# Patient Record
Sex: FEMALE | Race: BLACK OR AFRICAN AMERICAN | NOT HISPANIC OR LATINO | Employment: OTHER | URBAN - METROPOLITAN AREA
[De-identification: names, ages, dates, MRNs, and addresses within clinical notes are randomized per-mention and may not be internally consistent; named-entity substitution may affect disease eponyms.]

---

## 2017-04-27 ENCOUNTER — CONVERSION ENCOUNTER (OUTPATIENT)
Dept: GENERAL RADIOLOGY | Facility: HOSPITAL | Age: 57
End: 2017-04-27

## 2018-02-02 ENCOUNTER — CONVERSION ENCOUNTER (OUTPATIENT)
Dept: FAMILY MEDICINE CLINIC | Facility: CLINIC | Age: 58
End: 2018-02-02

## 2018-02-02 ENCOUNTER — OFFICE VISIT CONVERTED (OUTPATIENT)
Dept: FAMILY MEDICINE CLINIC | Facility: CLINIC | Age: 58
End: 2018-02-02
Attending: FAMILY MEDICINE

## 2018-02-27 ENCOUNTER — OFFICE VISIT CONVERTED (OUTPATIENT)
Dept: CARDIOLOGY | Facility: CLINIC | Age: 58
End: 2018-02-27
Attending: SPECIALIST

## 2018-03-06 ENCOUNTER — CONVERSION ENCOUNTER (OUTPATIENT)
Dept: FAMILY MEDICINE CLINIC | Facility: CLINIC | Age: 58
End: 2018-03-06

## 2018-03-06 ENCOUNTER — OFFICE VISIT CONVERTED (OUTPATIENT)
Dept: FAMILY MEDICINE CLINIC | Facility: CLINIC | Age: 58
End: 2018-03-06
Attending: FAMILY MEDICINE

## 2018-04-24 ENCOUNTER — CONVERSION ENCOUNTER (OUTPATIENT)
Dept: FAMILY MEDICINE CLINIC | Facility: CLINIC | Age: 58
End: 2018-04-24

## 2018-04-24 ENCOUNTER — OFFICE VISIT CONVERTED (OUTPATIENT)
Dept: FAMILY MEDICINE CLINIC | Facility: CLINIC | Age: 58
End: 2018-04-24
Attending: FAMILY MEDICINE

## 2018-05-01 ENCOUNTER — OFFICE VISIT CONVERTED (OUTPATIENT)
Dept: ORTHOPEDIC SURGERY | Facility: CLINIC | Age: 58
End: 2018-05-01
Attending: PHYSICIAN ASSISTANT

## 2018-05-01 ENCOUNTER — PROCEDURE VISIT CONVERTED (OUTPATIENT)
Dept: PODIATRY | Facility: CLINIC | Age: 58
End: 2018-05-01
Attending: PODIATRIST

## 2018-05-15 ENCOUNTER — CONVERSION ENCOUNTER (OUTPATIENT)
Dept: CARDIOLOGY | Facility: CLINIC | Age: 58
End: 2018-05-15
Attending: SPECIALIST

## 2018-07-26 ENCOUNTER — OFFICE VISIT CONVERTED (OUTPATIENT)
Dept: FAMILY MEDICINE CLINIC | Facility: CLINIC | Age: 58
End: 2018-07-26
Attending: FAMILY MEDICINE

## 2018-08-08 ENCOUNTER — PROCEDURE VISIT CONVERTED (OUTPATIENT)
Dept: PODIATRY | Facility: CLINIC | Age: 58
End: 2018-08-08
Attending: PODIATRIST

## 2018-08-09 ENCOUNTER — OFFICE VISIT CONVERTED (OUTPATIENT)
Dept: ORTHOPEDIC SURGERY | Facility: CLINIC | Age: 58
End: 2018-08-09
Attending: PHYSICIAN ASSISTANT

## 2018-10-01 ENCOUNTER — OFFICE VISIT CONVERTED (OUTPATIENT)
Dept: FAMILY MEDICINE CLINIC | Facility: CLINIC | Age: 58
End: 2018-10-01
Attending: NURSE PRACTITIONER

## 2018-10-02 ENCOUNTER — CONVERSION ENCOUNTER (OUTPATIENT)
Dept: GENERAL RADIOLOGY | Facility: HOSPITAL | Age: 58
End: 2018-10-02

## 2018-10-11 ENCOUNTER — OFFICE VISIT CONVERTED (OUTPATIENT)
Dept: PODIATRY | Facility: CLINIC | Age: 58
End: 2018-10-11
Attending: PODIATRIST

## 2018-10-11 ENCOUNTER — CONVERSION ENCOUNTER (OUTPATIENT)
Dept: PODIATRY | Facility: CLINIC | Age: 58
End: 2018-10-11

## 2018-10-18 ENCOUNTER — CONVERSION ENCOUNTER (OUTPATIENT)
Dept: MAMMOGRAPHY | Facility: HOSPITAL | Age: 58
End: 2018-10-18

## 2018-10-23 ENCOUNTER — CONVERSION ENCOUNTER (OUTPATIENT)
Dept: MAMMOGRAPHY | Facility: HOSPITAL | Age: 58
End: 2018-10-23

## 2018-10-31 ENCOUNTER — PROCEDURE VISIT CONVERTED (OUTPATIENT)
Dept: PODIATRY | Facility: CLINIC | Age: 58
End: 2018-10-31
Attending: PODIATRIST

## 2018-12-13 ENCOUNTER — OFFICE VISIT CONVERTED (OUTPATIENT)
Dept: ORTHOPEDIC SURGERY | Facility: CLINIC | Age: 58
End: 2018-12-13
Attending: PHYSICIAN ASSISTANT

## 2018-12-21 ENCOUNTER — OFFICE VISIT CONVERTED (OUTPATIENT)
Dept: ORTHOPEDIC SURGERY | Facility: CLINIC | Age: 58
End: 2018-12-21
Attending: PHYSICIAN ASSISTANT

## 2018-12-21 ENCOUNTER — CONVERSION ENCOUNTER (OUTPATIENT)
Dept: ORTHOPEDIC SURGERY | Facility: CLINIC | Age: 58
End: 2018-12-21

## 2019-01-08 ENCOUNTER — OFFICE VISIT CONVERTED (OUTPATIENT)
Dept: ORTHOPEDIC SURGERY | Facility: CLINIC | Age: 59
End: 2019-01-08
Attending: PHYSICIAN ASSISTANT

## 2019-02-20 ENCOUNTER — PROCEDURE VISIT CONVERTED (OUTPATIENT)
Dept: PODIATRY | Facility: CLINIC | Age: 59
End: 2019-02-20
Attending: PODIATRIST

## 2019-05-08 ENCOUNTER — HOSPITAL ENCOUNTER (OUTPATIENT)
Dept: SLEEP MEDICINE | Facility: HOSPITAL | Age: 59
Discharge: HOME | End: 2019-05-08
Attending: PSYCHIATRY & NEUROLOGY

## 2019-08-14 ENCOUNTER — HOSPITAL ENCOUNTER (OUTPATIENT)
Dept: SLEEP MEDICINE | Facility: HOSPITAL | Age: 59
Discharge: HOME | End: 2019-08-14
Attending: PSYCHIATRY & NEUROLOGY

## 2019-11-01 ENCOUNTER — CONVERSION ENCOUNTER (OUTPATIENT)
Dept: FAMILY MEDICINE CLINIC | Facility: CLINIC | Age: 59
End: 2019-11-01

## 2019-11-01 ENCOUNTER — HOSPITAL ENCOUNTER (OUTPATIENT)
Dept: FAMILY MEDICINE CLINIC | Facility: CLINIC | Age: 59
Discharge: HOME | End: 2019-11-01
Attending: FAMILY MEDICINE

## 2019-11-01 ENCOUNTER — OFFICE VISIT CONVERTED (OUTPATIENT)
Dept: FAMILY MEDICINE CLINIC | Facility: CLINIC | Age: 59
End: 2019-11-01
Attending: FAMILY MEDICINE

## 2019-11-01 LAB
AMPHETAMINES UR QL SCN: NEGATIVE
BARBITURATES UR QL SCN: NEGATIVE
BENZODIAZ UR QL SCN: NEGATIVE
CONV COCAINE, UR: NEGATIVE
METHADONE UR QL SCN: NEGATIVE
OPIATES TESTED UR SCN: NEGATIVE
OXYCODONE UR QL SCN: NEGATIVE
PCP UR QL: NEGATIVE
THC SERPLBLD CFM-MCNC: NEGATIVE NG/ML

## 2019-11-07 ENCOUNTER — HOSPITAL ENCOUNTER (OUTPATIENT)
Dept: LAB | Facility: HOSPITAL | Age: 59
Discharge: HOME | End: 2019-11-07
Attending: FAMILY MEDICINE

## 2019-11-07 LAB
25(OH)D3 SERPL-MCNC: 20.2 NG/ML (ref 30–100)
ALBUMIN SERPL-MCNC: 3.8 G/DL (ref 3.5–5)
ALBUMIN/GLOB SERPL: 0.9 {RATIO} (ref 1.4–2.6)
ALP SERPL-CCNC: 76 U/L (ref 53–141)
ALT SERPL-CCNC: 15 U/L (ref 10–40)
ANION GAP SERPL CALC-SCNC: 16 MMOL/L (ref 8–19)
APPEARANCE UR: CLEAR
AST SERPL-CCNC: 12 U/L (ref 15–50)
BASOPHILS # BLD AUTO: 0.05 10*3/UL (ref 0–0.2)
BASOPHILS NFR BLD AUTO: 0.7 % (ref 0–3)
BILIRUB SERPL-MCNC: 0.28 MG/DL (ref 0.2–1.3)
BILIRUB UR QL: NEGATIVE
BUN SERPL-MCNC: 16 MG/DL (ref 5–25)
BUN/CREAT SERPL: 18 {RATIO} (ref 6–20)
CALCIUM SERPL-MCNC: 9.5 MG/DL (ref 8.7–10.4)
CHLORIDE SERPL-SCNC: 104 MMOL/L (ref 99–111)
CHOLEST SERPL-MCNC: 170 MG/DL (ref 107–200)
CHOLEST/HDLC SERPL: 2.4 {RATIO} (ref 3–6)
COLOR UR: YELLOW
CONV ABS IMM GRAN: 0.01 10*3/UL (ref 0–0.2)
CONV CO2: 26 MMOL/L (ref 22–32)
CONV COLLECTION SOURCE (UA): NORMAL
CONV CREATININE URINE, RANDOM: 165 MG/DL (ref 10–300)
CONV IMMATURE GRAN: 0.1 % (ref 0–1.8)
CONV MICROALBUM.,U,RANDOM: <12 MG/L (ref 0–20)
CONV TOTAL PROTEIN: 7.9 G/DL (ref 6.3–8.2)
CONV UROBILINOGEN IN URINE BY AUTOMATED TEST STRIP: 0.2 {EHRLICHU}/DL (ref 0.1–1)
CREAT UR-MCNC: 0.89 MG/DL (ref 0.5–0.9)
DEPRECATED RDW RBC AUTO: 44.2 FL (ref 36.4–46.3)
EOSINOPHIL # BLD AUTO: 0.13 10*3/UL (ref 0–0.7)
EOSINOPHIL # BLD AUTO: 1.8 % (ref 0–7)
ERYTHROCYTE [DISTWIDTH] IN BLOOD BY AUTOMATED COUNT: 13.2 % (ref 11.7–14.4)
EST. AVERAGE GLUCOSE BLD GHB EST-MCNC: 128 MG/DL
FOLATE SERPL-MCNC: 6.7 NG/ML (ref 4.8–20)
GFR SERPLBLD BASED ON 1.73 SQ M-ARVRAT: >60 ML/MIN/{1.73_M2}
GLOBULIN UR ELPH-MCNC: 4.1 G/DL (ref 2–3.5)
GLUCOSE SERPL-MCNC: 91 MG/DL (ref 65–99)
GLUCOSE UR QL: NEGATIVE MG/DL
HBA1C MFR BLD: 6.1 % (ref 3.5–5.7)
HCT VFR BLD AUTO: 38.9 % (ref 37–47)
HDLC SERPL-MCNC: 71 MG/DL (ref 40–60)
HGB BLD-MCNC: 12.5 G/DL (ref 12–16)
HGB UR QL STRIP: NEGATIVE
KETONES UR QL STRIP: NEGATIVE MG/DL
LDLC SERPL CALC-MCNC: 76 MG/DL (ref 70–100)
LEUKOCYTE ESTERASE UR QL STRIP: NEGATIVE
LYMPHOCYTES # BLD AUTO: 2.51 10*3/UL (ref 1–5)
LYMPHOCYTES NFR BLD AUTO: 35.7 % (ref 20–45)
MCH RBC QN AUTO: 29.3 PG (ref 27–31)
MCHC RBC AUTO-ENTMCNC: 32.1 G/DL (ref 33–37)
MCV RBC AUTO: 91.1 FL (ref 81–99)
MICROALBUMIN/CREAT UR: 7.3 MG/G{CRE} (ref 0–35)
MONOCYTES # BLD AUTO: 0.58 10*3/UL (ref 0.2–1.2)
MONOCYTES NFR BLD AUTO: 8.3 % (ref 3–10)
NEUTROPHILS # BLD AUTO: 3.75 10*3/UL (ref 2–8)
NEUTROPHILS NFR BLD AUTO: 53.4 % (ref 30–85)
NITRITE UR QL STRIP: NEGATIVE
NRBC CBCN: 0 % (ref 0–0.7)
OSMOLALITY SERPL CALC.SUM OF ELEC: 295 MOSM/KG (ref 273–304)
PH UR STRIP.AUTO: 5 [PH] (ref 5–8)
PLATELET # BLD AUTO: 248 10*3/UL (ref 130–400)
PMV BLD AUTO: 11.1 FL (ref 9.4–12.3)
POTASSIUM SERPL-SCNC: 4.1 MMOL/L (ref 3.5–5.3)
PROT UR QL: NEGATIVE MG/DL
RBC # BLD AUTO: 4.27 10*6/UL (ref 4.2–5.4)
SODIUM SERPL-SCNC: 142 MMOL/L (ref 135–147)
SP GR UR: 1.02 (ref 1–1.03)
TRIGL SERPL-MCNC: 113 MG/DL (ref 40–150)
TSH SERPL-ACNC: 1.87 M[IU]/L (ref 0.27–4.2)
VIT B12 SERPL-MCNC: 423 PG/ML (ref 211–911)
VLDLC SERPL-MCNC: 23 MG/DL (ref 5–37)
WBC # BLD AUTO: 7.03 10*3/UL (ref 4.8–10.8)

## 2019-11-11 ENCOUNTER — HOSPITAL ENCOUNTER (OUTPATIENT)
Dept: MAMMOGRAPHY | Facility: HOSPITAL | Age: 59
Discharge: HOME | End: 2019-11-11
Attending: FAMILY MEDICINE

## 2019-11-20 ENCOUNTER — HOSPITAL ENCOUNTER (OUTPATIENT)
Dept: MRI IMAGING | Facility: HOSPITAL | Age: 59
Discharge: HOME | End: 2019-11-20
Attending: FAMILY MEDICINE

## 2019-12-04 ENCOUNTER — OFFICE VISIT CONVERTED (OUTPATIENT)
Dept: SURGERY | Facility: CLINIC | Age: 59
End: 2019-12-04
Attending: SURGERY

## 2019-12-13 ENCOUNTER — OFFICE VISIT CONVERTED (OUTPATIENT)
Dept: FAMILY MEDICINE CLINIC | Facility: CLINIC | Age: 59
End: 2019-12-13
Attending: FAMILY MEDICINE

## 2019-12-13 ENCOUNTER — CONVERSION ENCOUNTER (OUTPATIENT)
Dept: FAMILY MEDICINE CLINIC | Facility: CLINIC | Age: 59
End: 2019-12-13

## 2019-12-17 ENCOUNTER — CONVERSION ENCOUNTER (OUTPATIENT)
Dept: ORTHOPEDIC SURGERY | Facility: CLINIC | Age: 59
End: 2019-12-17

## 2019-12-17 ENCOUNTER — OFFICE VISIT CONVERTED (OUTPATIENT)
Dept: ORTHOPEDIC SURGERY | Facility: CLINIC | Age: 59
End: 2019-12-17
Attending: PHYSICIAN ASSISTANT

## 2020-01-10 ENCOUNTER — HOSPITAL ENCOUNTER (OUTPATIENT)
Dept: GASTROENTEROLOGY | Facility: HOSPITAL | Age: 60
Setting detail: HOSPITAL OUTPATIENT SURGERY
Discharge: HOME | End: 2020-01-10
Attending: SURGERY

## 2020-01-16 ENCOUNTER — OFFICE VISIT CONVERTED (OUTPATIENT)
Dept: ORTHOPEDIC SURGERY | Facility: CLINIC | Age: 60
End: 2020-01-16
Attending: ORTHOPAEDIC SURGERY

## 2020-01-22 ENCOUNTER — HOSPITAL ENCOUNTER (OUTPATIENT)
Dept: PERIOP | Facility: HOSPITAL | Age: 60
Setting detail: HOSPITAL OUTPATIENT SURGERY
Discharge: HOME | End: 2020-01-22
Attending: ORTHOPAEDIC SURGERY

## 2020-01-27 ENCOUNTER — HOSPITAL ENCOUNTER (OUTPATIENT)
Dept: PHYSICAL THERAPY | Facility: CLINIC | Age: 60
Setting detail: RECURRING SERIES
Discharge: HOME | End: 2020-04-30
Attending: ORTHOPAEDIC SURGERY

## 2020-02-05 ENCOUNTER — CONVERSION ENCOUNTER (OUTPATIENT)
Dept: SURGERY | Facility: CLINIC | Age: 60
End: 2020-02-05

## 2020-02-05 ENCOUNTER — OFFICE VISIT CONVERTED (OUTPATIENT)
Dept: SURGERY | Facility: CLINIC | Age: 60
End: 2020-02-05
Attending: NURSE PRACTITIONER

## 2020-02-06 ENCOUNTER — OFFICE VISIT CONVERTED (OUTPATIENT)
Dept: ORTHOPEDIC SURGERY | Facility: CLINIC | Age: 60
End: 2020-02-06
Attending: PHYSICIAN ASSISTANT

## 2020-02-17 ENCOUNTER — OFFICE VISIT CONVERTED (OUTPATIENT)
Dept: PODIATRY | Facility: CLINIC | Age: 60
End: 2020-02-17
Attending: PODIATRIST

## 2020-02-17 ENCOUNTER — CONVERSION ENCOUNTER (OUTPATIENT)
Dept: PODIATRY | Facility: CLINIC | Age: 60
End: 2020-02-17

## 2020-03-05 ENCOUNTER — CONVERSION ENCOUNTER (OUTPATIENT)
Dept: ORTHOPEDIC SURGERY | Facility: CLINIC | Age: 60
End: 2020-03-05

## 2020-03-05 ENCOUNTER — OFFICE VISIT CONVERTED (OUTPATIENT)
Dept: ORTHOPEDIC SURGERY | Facility: CLINIC | Age: 60
End: 2020-03-05
Attending: PHYSICIAN ASSISTANT

## 2020-03-11 ENCOUNTER — HOSPITAL ENCOUNTER (OUTPATIENT)
Dept: LAB | Facility: HOSPITAL | Age: 60
Discharge: HOME | End: 2020-03-11
Attending: FAMILY MEDICINE

## 2020-03-11 LAB
25(OH)D3 SERPL-MCNC: 18.5 NG/ML (ref 30–100)
ALBUMIN SERPL-MCNC: 3.9 G/DL (ref 3.5–5)
ALBUMIN/GLOB SERPL: 1 {RATIO} (ref 1.4–2.6)
ALP SERPL-CCNC: 57 U/L (ref 43–160)
ALT SERPL-CCNC: 12 U/L (ref 10–40)
ANION GAP SERPL CALC-SCNC: 19 MMOL/L (ref 8–19)
APPEARANCE UR: ABNORMAL
AST SERPL-CCNC: 12 U/L (ref 15–50)
BASOPHILS # BLD AUTO: 0.04 10*3/UL (ref 0–0.2)
BASOPHILS NFR BLD AUTO: 0.6 % (ref 0–3)
BILIRUB SERPL-MCNC: 0.19 MG/DL (ref 0.2–1.3)
BILIRUB UR QL: NEGATIVE
BUN SERPL-MCNC: 11 MG/DL (ref 5–25)
BUN/CREAT SERPL: 12 {RATIO} (ref 6–20)
CALCIUM SERPL-MCNC: 9.4 MG/DL (ref 8.7–10.4)
CHLORIDE SERPL-SCNC: 102 MMOL/L (ref 99–111)
CHOLEST SERPL-MCNC: 162 MG/DL (ref 107–200)
CHOLEST/HDLC SERPL: 2.1 {RATIO} (ref 3–6)
COLOR UR: YELLOW
CONV ABS IMM GRAN: 0.02 10*3/UL (ref 0–0.2)
CONV BACTERIA: ABNORMAL
CONV CO2: 25 MMOL/L (ref 22–32)
CONV COLLECTION SOURCE (UA): ABNORMAL
CONV CREATININE URINE, RANDOM: 306.4 MG/DL (ref 10–300)
CONV IMMATURE GRAN: 0.3 % (ref 0–1.8)
CONV MICROALBUM.,U,RANDOM: 26.3 MG/L (ref 0–20)
CONV TOTAL PROTEIN: 7.7 G/DL (ref 6.3–8.2)
CONV UROBILINOGEN IN URINE BY AUTOMATED TEST STRIP: 1 {EHRLICHU}/DL (ref 0.1–1)
CREAT UR-MCNC: 0.89 MG/DL (ref 0.5–0.9)
DEPRECATED RDW RBC AUTO: 45.9 FL (ref 36.4–46.3)
EOSINOPHIL # BLD AUTO: 0.11 10*3/UL (ref 0–0.7)
EOSINOPHIL # BLD AUTO: 1.6 % (ref 0–7)
ERYTHROCYTE [DISTWIDTH] IN BLOOD BY AUTOMATED COUNT: 13.4 % (ref 11.7–14.4)
EST. AVERAGE GLUCOSE BLD GHB EST-MCNC: 114 MG/DL
FOLATE SERPL-MCNC: 9.2 NG/ML (ref 4.8–20)
GFR SERPLBLD BASED ON 1.73 SQ M-ARVRAT: >60 ML/MIN/{1.73_M2}
GLOBULIN UR ELPH-MCNC: 3.8 G/DL (ref 2–3.5)
GLUCOSE SERPL-MCNC: 83 MG/DL (ref 65–99)
GLUCOSE UR QL: NEGATIVE MG/DL
HBA1C MFR BLD: 5.6 % (ref 3.5–5.7)
HCT VFR BLD AUTO: 40.2 % (ref 37–47)
HDLC SERPL-MCNC: 79 MG/DL (ref 40–60)
HGB BLD-MCNC: 12.4 G/DL (ref 12–16)
HGB UR QL STRIP: ABNORMAL
KETONES UR QL STRIP: NEGATIVE MG/DL
LDLC SERPL CALC-MCNC: 61 MG/DL (ref 70–100)
LEUKOCYTE ESTERASE UR QL STRIP: ABNORMAL
LYMPHOCYTES # BLD AUTO: 3.07 10*3/UL (ref 1–5)
LYMPHOCYTES NFR BLD AUTO: 44.9 % (ref 20–45)
MCH RBC QN AUTO: 28.6 PG (ref 27–31)
MCHC RBC AUTO-ENTMCNC: 30.8 G/DL (ref 33–37)
MCV RBC AUTO: 92.8 FL (ref 81–99)
MICROALBUMIN/CREAT UR: 8.6 MG/G{CRE} (ref 0–35)
MONOCYTES # BLD AUTO: 0.6 10*3/UL (ref 0.2–1.2)
MONOCYTES NFR BLD AUTO: 8.8 % (ref 3–10)
NEUTROPHILS # BLD AUTO: 2.99 10*3/UL (ref 2–8)
NEUTROPHILS NFR BLD AUTO: 43.8 % (ref 30–85)
NITRITE UR QL STRIP: NEGATIVE
NRBC CBCN: 0 % (ref 0–0.7)
OSMOLALITY SERPL CALC.SUM OF ELEC: 293 MOSM/KG (ref 273–304)
PH UR STRIP.AUTO: 5 [PH] (ref 5–8)
PLATELET # BLD AUTO: 278 10*3/UL (ref 130–400)
PMV BLD AUTO: 11 FL (ref 9.4–12.3)
POTASSIUM SERPL-SCNC: 4.1 MMOL/L (ref 3.5–5.3)
PROT UR QL: ABNORMAL MG/DL
RBC # BLD AUTO: 4.33 10*6/UL (ref 4.2–5.4)
RBC #/AREA URNS HPF: ABNORMAL /[HPF]
SODIUM SERPL-SCNC: 142 MMOL/L (ref 135–147)
SP GR UR: 1.03 (ref 1–1.03)
SQUAMOUS SPT QL MICRO: ABNORMAL /[HPF]
TRIGL SERPL-MCNC: 110 MG/DL (ref 40–150)
TSH SERPL-ACNC: 2.07 M[IU]/L (ref 0.27–4.2)
VIT B12 SERPL-MCNC: 405 PG/ML (ref 211–911)
VLDLC SERPL-MCNC: 22 MG/DL (ref 5–37)
WBC # BLD AUTO: 6.83 10*3/UL (ref 4.8–10.8)
WBC #/AREA URNS HPF: ABNORMAL /[HPF]

## 2020-03-13 ENCOUNTER — CONVERSION ENCOUNTER (OUTPATIENT)
Dept: FAMILY MEDICINE CLINIC | Facility: CLINIC | Age: 60
End: 2020-03-13

## 2020-03-13 ENCOUNTER — HOSPITAL ENCOUNTER (OUTPATIENT)
Dept: FAMILY MEDICINE CLINIC | Facility: CLINIC | Age: 60
Discharge: HOME | End: 2020-03-13
Attending: FAMILY MEDICINE

## 2020-03-13 ENCOUNTER — OFFICE VISIT CONVERTED (OUTPATIENT)
Dept: FAMILY MEDICINE CLINIC | Facility: CLINIC | Age: 60
End: 2020-03-13
Attending: FAMILY MEDICINE

## 2020-03-15 LAB — BACTERIA UR CULT: NORMAL

## 2020-04-16 ENCOUNTER — CONVERSION ENCOUNTER (OUTPATIENT)
Dept: ORTHOPEDIC SURGERY | Facility: CLINIC | Age: 60
End: 2020-04-16

## 2020-04-16 ENCOUNTER — TELEMEDICINE CONVERTED (OUTPATIENT)
Dept: ORTHOPEDIC SURGERY | Facility: CLINIC | Age: 60
End: 2020-04-16
Attending: PHYSICIAN ASSISTANT

## 2020-05-28 ENCOUNTER — OFFICE VISIT CONVERTED (OUTPATIENT)
Dept: ORTHOPEDIC SURGERY | Facility: CLINIC | Age: 60
End: 2020-05-28
Attending: PHYSICIAN ASSISTANT

## 2020-06-25 ENCOUNTER — CONVERSION ENCOUNTER (OUTPATIENT)
Dept: PODIATRY | Facility: CLINIC | Age: 60
End: 2020-06-25

## 2020-06-25 ENCOUNTER — PROCEDURE VISIT CONVERTED (OUTPATIENT)
Dept: PODIATRY | Facility: CLINIC | Age: 60
End: 2020-06-25
Attending: PODIATRIST

## 2020-07-20 ENCOUNTER — OFFICE VISIT CONVERTED (OUTPATIENT)
Dept: FAMILY MEDICINE CLINIC | Facility: CLINIC | Age: 60
End: 2020-07-20
Attending: FAMILY MEDICINE

## 2020-07-29 ENCOUNTER — HOSPITAL ENCOUNTER (OUTPATIENT)
Dept: SLEEP MEDICINE | Facility: HOSPITAL | Age: 60
Discharge: HOME | End: 2020-07-29
Attending: PSYCHIATRY & NEUROLOGY

## 2020-07-30 ENCOUNTER — OFFICE VISIT CONVERTED (OUTPATIENT)
Dept: ORTHOPEDIC SURGERY | Facility: CLINIC | Age: 60
End: 2020-07-30
Attending: PHYSICIAN ASSISTANT

## 2020-08-12 ENCOUNTER — HOSPITAL ENCOUNTER (OUTPATIENT)
Dept: MAMMOGRAPHY | Facility: HOSPITAL | Age: 60
Discharge: HOME | End: 2020-08-12
Attending: FAMILY MEDICINE

## 2020-09-01 ENCOUNTER — OFFICE VISIT CONVERTED (OUTPATIENT)
Dept: ORTHOPEDIC SURGERY | Facility: CLINIC | Age: 60
End: 2020-09-01
Attending: PHYSICIAN ASSISTANT

## 2020-09-28 ENCOUNTER — PROCEDURE VISIT CONVERTED (OUTPATIENT)
Dept: PODIATRY | Facility: CLINIC | Age: 60
End: 2020-09-28
Attending: PODIATRIST

## 2020-11-13 ENCOUNTER — HOSPITAL ENCOUNTER (OUTPATIENT)
Dept: URGENT CARE | Facility: CLINIC | Age: 60
Discharge: HOME | End: 2020-11-13
Attending: NURSE PRACTITIONER

## 2020-12-14 ENCOUNTER — PROCEDURE VISIT CONVERTED (OUTPATIENT)
Dept: PODIATRY | Facility: CLINIC | Age: 60
End: 2020-12-14
Attending: PODIATRIST

## 2021-02-22 ENCOUNTER — PROCEDURE VISIT CONVERTED (OUTPATIENT)
Dept: PODIATRY | Facility: CLINIC | Age: 61
End: 2021-02-22
Attending: PODIATRIST

## 2021-02-25 ENCOUNTER — OFFICE VISIT CONVERTED (OUTPATIENT)
Dept: FAMILY MEDICINE CLINIC | Facility: CLINIC | Age: 61
End: 2021-02-25
Attending: FAMILY MEDICINE

## 2021-03-02 ENCOUNTER — CONVERSION ENCOUNTER (OUTPATIENT)
Dept: ORTHOPEDIC SURGERY | Facility: CLINIC | Age: 61
End: 2021-03-02

## 2021-03-02 ENCOUNTER — OFFICE VISIT CONVERTED (OUTPATIENT)
Dept: ORTHOPEDIC SURGERY | Facility: CLINIC | Age: 61
End: 2021-03-02
Attending: ORTHOPAEDIC SURGERY

## 2021-03-10 ENCOUNTER — HOSPITAL ENCOUNTER (OUTPATIENT)
Dept: LAB | Facility: HOSPITAL | Age: 61
Discharge: HOME | End: 2021-03-10
Attending: FAMILY MEDICINE

## 2021-03-10 LAB
25(OH)D3 SERPL-MCNC: 24.1 NG/ML (ref 30–100)
ALBUMIN SERPL-MCNC: 3.7 G/DL (ref 3.5–5)
ALBUMIN/GLOB SERPL: 0.9 {RATIO} (ref 1.4–2.6)
ALP SERPL-CCNC: 61 U/L (ref 43–160)
ALT SERPL-CCNC: 12 U/L (ref 10–40)
ANION GAP SERPL CALC-SCNC: 17 MMOL/L (ref 8–19)
APPEARANCE UR: CLEAR
AST SERPL-CCNC: 14 U/L (ref 15–50)
BASOPHILS # BLD AUTO: 0.04 10*3/UL (ref 0–0.2)
BASOPHILS NFR BLD AUTO: 0.7 % (ref 0–3)
BILIRUB SERPL-MCNC: 0.17 MG/DL (ref 0.2–1.3)
BILIRUB UR QL: NEGATIVE
BUN SERPL-MCNC: 19 MG/DL (ref 5–25)
BUN/CREAT SERPL: 15 {RATIO} (ref 6–20)
CALCIUM SERPL-MCNC: 9.5 MG/DL (ref 8.7–10.4)
CHLORIDE SERPL-SCNC: 106 MMOL/L (ref 99–111)
CHOLEST SERPL-MCNC: 178 MG/DL (ref 107–200)
CHOLEST/HDLC SERPL: 2 {RATIO} (ref 3–6)
COLOR UR: YELLOW
CONV ABS IMM GRAN: 0.01 10*3/UL (ref 0–0.2)
CONV CO2: 25 MMOL/L (ref 22–32)
CONV COLLECTION SOURCE (UA): NORMAL
CONV CREATININE URINE, RANDOM: 159.7 MG/DL (ref 10–300)
CONV IMMATURE GRAN: 0.2 % (ref 0–1.8)
CONV MICROALBUM.,U,RANDOM: <12 MG/L (ref 0–20)
CONV TOTAL PROTEIN: 7.6 G/DL (ref 6.3–8.2)
CONV UROBILINOGEN IN URINE BY AUTOMATED TEST STRIP: 0.2 {EHRLICHU}/DL (ref 0.1–1)
CREAT UR-MCNC: 1.24 MG/DL (ref 0.5–0.9)
DEPRECATED RDW RBC AUTO: 44.3 FL (ref 36.4–46.3)
EOSINOPHIL # BLD AUTO: 0.12 10*3/UL (ref 0–0.7)
EOSINOPHIL # BLD AUTO: 2 % (ref 0–7)
ERYTHROCYTE [DISTWIDTH] IN BLOOD BY AUTOMATED COUNT: 13 % (ref 11.7–14.4)
EST. AVERAGE GLUCOSE BLD GHB EST-MCNC: 123 MG/DL
FOLATE SERPL-MCNC: 5.6 NG/ML (ref 4.8–20)
GFR SERPLBLD BASED ON 1.73 SQ M-ARVRAT: 54 ML/MIN/{1.73_M2}
GLOBULIN UR ELPH-MCNC: 3.9 G/DL (ref 2–3.5)
GLUCOSE SERPL-MCNC: 80 MG/DL (ref 65–99)
GLUCOSE UR QL: NEGATIVE MG/DL
HBA1C MFR BLD: 5.9 % (ref 3.5–5.7)
HCT VFR BLD AUTO: 41.1 % (ref 37–47)
HDLC SERPL-MCNC: 87 MG/DL (ref 40–60)
HGB BLD-MCNC: 12.7 G/DL (ref 12–16)
HGB UR QL STRIP: NEGATIVE
KETONES UR QL STRIP: NEGATIVE MG/DL
LDLC SERPL CALC-MCNC: 74 MG/DL (ref 70–100)
LEUKOCYTE ESTERASE UR QL STRIP: NEGATIVE
LYMPHOCYTES # BLD AUTO: 2.67 10*3/UL (ref 1–5)
LYMPHOCYTES NFR BLD AUTO: 45.6 % (ref 20–45)
MCH RBC QN AUTO: 28.4 PG (ref 27–31)
MCHC RBC AUTO-ENTMCNC: 30.9 G/DL (ref 33–37)
MCV RBC AUTO: 91.9 FL (ref 81–99)
MICROALBUMIN/CREAT UR: 7.5 MG/G{CRE} (ref 0–35)
MONOCYTES # BLD AUTO: 0.6 10*3/UL (ref 0.2–1.2)
MONOCYTES NFR BLD AUTO: 10.2 % (ref 3–10)
NEUTROPHILS # BLD AUTO: 2.42 10*3/UL (ref 2–8)
NEUTROPHILS NFR BLD AUTO: 41.3 % (ref 30–85)
NITRITE UR QL STRIP: NEGATIVE
NRBC CBCN: 0 % (ref 0–0.7)
OSMOLALITY SERPL CALC.SUM OF ELEC: 297 MOSM/KG (ref 273–304)
PH UR STRIP.AUTO: 5.5 [PH] (ref 5–8)
PLATELET # BLD AUTO: 245 10*3/UL (ref 130–400)
PMV BLD AUTO: 11.6 FL (ref 9.4–12.3)
POTASSIUM SERPL-SCNC: 4.6 MMOL/L (ref 3.5–5.3)
PROT UR QL: NEGATIVE MG/DL
RBC # BLD AUTO: 4.47 10*6/UL (ref 4.2–5.4)
SODIUM SERPL-SCNC: 143 MMOL/L (ref 135–147)
SP GR UR: 1.03 (ref 1–1.03)
TRIGL SERPL-MCNC: 86 MG/DL (ref 40–150)
TSH SERPL-ACNC: 2.38 M[IU]/L (ref 0.27–4.2)
VIT B12 SERPL-MCNC: 386 PG/ML (ref 211–911)
VLDLC SERPL-MCNC: 17 MG/DL (ref 5–37)
WBC # BLD AUTO: 5.86 10*3/UL (ref 4.8–10.8)

## 2021-03-19 ENCOUNTER — HOSPITAL ENCOUNTER (OUTPATIENT)
Dept: URGENT CARE | Facility: CLINIC | Age: 61
Discharge: HOME | End: 2021-03-19
Attending: EMERGENCY MEDICINE

## 2021-04-03 ENCOUNTER — HOSPITAL ENCOUNTER (OUTPATIENT)
Dept: MAMMOGRAPHY | Facility: HOSPITAL | Age: 61
Discharge: HOME | End: 2021-04-03
Attending: FAMILY MEDICINE

## 2021-05-12 NOTE — PROGRESS NOTES
Progress Note      Patient Name: Anita Estrada   Patient ID: [id number]   Sex: Female   YOB: 1960    Primary Care Provider: Valentino Rizo MD   Referring Provider: Valentino Rizo MD    Visit Date: April 16, 2020    Provider: Mary Cast PA-C   Location: [facility]   Location Address: [address] [place] [address]  [id number]   Location Phone: [phone]          Chief Complaint  · Follow up left shoulder arthroscopy      History Of Present Illness  Video Conferencing Visit  Anita Estrada is a 59 year old /Black female who is presenting for evaluation via video conferencing. Verbal consent obtained before beginning visit.   The following staff were present during this visit: Mary Cast PA-C      She is s/p left arthroscopic SAD/DC and mini open RCR 1/22/20 by Dr. Todd. She is doing well. She is making progress in PT. She did miss 2 weeks due to COVID. She states pain at night and shakiness.             Past Medical History  Arthritis; Asthma; Carpal tunnel syndrome; Eczema; Essential hypertension; Foot pain, left; Foot pain, right; Hypertension; Ingrowing nail; Limb Swelling; Lumbar back pain; Migraine Headaches; Obesity; Plantar fascial fibromatosis of right foot; Pressure ulcer, stage 1; Reflux; Sleep apnea with use of continuous positive airway pressure (CPAP); Tinea unguium         Past Surgical History  carpal tunnel; Dilation and curretage; Eye Implant; Hysterectomy; Lumpectomy, right breast         Medication List  Asmanex Twisthaler 220 mcg/ actuation (120) inhalation aerosol powdr breath activated; gabapentin 800 mg oral tablet; hydrochlorothiazide 25 mg oral tablet; ibuprofen 800 mg oral tablet; losartan 50 mg oral tablet; Norco 7.5-325 mg oral tablet; Ozempic 0.25 mg or 0.5 mg(2 mg/1.5 mL) subcutaneous pen injector; pantoprazole 40 mg oral tablet,delayed release (DR/EC); tylenol oral; Ventolin HFA 90 mcg/actuation inhalation HFA aerosol inhaler; Vitamin D2  "1,250 mcg (50,000 unit) oral capsule; Voltaren 1 % topical gel         Allergy List  NO KNOWN DRUG ALLERGIES       Allergies Reconciled  Family Medical History  Stroke; Cancer, Unspecified; Diabetes, unspecified type; Family history of certain chronic disabling diseases; arthritis; Osteoporosis; Family history of Arthritis         Social History  Alcohol (Current some day); Alcohol Use (Current some day); Claustophobic (Unknown); lives with children; lives with parents; .; Recreational Drug Use (Never); Retired.; Tobacco (Former); Unemployed.         Review of Systems  · Constitutional  o Denies  o : fever, chills, weight loss  · Cardiovascular  o Denies  o : chest pain, shortness of breath  · Gastrointestinal  o Denies  o : liver disease, heartburn, nausea, blood in stools  · Genitourinary  o Denies  o : painful urination, blood in urine  · Integument  o Denies  o : rash, itching  · Neurologic  o Denies  o : headache, weakness, loss of consciousness  · Musculoskeletal  o Admits  o : painful, swollen joints  · Psychiatric  o Denies  o : drug/alcohol addiction, anxiety, depression      Vitals  Date Time BP Position Site L\R Cuff Size HR RR TEMP (F) WT  HT  BMI kg/m2 BSA m2 O2 Sat        04/16/2020 01:32 PM         330lbs 16oz 5'  4\" 56.82 2.6           Physical Examination  · Constitutional  o Appearance  o : well developed, well-nourished, no obvious deformities present  · Head and Face  o Head  o :   § Inspection  § : normocephalic  o Face  o :   § Inspection  § : no facial lesions  · Eyes  o Conjunctivae  o : conjunctivae normal  o Sclerae  o : sclerae white  · Ears, Nose, Mouth and Throat  o Ears  o :   § External Ears  § : appearance within normal limits  § Hearing  § : intact  o Nose  o :   § External Nose  § : appearance normal  · Neck  o Inspection/Palpation  o : normal appearance  o Range of Motion  o : full range of motion  · Respiratory  o Respiratory Effort  o : breathing unlabored  o Inspection " of Chest  o : normal appearance  o Auscultation of Lungs  o : no audible wheezing or rales  · Skin and Subcutaneous Tissue  o General Inspection  o : intact, no rashes  · Psychiatric  o General  o : Alert and oriented x3  o Judgement and Insight  o : judgment and insight intact  o Mood and Affect  o : mood normal, affect appropriate  · Left Shoulder  o Inspection  o : Normal appearing. Forward flexion 150 degrees. Abduction 150 degrees. ER 45 degrees. IR to SI joint.           Assessment  · Left Aftercare following surgery of the muskuloskeletal system     V54.81      Plan  · Medications  o Medications have been Reconciled  o Transition of Care or Provider Policy  · Instructions  o Reviewed the patient's Past Medical, Social, and Family history as well as the ROS at today's visit, no changes.  o Call or return if worsening symptoms.  o Wean off hydrocodone -> OTC meds. Continue PT. Follow up 6 weeks.   o Electronically Identified Patient Education Materials Provided Electronically            Electronically Signed by: Mary Cast PA-C -Author on April 16, 2020 01:41:20 PM

## 2021-05-13 NOTE — PROGRESS NOTES
Progress Note      Patient Name: Anita Estrada   Patient ID: [id number]   Sex: Female   YOB: 1960    Primary Care Provider: Valentino Rizo MD   Referring Provider: Valentino Rizo MD    Visit Date: December 14, 2020    Provider: Ranulfo Huizar DPM   Location: [facility]   Location Address: [address] [place] [address]  [id number]   Location Phone: [phone]          Chief Complaint  · Routine Foot Care Visit      History Of Present Illness  Anita Estrada complains of painful, elongated toenails which are thickened, yellowed, chalky, and cause pain with shoe gear and ambulation.      New, Established, New Problem:  Established   Location:  Toenails  Duration:   Greater than five years  Onset:  Gradual  Nature:  Sore with palpation.  Stable, worsening, improving:   stable  Aggravating factors:  Pain with shoe gear and ambulation.  Previous Treatment:  Debridement    Patient denies any fevers, chills, nausea, vomiting, nor any other constitutional signs nor symptoms.    Patient relates no medical changes since their last visit.       Past Medical History  Arthritis; Asthma; Carpal tunnel syndrome; Eczema; Essential hypertension; Foot pain, left; Foot pain, right; Hypertension; Ingrowing nail; Limb Swelling; Lumbar back pain; Migraine Headaches; Obesity; Plantar fascial fibromatosis of right foot; Pressure ulcer, stage 1; Reflux; Sleep apnea with use of continuous positive airway pressure (CPAP); Tinea unguium         Past Surgical History  carpal tunnel; Dilation and curretage; Eye Implant; Hysterectomy; Lumpectomy, right breast         Medication List  Asmanex Twisthaler 220 mcg/ actuation (120) inhalation aerosol powdr breath activated; gabapentin 800 mg oral tablet; hydrochlorothiazide 25 mg oral tablet; ibuprofen 800 mg oral tablet; losartan 50 mg oral tablet; Ozempic 0.25 mg or 0.5 mg(2 mg/1.5 mL) subcutaneous pen injector; OZEMPIC 0.25 OR 0.5 MG/DOSE 2 Solution  "Pen-injector; tylenol oral; Ventolin HFA 90 mcg/actuation inhalation HFA aerosol inhaler; VIT D2 1.25 MG (50,000 UNIT 1.25 MG Capsule; Voltaren 1 % topical gel         Allergy List  NO KNOWN DRUG ALLERGIES       Allergies Reconciled  Family Medical History  Stroke; Cancer, Unspecified; Diabetes, unspecified type; Family history of certain chronic disabling diseases; arthritis; Osteoporosis; Family history of Arthritis         Social History  Alcohol (Current some day); Alcohol Use (Current some day); Claustophobic (Unknown); lives with children; lives with parents; .; Recreational Drug Use (Never); Retired.; Tobacco (Former); Unemployed.         Immunizations  Name Date Admin   Influenza 11/01/2019   Influenza 11/13/2018   Influenza 09/29/2017         Review of Systems  · Constitutional  o Denies  o : fever, chills  · Eyes  o Denies  o : double vision  · HENT  o Denies  o : vertigo, recent head injury, hearing loss or ringing  · Cardiovascular  o Denies  o : chest pain, irregular heart beats  · Respiratory  o Denies  o : shortness of breath, productive cough  · Gastrointestinal  o Denies  o : nausea, vomiting  · Integument  o Denies  o : hair growth change, new skin lesions  · Neurologic  o Denies  o : altered mental status, tingling or numbness, seizures  · Musculoskeletal  o Denies  o : joint pain, joint swelling, limitation of motion      Vitals  Date Time BP Position Site L\R Cuff Size HR RR TEMP (F) WT  HT  BMI kg/m2 BSA m2 O2 Sat FR L/min FiO2        12/14/2020 03:35 /60 Sitting    71 - R  97.5 323lbs 0oz 5'  4\" 55.44 2.57 98 %            Physical Examination  · Constitutional  o Appearance  o : obese, well developed, no obvious deformities present  · Eyes  o Vision  o : Patient wearing glasses.   · Respiratory  o Respiratory Effort  o : No labored breathing. Good respiratory effort.   · Cardiovascular  o Peripheral Vascular System  o :   § Pedal Pulses  § : Pedal Pulses are 2+ and " symmetrical  § Extremities  § : There is no edema of the lower extremities  · Musculoskeletal  o Extremeties/Joint  o : Lower extremity muscle strength and range of motion is equal and symmetrical bilaterally. The knees are noted to be in normal alignment. Ankle alignment and range of motion is normal and foot structure is normal. No signs of edema, erythema, lymphangitis, nor signs of infection.   · Skin and Subcutaneous Tissue  o General Inspection  o : no lesions present, no areas of discoloration, skin turgor normal, texture normal  · Neurologic  o Sensation  o : Sharp/dull sensation is within normal limits. [place] 5.07 monofilament intact to all assessed areas.   · Toes  o Toes: Right Foot  o :   § Toenails  § : Toenails are hypertrophic, mycotic, dystrophic, brittle toenail(s) at nail 1, 2, 3, 4 with onycholysis of the right foot. The 1st, 2nd, 3rd, 4th toenail(s) on the right have 2 mm in thickness with subungual detritus. There is an incurvated toenail at the distal border of the 1st, 2nd, 3rd, 4th, 5th toe  o Toes: Left Foot  o :   § Toenails  § : There are hypertrophic, mycotic, dystrophic, brittle toenail(s) at the 1, 2, 3, 4, 5 with onycholysis of the left foot. The 1st, 2nd, 3rd, 4th, 5th toenail(s) on the left have 2 mm in thickness with subungual detritus. There is an incurvated toenail at the distal border of the 1st, 2nd, 3rd, 4th, 5th toe  · Procedures  o Nail Debridement  o : Nail debridement is indicated for the following toenails:, left hallux, left 2nd toe, left 3rd toe, left 4th toe, left 5th toe, right hallux, right 2nd toe, right 3rd toe, right 4th toe. The nail was debrided of excessive thickness to appropriate levels of comfort and contour using, nail nippers. The procedure was without complications          Assessment  · Foot pain, left     729.5/M79.672  · Foot pain, right     729.5/M79.671  · Ingrowing nail     703.0/L60.0  · Tinea  unguium     110.1/B35.1  · Obesity     278.00/E66.9      Plan  · Orders  o Debridement of six or more nails (42220, 62774, 74272, 70948, 43023) - 729.5/M79.672, 729.5/M79.671, 703.0/L60.0, 110.1/B35.1 - 12/14/2020  o ACO-16: BMI above normal range and follow-up plan is documented (, , , , ) - - 12/14/2020  o ACO-17: Screened for tobacco use AND received tobacco cessation intervention (4004F, 4004F, 4004F, 4004F, 4004F) - - 12/14/2020  · Medications  o Medications have been Reconciled  o Transition of Care or Provider Policy  · Instructions  o I have discussed the findings of this evaluation with the patient. The discussion included a complete verbal explanation of any changes in the examination results, diagnosis, and the current treatment plan. A schedule for future care needs was explained. If any questions should arise after returning home, I have encouraged the patient to feel free to contact Dr. Huizar. The patient states understanding and agreement with this plan.   o Patient is to monitor for problems and to contact Dr. Huizar for follow-up should such signs occur. Patient states understanding and agreement with this plan.   o Encouraged to follow-up with Primary Care Provider for preventative care.   o BMI Counseling: Discussed weight loss and the need for exercise.   o Tobacco Cessation Counseling: Encouraged to stop smoking.   o RTC 9 weeks for nail care.  o Patient to monitor for recurrence of symptoms and to contact Dr. Rivas office for a follow-up appointment.  o Electronically Identified Patient Education Materials Provided Electronically  · Disposition  o Call or Return if symptoms worsen or persist.            Electronically Signed by: Ranulfo Huizar DPM -Author on December 14, 2020 03:42:34 PM

## 2021-05-13 NOTE — PROGRESS NOTES
Progress Note      Patient Name: Anita Estrada   Patient ID: [id number]   Sex: Female   YOB: 1960    Primary Care Provider: Valentino Rizo MD   Referring Provider: Valentino Rizo MD    Visit Date: June 25, 2020    Provider: Ranulfo Huizar DPM   Location: [facility]   Location Address: [address] [place] [address]  [id number]   Location Phone: [phone]          Chief Complaint  · Routine Foot Care Visit      History Of Present Illness  Anita Estrada complains of painful, elongated toenails which are thickened, yellowed, chalky, and cause pain with shoe gear and ambulation.      New, Established, New Problem:  Established   Location:  Toenails  Duration:   Greater than five years  Onset:  Gradual  Nature:  Sore with palpation.  Stable, worsening, improving:   Worsening  Aggravating factors:  Pain with shoe gear and ambulation.  Previous Treatment:  Debridement    Patient denies any fevers, chills, nausea, vomiting, nor any other constitutional signs nor symptoms.    Patient relates no medical changes since their last visit.    Patient relates no medical changes since their last visit.           Past Medical History  Arthritis; Asthma; Carpal tunnel syndrome; Eczema; Essential hypertension; Foot pain, left; Foot pain, right; Hypertension; Ingrowing nail; Limb Swelling; Lumbar back pain; Migraine Headaches; Obesity; Plantar fascial fibromatosis of right foot; Pressure ulcer, stage 1; Reflux; Sleep apnea with use of continuous positive airway pressure (CPAP); Tinea unguium         Past Surgical History  carpal tunnel; Dilation and curretage; Eye Implant; Hysterectomy; Lumpectomy, right breast         Medication List  Asmanex Twisthaler 220 mcg/ actuation (120) inhalation aerosol powdr breath activated; gabapentin 800 mg oral tablet; hydrochlorothiazide 25 mg oral tablet; ibuprofen 800 mg oral tablet; losartan 50 mg oral tablet; Norco 7.5-325 mg oral  "tablet; Ozempic 0.25 mg or 0.5 mg(2 mg/1.5 mL) subcutaneous pen injector; pantoprazole 40 mg oral tablet,delayed release (DR/EC); tylenol oral; Ventolin HFA 90 mcg/actuation inhalation HFA aerosol inhaler; Vitamin D2 1,250 mcg (50,000 unit) oral capsule; Voltaren 1 % topical gel         Allergy List  NO KNOWN DRUG ALLERGIES       Allergies Reconciled  Family Medical History  Stroke; Cancer, Unspecified; Diabetes, unspecified type; Family history of certain chronic disabling diseases; arthritis; Osteoporosis; Family history of Arthritis         Social History  Alcohol (Current some day); Alcohol Use (Current some day); Claustophobic (Unknown); lives with children; lives with parents; .; Recreational Drug Use (Never); Retired.; Tobacco (Former); Unemployed.         Immunizations  Name Date Admin   Influenza    Influenza    Influenza          Review of Systems  · Constitutional  o Denies  o : fever, chills  · Eyes  o Denies  o : double vision  · HENT  o Denies  o : vertigo, recent head injury, hearing loss or ringing  · Cardiovascular  o Denies  o : chest pain, irregular heart beats  · Respiratory  o Denies  o : shortness of breath, productive cough  · Gastrointestinal  o Denies  o : nausea, vomiting  · Integument  o Denies  o : hair growth change, new skin lesions  · Neurologic  o Denies  o : altered mental status, tingling or numbness, seizures  · Musculoskeletal  o Denies  o : joint pain, joint swelling, limitation of motion      Vitals  Date Time BP Position Site L\R Cuff Size HR RR TEMP (F) WT  HT  BMI kg/m2 BSA m2 O2 Sat        06/25/2020 03:41 /71 Sitting    70 - R  96.9 329lbs 0oz 5'  4\" 56.47 2.6 96 %          Physical Examination  · Constitutional  o Appearance  o : obese, well developed, no obvious deformities present  · Eyes  o Vision  o : Patient wearing glasses.   · Respiratory  o Respiratory Effort  o : No labored breathing. Good respiratory effort.   · Cardiovascular  o Peripheral " Vascular System  o :   § Pedal Pulses  § : Pedal Pulses are 2+ and symmetrical  § Extremities  § : There is no edema of the lower extremities  · Musculoskeletal  o Extremeties/Joint  o : Lower extremity muscle strength and range of motion is equal and symmetrical bilaterally. The knees are noted to be in normal alignment. Ankle alignment and range of motion is normal and foot structure is normal. No signs of edema, erythema, lymphangitis, nor signs of infection.   · Skin and Subcutaneous Tissue  o General Inspection  o : no lesions present, no areas of discoloration, skin turgor normal, texture normal  · Neurologic  o Sensation  o : Sharp/dull sensation is within normal limits. [place] 5.07 monofilament intact to all assessed areas.   · Toes  o Toes: Right Foot  o :   § Toenails  § : Toenails are hypertrophic, mycotic, dystrophic, brittle toenail(s) at nail 1, 2, 3, 4 with onycholysis of the right foot. The 1st, 2nd, 3rd, 4th toenail(s) on the right have 2 mm in thickness with subungual detritus. There is an incurvated toenail at the distal border of the 1st, 2nd, 3rd, 4th, 5th toe  o Toes: Left Foot  o :   § Toenails  § : There are hypertrophic, mycotic, dystrophic, brittle toenail(s) at the 1, 2, 3, 4, 5 with onycholysis of the left foot. The 1st, 2nd, 3rd, 4th, 5th toenail(s) on the left have 2 mm in thickness with subungual detritus. There is an incurvated toenail at the distal border of the 1st, 2nd, 3rd, 4th, 5th toe  · Procedures  o Nail Debridement  o : Nail debridement is indicated for the following toenails:, left hallux, left 2nd toe, left 3rd toe, left 4th toe, left 5th toe, right hallux, right 2nd toe, right 3rd toe, right 4th toe. The nail was debrided of excessive thickness to appropriate levels of comfort and contour using, nail nippers. The procedure was without complications          Assessment  · Foot pain, left     729.5/M79.672  · Foot pain, right     729.5/M79.671  · Ingrowing  nail     703.0/L60.0  · Tinea unguium     110.1/B35.1  · Obesity     278.00/E66.9      Plan  · Orders  o Debridement of six or more nails (77163, 68117, 04400) - 729.5/M79.672, 729.5/M79.671, 703.0/L60.0, 110.1/B35.1 - 06/25/2020  o ACO-16: BMI above normal range and follow-up plan is documented (, , ) - - 06/25/2020  o ACO-17: Screened for tobacco use AND received tobacco cessation intervention (4004F, 4004F, 4004F) - - 06/25/2020  · Medications  o Medications have been Reconciled  o Transition of Care or Provider Policy  · Instructions  o I have discussed the findings of this evaluation with the patient. The discussion included a complete verbal explanation of any changes in the examination results, diagnosis, and the current treatment plan. A schedule for future care needs was explained. If any questions should arise after returning home, I have encouraged the patient to feel free to contact Dr. Huizar. The patient states understanding and agreement with this plan.   o Patient is to monitor for problems and to contact Dr. Huizar for follow-up should such signs occur. Patient states understanding and agreement with this plan.   o Encouraged to follow-up with Primary Care Provider for preventative care.   o BMI Counseling: Discussed weight loss and the need for exercise.   o Tobacco Cessation Counseling: Encouraged to stop smoking.   o RTC 9 weeks for nail care.  o Patient to monitor for recurrence of symptoms and to contact Dr. Rivas office for a follow-up appointment.  o Electronically Identified Patient Education Materials Provided Electronically  · Disposition  o Call or Return if symptoms worsen or persist.            Electronically Signed by: Ranulfo Huizar DPM -Author on June 25, 2020 04:07:20 PM

## 2021-05-13 NOTE — PROGRESS NOTES
Progress Note      Patient Name: Anita Estrada   Patient ID: [id number]   Sex: Female   YOB: 1960    Primary Care Provider: Valentino Rizo MD   Referring Provider: Valentino Rizo MD    Visit Date: May 28, 2020    Provider: Mary Cast PA-C   Location: [facility]   Location Address: [address] [place] [address]  [id number]   Location Phone: [phone]          Chief Complaint  · Follow up left shoulder arthroscopy      History Of Present Illness  Anita Estrada is a 59 year old /Black female who presents today to [facility].      She is s/p left arthroscopic SAD/DC and mini open RCR 1/22/20 by Dr. Todd. She is doing well. She was released from PT. She states little pain, just some weakness.       Past Medical History  Arthritis; Asthma; Carpal tunnel syndrome; Eczema; Essential hypertension; Foot pain, left; Foot pain, right; Hypertension; Ingrowing nail; Limb Swelling; Lumbar back pain; Migraine Headaches; Obesity; Plantar fascial fibromatosis of right foot; Pressure ulcer, stage 1; Reflux; Sleep apnea with use of continuous positive airway pressure (CPAP); Tinea unguium         Past Surgical History  carpal tunnel; Dilation and curretage; Eye Implant; Hysterectomy; Lumpectomy, right breast         Medication List  Asmanex Twisthaler 220 mcg/ actuation (120) inhalation aerosol powdr breath activated; gabapentin 800 mg oral tablet; hydrochlorothiazide 25 mg oral tablet; ibuprofen 800 mg oral tablet; losartan 50 mg oral tablet; Norco 7.5-325 mg oral tablet; Ozempic 0.25 mg or 0.5 mg(2 mg/1.5 mL) subcutaneous pen injector; pantoprazole 40 mg oral tablet,delayed release (DR/EC); tylenol oral; Ventolin HFA 90 mcg/actuation inhalation HFA aerosol inhaler; Vitamin D2 1,250 mcg (50,000 unit) oral capsule; Voltaren 1 % topical gel         Allergy List  NO KNOWN DRUG ALLERGIES       Allergies Reconciled  Family Medical History  Stroke; Cancer, Unspecified; Diabetes,  "unspecified type; Family history of certain chronic disabling diseases; arthritis; Osteoporosis; Family history of Arthritis         Social History  Alcohol (Current some day); Alcohol Use (Current some day); Claustophobic (Unknown); lives with children; lives with parents; .; Recreational Drug Use (Never); Retired.; Tobacco (Former); Unemployed.         Review of Systems  · Constitutional  o Denies  o : fever, chills, weight loss  · Cardiovascular  o Denies  o : chest pain, shortness of breath  · Gastrointestinal  o Denies  o : liver disease, heartburn, nausea, blood in stools  · Genitourinary  o Denies  o : painful urination, blood in urine  · Integument  o Denies  o : rash, itching  · Neurologic  o Denies  o : headache, weakness, loss of consciousness  · Musculoskeletal  o Admits  o : painful, swollen joints  · Psychiatric  o Denies  o : drug/alcohol addiction, anxiety, depression      Vitals  Date Time BP Position Site L\R Cuff Size HR RR TEMP (F) WT  HT  BMI kg/m2 BSA m2 O2 Sat        05/28/2020 10:50 AM      70 - R   330lbs 0oz 5'  4\" 56.64 2.6 98 %          Physical Examination  · Constitutional  o Appearance  o : well developed, well-nourished, no obvious deformities present  · Head and Face  o Head  o :   § Inspection  § : normocephalic  o Face  o :   § Inspection  § : no facial lesions  · Eyes  o Conjunctivae  o : conjunctivae normal  o Sclerae  o : sclerae white  · Ears, Nose, Mouth and Throat  o Ears  o :   § External Ears  § : appearance within normal limits  § Hearing  § : intact  o Nose  o :   § External Nose  § : appearance normal  · Neck  o Inspection/Palpation  o : normal appearance  o Range of Motion  o : full range of motion  · Respiratory  o Respiratory Effort  o : breathing unlabored  o Inspection of Chest  o : normal appearance  o Auscultation of Lungs  o : no audible wheezing or rales  · Cardiovascular  o Heart  o : regular rate  · Gastrointestinal  o Abdominal Examination  o : soft " and non-tender  · Skin and Subcutaneous Tissue  o General Inspection  o : intact, no rashes  · Psychiatric  o General  o : Alert and oriented x3  o Judgement and Insight  o : judgment and insight intact  o Mood and Affect  o : mood normal, affect appropriate  · Left Shoulder  o Inspection  o : Well healed incisions. Full ROM. Strength 4+/5 with MMT. Neurovasculalry intact.           Assessment  · Aftercare following surgery of the muskuloskeletal system     V54.81  · Pain: Shoulder     719.41/M25.519      Plan  · Medications  o Medications have been Reconciled  o Transition of Care or Provider Policy  · Instructions  o Reviewed the patient's Past Medical, Social, and Family history as well as the ROS at today's visit, no changes.  o Call or return if worsening symptoms.  o Advised too much strain on RCR and precautions. Follow up PRN>   o Electronically Identified Patient Education Materials Provided Electronically            Electronically Signed by: PAULA Nichols-ROSETTA -Author on May 28, 2020 12:06:20 PM  Electronically Co-signed by: Chad Todd MD -Reviewer on May 29, 2020 01:58:43 PM

## 2021-05-13 NOTE — PROGRESS NOTES
Progress Note      Patient Name: Anita Estrada   Patient ID: [id number]   Sex: Female   YOB: 1960    Primary Care Provider: Valentino Rizo MD   Referring Provider: Valentino Rizo MD    Visit Date: July 30, 2020    Provider: Nadia Husain PA-C   Location: [facility]   Location Address: [address] [place] [address]  [id number]   Location Phone: [phone]          Chief Complaint  · Left Knee Osteoarthritis      History Of Present Illness  Anita Estrada is a 59 year old /Black female who presents today to [facility].      Patient is following up for left knee pain, left knee osteoarthritis. Patient states moderate amount of pain on the lateral side of the knee. Patient states pain radiates to left hip. Patient states going to [facility] ED due to increase of pain. Patient states taking Tramadol 50mg for the pain.       Past Medical History  Arthritis; Asthma; Carpal tunnel syndrome; Eczema; Essential hypertension; Foot pain, left; Foot pain, right; Hypertension; Ingrowing nail; Limb Swelling; Lumbar back pain; Migraine Headaches; Obesity; Plantar fascial fibromatosis of right foot; Pressure ulcer, stage 1; Reflux; Sleep apnea with use of continuous positive airway pressure (CPAP); Tinea unguium         Past Surgical History  carpal tunnel; Dilation and curretage; Eye Implant; Hysterectomy; Lumpectomy, right breast         Medication List  Asmanex Twisthaler 220 mcg/ actuation (120) inhalation aerosol powdr breath activated; gabapentin 800 mg oral tablet; hydrochlorothiazide 25 mg oral tablet; ibuprofen 800 mg oral tablet; losartan 50 mg oral tablet; Ozempic 0.25 mg or 0.5 mg(2 mg/1.5 mL) subcutaneous pen injector; pantoprazole 40 mg oral tablet,delayed release (DR/EC); tylenol oral; Ventolin HFA 90 mcg/actuation inhalation HFA aerosol inhaler; Vitamin D2 1,250 mcg (50,000 unit) oral capsule; Voltaren 1 % topical gel         Allergy List  NO KNOWN DRUG ALLERGIES  "        Family Medical History  Stroke; Cancer, Unspecified; Diabetes, unspecified type; Family history of certain chronic disabling diseases; arthritis; Osteoporosis; Family history of Arthritis         Social History  Alcohol (Current some day); Alcohol Use (Current some day); Claustophobic (Unknown); lives with children; lives with parents; .; Recreational Drug Use (Never); Retired.; Tobacco (Former); Unemployed.         Review of Systems  · Constitutional  o Denies  o : fever, chills, weight loss  · Cardiovascular  o Denies  o : chest pain, shortness of breath  · Gastrointestinal  o Denies  o : liver disease, heartburn, nausea, blood in stools  · Genitourinary  o Denies  o : painful urination, blood in urine  · Integument  o Denies  o : rash, itching  · Neurologic  o Denies  o : headache, weakness, loss of consciousness  · Musculoskeletal  o Denies  o : painful, swollen joints  · Psychiatric  o Denies  o : drug/alcohol addiction, anxiety, depression      Vitals  Date Time BP Position Site L\R Cuff Size HR RR TEMP (F) WT  HT  BMI kg/m2 BSA m2 O2 Sat        07/30/2020 08:04 AM         332lbs 16oz 5'  4\" 57.16 2.61           Physical Examination  · Constitutional  o Appearance  o : well developed, well-nourished, no obvious deformities present  · Head and Face  o Head  o :   § Inspection  § : normocephalic  o Face  o :   § Inspection  § : no facial lesions  · Eyes  o Conjunctivae  o : conjunctivae normal  o Sclerae  o : sclerae white  · Ears, Nose, Mouth and Throat  o Ears  o :   § External Ears  § : appearance within normal limits  § Hearing  § : intact  o Nose  o :   § External Nose  § : appearance normal  · Neck  o Inspection/Palpation  o : normal appearance  o Range of Motion  o : full range of motion  · Respiratory  o Respiratory Effort  o : breathing unlabored  o Inspection of Chest  o : normal appearance  o Auscultation of Lungs  o : no audible wheezing or rales  · Cardiovascular  o Heart  o : " regular rate  · Gastrointestinal  o Abdominal Examination  o : soft and non-tender  · Skin and Subcutaneous Tissue  o General Inspection  o : intact, no rashes  · Psychiatric  o General  o : Alert and oriented x3  o Judgement and Insight  o : judgment and insight intact  o Mood and Affect  o : mood normal, affect appropriate  · Injection Note/Aspiration Note  o Site  o : left knee   o Procedure  o : Procedure: After educating the patient, patient gave consent for procedure. After using Chloraprep, the joint space was injected. The patient tolerated the procedure well.   o Medication  o : 80 mg of DepoMedrol with 9cc of 1% Lidocaine  · Left Knee-Street  o Inspection  o : limping gait, weight bearing, no swelling, no ecchymosis, no atrophy, valgus alignment    o Palpation  o : tenderness at medial joint line, tenderness at lateral joint line, no patellar tendon tenderness, no pain of MCL, no pain at LCL  o ROM  o : full extension, full flexion positive crepitus  o Strength  o : full extension, full flexion  o Special Tests  o : negative ballotable effusion , negtive fluid wave, negative patellar compression, negative patellar apprehenison, negative Sindy's test, negative Apley's test, negative anterior drawer, negative posterior drawer, negative lachman's drawer , negative varus stress, positive valgus stress   o Neurovascular  o : Full sensation, Dorsal Pedal Pulse 2+, posteriror tibialis pulse 2+          Assessment  · Primary osteoarthritis of left knee     715.16/M17.12      Plan  · Orders  o Depo-Medrol injection 80mg () - 715.16/M17.12 - 07/30/2020   Lot [id number] Exp 07 2021 [employer] [address] PA C  o Knee Intra-articular Injection without US Guidance [facility] ([id number]) - 715.16/M17.12 - 07/30/2020   Lot [id number] Exp 02 2022 [employer] [address] [facility] C  · Medications  o Medications have been Reconciled  o Transition of Care or Provider  Policy  · Instructions  o Reviewed the patient's Past Medical, Social, and Family history as well as the ROS at today's visit, no changes.  o Call or return if worsening symptoms.  o Electronically Identified Patient Education Materials Provided Electronically     approval for viscosupplementation  Prescribed  brace due to disproportionate thigh to calf muscle ratio             Electronically Signed by: Nadia Husain PA-C -Author on August 11, 2020 12:30:51 PM

## 2021-05-13 NOTE — PROGRESS NOTES
Progress Note      Patient Name: Anita Estrada   Patient ID: [id number]   Sex: Female   YOB: 1960    Primary Care Provider: Valentino Rizo MD   Referring Provider: Valetnino Rizo MD    Visit Date: September 28, 2020    Provider: Ranulfo Huizar DPM   Location: [facility]   Location Address: [address] [place] [address]  [id number]   Location Phone: [phone]          Chief Complaint  · Routine Foot Care Visit      History Of Present Illness  Anita Estrada complains of painful, elongated toenails which are thickened, yellowed, chalky, and cause pain with shoe gear and ambulation.      New, Established, New Problem:  Established   Location:  Toenails  Duration:   Greater than five years  Onset:  Gradual  Nature:  Sore with palpation.  Stable, worsening, improving:   Worsening  Aggravating factors:  Pain with shoe gear and ambulation.  Previous Treatment:  Debridement    Patient denies any fevers, chills, nausea, vomiting, nor any other constitutional signs nor symptoms.    Patient reports the following medical changes since their last visit:    - knee gel injection           Past Medical History  Arthritis; Asthma; Carpal tunnel syndrome; Eczema; Essential hypertension; Foot pain, left; Foot pain, right; Hypertension; Ingrowing nail; Limb Swelling; Lumbar back pain; Migraine Headaches; Obesity; Plantar fascial fibromatosis of right foot; Pressure ulcer, stage 1; Reflux; Sleep apnea with use of continuous positive airway pressure (CPAP); Tinea unguium         Past Surgical History  carpal tunnel; Dilation and curretage; Eye Implant; Hysterectomy; Lumpectomy, right breast         Medication List  Asmanex Twisthaler 220 mcg/ actuation (120) inhalation aerosol powdr breath activated; gabapentin 800 mg oral tablet; hydrochlorothiazide 25 mg oral tablet; ibuprofen 800 mg oral tablet; losartan 50 mg oral tablet; Ozempic 0.25 mg or 0.5 mg(2 mg/1.5 mL) subcutaneous pen injector;  "pantoprazole 40 mg oral tablet,delayed release (DR/EC); tylenol oral; Ventolin HFA 90 mcg/actuation inhalation HFA aerosol inhaler; Vitamin D2 1,250 mcg (50,000 unit) oral capsule; Voltaren 1 % topical gel         Allergy List  NO KNOWN DRUG ALLERGIES       Allergies Reconciled  Family Medical History  Stroke; Cancer, Unspecified; Diabetes, unspecified type; Family history of certain chronic disabling diseases; arthritis; Osteoporosis; Family history of Arthritis         Social History  Alcohol (Current some day); Alcohol Use (Current some day); Claustophobic (Unknown); lives with children; lives with parents; .; Recreational Drug Use (Never); Retired.; Tobacco (Former); Unemployed.         Immunizations  Name Date Admin   Influenza    Influenza    Influenza          Review of Systems  · Constitutional  o Denies  o : fever, chills  · Eyes  o Denies  o : double vision  · HENT  o Denies  o : vertigo, recent head injury, hearing loss or ringing  · Cardiovascular  o Denies  o : chest pain, irregular heart beats  · Respiratory  o Denies  o : shortness of breath, productive cough  · Gastrointestinal  o Denies  o : nausea, vomiting  · Integument  o Denies  o : hair growth change, new skin lesions  · Neurologic  o Denies  o : altered mental status, tingling or numbness, seizures  · Musculoskeletal  o Denies  o : joint pain, joint swelling, limitation of motion      Vitals  Date Time BP Position Site L\R Cuff Size HR RR TEMP (F) WT  HT  BMI kg/m2 BSA m2 O2 Sat        09/28/2020 03:43 /76 Sitting      96.7 332lbs 16oz 5'  4\" 57.16 2.61 100 %          Physical Examination  · Constitutional  o Appearance  o : obese, well developed, no obvious deformities present  · Eyes  o Vision  o : Patient wearing glasses.   · Respiratory  o Respiratory Effort  o : No labored breathing. Good respiratory effort.   · Cardiovascular  o Peripheral Vascular System  o :   § Pedal Pulses  § : Pedal Pulses are 2+ and " symmetrical  § Extremities  § : There is no edema of the lower extremities  · Musculoskeletal  o Extremeties/Joint  o : Lower extremity muscle strength and range of motion is equal and symmetrical bilaterally. The knees are noted to be in normal alignment. Ankle alignment and range of motion is normal and foot structure is normal. No signs of edema, erythema, lymphangitis, nor signs of infection.   · Skin and Subcutaneous Tissue  o General Inspection  o : no lesions present, no areas of discoloration, skin turgor normal, texture normal  · Neurologic  o Sensation  o : Sharp/dull sensation is within normal limits. [place] 5.07 monofilament intact to all assessed areas.   · Toes  o Toes: Right Foot  o :   § Toenails  § : Toenails are hypertrophic, mycotic, dystrophic, brittle toenail(s) at nail 1, 2, 3, 4 with onycholysis of the right foot. The 1st, 2nd, 3rd, 4th toenail(s) on the right have 2 mm in thickness with subungual detritus. There is an incurvated toenail at the distal border of the 1st, 2nd, 3rd, 4th, 5th toe  o Toes: Left Foot  o :   § Toenails  § : There are hypertrophic, mycotic, dystrophic, brittle toenail(s) at the 1, 2, 3, 4, 5 with onycholysis of the left foot. The 1st, 2nd, 3rd, 4th, 5th toenail(s) on the left have 2 mm in thickness with subungual detritus. There is an incurvated toenail at the distal border of the 1st, 2nd, 3rd, 4th, 5th toe  · Procedures  o Nail Debridement  o : Nail debridement is indicated for the following toenails:, left hallux, left 2nd toe, left 3rd toe, left 4th toe, left 5th toe, right hallux, right 2nd toe, right 3rd toe, right 4th toe. The nail was debrided of excessive thickness to appropriate levels of comfort and contour using, nail nippers. The procedure was without complications          Assessment  · Foot pain, left     729.5/M79.672  · Foot pain, right     729.5/M79.671  · Ingrowing nail     703.0/L60.0  · Tinea  unguium     110.1/B35.1  · Obesity     278.00/E66.9      Plan  · Orders  o Debridement of six or more nails (42313, 00578, 84414, 52257) - 729.5/M79.672, 729.5/M79.671, 703.0/L60.0, 110.1/B35.1 - 09/28/2020  o ACO-16: BMI above normal range and follow-up plan is documented (, , , ) - - 09/28/2020  o ACO-17: Screened for tobacco use AND received tobacco cessation intervention (4004F, 4004F, 4004F, 4004F) - - 09/28/2020  · Medications  o Medications have been Reconciled  o Transition of Care or Provider Policy  · Instructions  o I have discussed the findings of this evaluation with the patient. The discussion included a complete verbal explanation of any changes in the examination results, diagnosis, and the current treatment plan. A schedule for future care needs was explained. If any questions should arise after returning home, I have encouraged the patient to feel free to contact Dr. Huizar. The patient states understanding and agreement with this plan.   o Patient is to monitor for problems and to contact Dr. Huizar for follow-up should such signs occur. Patient states understanding and agreement with this plan.   o Encouraged to follow-up with Primary Care Provider for preventative care.   o BMI Counseling: Discussed weight loss and the need for exercise.   o Tobacco Cessation Counseling: Encouraged to stop smoking.   o RTC 9 weeks for nail care.  o Patient to monitor for recurrence of symptoms and to contact Dr. Rivas office for a follow-up appointment.  o Electronically Identified Patient Education Materials Provided Electronically  · Disposition  o Call or Return if symptoms worsen or persist.            Electronically Signed by: Ranulfo Huizar DPM -Author on September 28, 2020 04:03:32 PM

## 2021-05-13 NOTE — PROGRESS NOTES
Progress Note      Patient Name: Anita Estrada   Patient ID: [id number]   Sex: Female   YOB: 1960    Primary Care Provider: Valentino Rizo MD   Referring Provider: Valentino Rizo MD    Visit Date: July 20, 2020    Provider: Valentino Rizo MD   Location: [facility]   Location Address: [address] [place] [address], [address] [place] [address]  [id number]   Location Phone: [phone]          Chief Complaint     Lump in right breast       History Of Present Illness  Anita Estrada is a 59 year old /Black female who presents for evaluation and treatment of:      Pt presents for eval.   She states she has found a new breast lump in her right breast and wants it evaluated. She found the lump this past Friday. Last mammogram was in Nov 2019...and was negative.       Past Medical History  Disease Name Date Onset Notes   Arthritis --  --    Asthma --  --    Carpal tunnel syndrome --  --    Eczema --  --    Essential hypertension 03/02/2015 --    Foot pain, left --  --    Foot pain, right --  --    Hypertension --  --    Ingrowing nail 08/08/2018 --    Limb Swelling --  --    Lumbar back pain --  --    Migraine Headaches --  --    Obesity --  --    Plantar fascial fibromatosis of right foot 10/11/2018 --    Pressure ulcer, stage 1 --  --    Reflux --  --    Sleep apnea with use of continuous positive airway pressure (CPAP) --  --    Tinea unguium --  --          Past Surgical History  Procedure Name Date Notes   carpal tunnel 3/6/2015, 09/15/2017 Right LT CTR    Dilation and curretage 1976 --    Eye Implant --  yes   Hysterectomy 1983??? --    Lumpectomy, right breast 2011 --          Medication List  Name Date Started Instructions   Asmanex Twisthaler 220 mcg/ actuation (120) inhalation aerosol powdr breath activated 10/03/2019 inhale 1 puff (220 mcg) by inhalation route 2 times per day   gabapentin 800 mg oral tablet 07/02/2020 take 1 tablet by oral route 4 times a day as needed for 30 days    hydrochlorothiazide 25 mg oral tablet 05/26/2020 take 0.5 tablet by oral route daily   ibuprofen 800 mg oral tablet 06/08/2020 take 1 tablet (800 mg) by oral route 3 times per day with food prn   losartan 50 mg oral tablet 04/23/2020 take 1 tablet (50 mg) by oral route once daily for 30 days   Ozempic 0.25 mg or 0.5 mg(2 mg/1.5 mL) subcutaneous pen injector 03/30/2020 inject 0.5 mg by subcutaneous route once weekly on the same day of each week, in the abdomen, thighs, or upper arm rotating injection sites   pantoprazole 40 mg oral tablet,delayed release (DR/EC) 06/18/2020 take 1 tablet (40 mg) by oral route once daily for 30 days   tylenol oral  PRN   Ventolin HFA 90 mcg/actuation inhalation HFA aerosol inhaler 03/16/2020 inhale 1 puff (90 mcg) by inhalation route every 6 hours as needed   Vitamin D2 1,250 mcg (50,000 unit) oral capsule 06/01/2020 take 1 capsule by oral route 2 times a week   Voltaren 1 % topical gel 06/29/2020 apply 4 grams to the affected area(s) by topical route 4 times per day         Allergy List  Allergen Name Date Reaction Notes   NO KNOWN DRUG ALLERGIES --  --  --        Allergies Reconciled  Family Medical History  Disease Name Relative/Age Notes   Stroke  grandparent/not specified   Cancer, Unspecified Father/   Father   Diabetes, unspecified type  --    Family history of certain chronic disabling diseases; arthritis Mother/   Mother   Osteoporosis Mother/   Mother   Family history of Arthritis Mother/   Mother         Social History  Finding Status Start/Stop Quantity Notes   Alcohol Current some day --/-- --  --    Alcohol Use Current some day --/-- --  occasionally drinks, 3 drinks per day, has been drinking for 31 or more years   Claustophobic Unknown --/-- --  yes   lives with children --  --/-- --  --    lives with parents --  --/-- --  --    . --  --/-- --  --    Recreational Drug Use Never --/-- --  no   Retired. --  --/-- --  --    Tobacco Former --/-- 0.5 PPD current  "some day smoker, 0.5 pack per day, smoked 21-30 years  some day smoker, 0.5 pack per day, smoked 31 or more years  current some day smoker, 0.5 pack per day, smoked 6-10 years   Unemployed. --  --/-- --  --          Immunizations  NameDate Admin Mfg Trade Name Lot Number Route Inj VIS Given VIS Publication   Wkbwzbhvu30/01/2019 [facility] Fluzone Quadrivalent [id number] IM RD 11/01/2019    Comments: pt tolerated inj well   Ubmftvmxw62/13/2018 [facility] Fluzone Quadrivalent [id number] IM RD 11/13/2018 08/07/2015   Comments: pt left office in stable condition   Mhduxmcku93/29/2017 [facility] Fluarix, quadrivalent, preservative free [id number] IM RD 09/29/2017 07/02/2012   Comments:          Review of Systems  · Constitutional  o Denies  o : fever, weight loss, weight gain  · Cardiovascular  o Denies  o : pedal edema, claudication, chest pressure, palpitations  · Respiratory  o Denies  o : shortness of breath, wheezing, cough, hemoptysis, dyspnea on exertion  · Gastrointestinal  o Denies  o : nausea, vomiting, diarrhea, constipation, abdominal pain      Vitals  Date Time BP Position Site L\R Cuff Size HR RR TEMP (F) WT  HT  BMI kg/m2 BSA m2 O2 Sat        07/20/2020 10:40 /74 Sitting    78 - R  97.6 333lbs 8oz 5'  4\" 57.24 2.61 97 %          Physical Examination  · Constitutional  o Appearance  o : alert, in no acute distress, well developed, well-nourished  · Head and Face  o Head  o : normocephalic, atraumatic, non tender, no palpable masses or nodules.  o Face  o : no facial lesions  · Eyes  o Vision  o : Acuity: grossly normal at distance, Conjuntivae: Normal, Sclerae white  · Respiratory  o Auscultation of Lungs  o : normal breath sounds throughout  · Cardiovascular  o Heart  o : Regular rate and rhythm, Normal S1,S2   · Breasts  o Inspection of Breasts  o : developmental state normal for age  o Palpation of Breasts, Axillae  o : mild TTP mid breast.  · Skin and Subcutaneous Tissue  o General Inspection  o : no rashes , or lesions " present, normal skin color, warm and dry  o Digits and Nails  o : no clubbing, cyanosis, deformities or edema present, normal appearing nails  · Psychiatric  o Mood and Affect  o : normal mood and affect          Assessment  · Right Breast lump     611.72/N63.0  · Right Breast pain     611.71/N64.4       f/u as scheduled  jamal order mammogram.       Problems Reconciled  Plan  · Orders  o ACO-39: Current medications updated and reviewed () - - 07/20/2020  o ACO-14: Influenza immunization administered or previously received () - - 07/20/2020  o Diagnostic Mammogram 3D breast unilateral RIGHT (w/US if needed) [facility] (, -[facility]) - 611.72/N63.0, 611.71/N64.4 - 07/20/2020  · Medications  o Medications have been Reconciled  o Transition of Care or Provider Policy  · Instructions  o Patient was educated/instructed on their diagnosis, treatment and medications prior to discharge from the clinic today.  o Electronically Identified Patient Education Materials Provided Electronically  · Disposition  o Call or Return if symptoms worsen or persist.  o Care Transition            Electronically Signed by: Valentino Rizo MD -Author on July 20, 2020 12:18:16 PM

## 2021-05-13 NOTE — PROGRESS NOTES
Progress Note      Patient Name: Anita Estrada   Patient ID: [id number]   Sex: Female   YOB: 1960    Primary Care Provider: Valentino Rizo MD   Referring Provider: Valentino Rizo MD    Visit Date: September 1, 2020    Provider: Nadia Husain PA-C   Location: [facility]   Location Address: [address] [place] [address]  [id number]   Location Phone: [phone]          Chief Complaint  · Follow up left knee pain      History Of Present Illness  Anita Estrada is a 59 year old /Black female who presents today to [facility].      Patient is following up for left knee pain, left knee osteoarthritis. Patient is here to receive Synvisc injection.       Past Medical History  Arthritis; Asthma; Carpal tunnel syndrome; Eczema; Essential hypertension; Foot pain, left; Foot pain, right; Hypertension; Ingrowing nail; Limb Swelling; Lumbar back pain; Migraine Headaches; Obesity; Plantar fascial fibromatosis of right foot; Pressure ulcer, stage 1; Reflux; Sleep apnea with use of continuous positive airway pressure (CPAP); Tinea unguium         Past Surgical History  carpal tunnel; Dilation and curretage; Eye Implant; Hysterectomy; Lumpectomy, right breast         Medication List  Asmanex Twisthaler 220 mcg/ actuation (120) inhalation aerosol powdr breath activated; gabapentin 800 mg oral tablet; hydrochlorothiazide 25 mg oral tablet; ibuprofen 800 mg oral tablet; losartan 50 mg oral tablet; Ozempic 0.25 mg or 0.5 mg(2 mg/1.5 mL) subcutaneous pen injector; pantoprazole 40 mg oral tablet,delayed release (DR/EC); tylenol oral; Ventolin HFA 90 mcg/actuation inhalation HFA aerosol inhaler; Vitamin D2 1,250 mcg (50,000 unit) oral capsule; Voltaren 1 % topical gel         Allergy List  NO KNOWN DRUG ALLERGIES         Family Medical History  Stroke; Cancer, Unspecified; Diabetes, unspecified type; Family history of certain chronic disabling diseases; arthritis; Osteoporosis;  "Family history of Arthritis         Social History  Alcohol (Current some day); Alcohol Use (Current some day); Claustophobic (Unknown); lives with children; lives with parents; .; Recreational Drug Use (Never); Retired.; Tobacco (Former); Unemployed.         Review of Systems  · Constitutional  o Denies  o : fever, chills, weight loss  · Cardiovascular  o Denies  o : chest pain, shortness of breath  · Gastrointestinal  o Denies  o : liver disease, heartburn, nausea, blood in stools  · Genitourinary  o Denies  o : painful urination, blood in urine  · Integument  o Denies  o : rash, itching  · Neurologic  o Denies  o : headache, weakness, loss of consciousness  · Musculoskeletal  o Denies  o : painful, swollen joints  · Psychiatric  o Denies  o : drug/alcohol addiction, anxiety, depression      Vitals  Date Time BP Position Site L\R Cuff Size HR RR TEMP (F) WT  HT  BMI kg/m2 BSA m2 O2 Sat        09/01/2020 09:39 AM      88 - R   332lbs 16oz 5'  4\" 57.16 2.61 98 %          Physical Examination  · Constitutional  o Appearance  o : well developed, well-nourished, no obvious deformities present  · Head and Face  o Head  o :   § Inspection  § : normocephalic  o Face  o :   § Inspection  § : no facial lesions  · Eyes  o Conjunctivae  o : conjunctivae normal  o Sclerae  o : sclerae white  · Ears, Nose, Mouth and Throat  o Ears  o :   § External Ears  § : appearance within normal limits  § Hearing  § : intact  o Nose  o :   § External Nose  § : appearance normal  · Neck  o Inspection/Palpation  o : normal appearance  o Range of Motion  o : full range of motion  · Respiratory  o Respiratory Effort  o : breathing unlabored  o Inspection of Chest  o : normal appearance  o Auscultation of Lungs  o : no audible wheezing or rales  · Cardiovascular  o Heart  o : regular rate  · Gastrointestinal  o Abdominal Examination  o : soft and non-tender  · Skin and Subcutaneous Tissue  o General Inspection  o : intact, no " rashes  · Psychiatric  o General  o : Alert and oriented x3  o Judgement and Insight  o : judgment and insight intact  o Mood and Affect  o : mood normal, affect appropriate  · Injection Note/Aspiration Note  o Site  o : left knee   o Procedure  o : Procedure: After educating the patient, patient gave consent for procedure. After using Chloraprep, the joint space was injected. The patient tolerated the procedure well.   o Medication  o : Synvisc One 48 mg   · Left Knee-Street  o Inspection  o : no limping gait, weight bearing, no swelling, no ecchymosis, no atrophy, neutral alignment  o Palpation  o : tenderness at medial joint line, tenderness at lateral joint line, no patellar tendon tenderness, no pain of MCL, no pain at LCL  o ROM  o : full extension, full flexion  o Strength  o : full extension, full flexion  o Special Tests  o : negative varus stress, negaitve valgus stress  o Neurovascular  o : Full sensation, Dorsal Pedal Pulse 2+, posteriror tibialis pulse 2+          Assessment  · Primary osteoarthritis of left knee     715.16/M17.12  · Pain: Knee     719.46/M25.569      Plan  · Orders  o Hyaluronan or derivative, Synvisc or Synvisc-One, for intra-ollie () - 715.16/M17.12 - 09/01/2020   Lot [id number] manufactured by [employer] 12 2022  o Knee Intra-articular Injection without US Guidance [facility] ([id number]) - 715.16/M17.12 - 09/01/2020   Administered by Nadia MITCHELL  · Medications  o Medications have been Reconciled  o Transition of Care or Provider Policy  · Instructions  o Reviewed the patient's Past Medical, Social, and Family history as well as the ROS at today's visit, no changes.  o Call or return if worsening symptoms.  o Follow Up PRN.  o Electronically Identified Patient Education Materials Provided Electronically            Electronically Signed by: PAULA Nance-C -Author on September 1, 2020 10:19:28 AM  Electronically Co-signed by: Chad Todd MD -Reviewer on September 1, 2020 07:03:35  PM

## 2021-05-14 VITALS — HEIGHT: 64 IN | OXYGEN SATURATION: 98 % | BODY MASS INDEX: 50.02 KG/M2 | WEIGHT: 293 LBS | HEART RATE: 88 BPM

## 2021-05-14 VITALS
HEART RATE: 71 BPM | HEIGHT: 64 IN | SYSTOLIC BLOOD PRESSURE: 131 MMHG | DIASTOLIC BLOOD PRESSURE: 60 MMHG | WEIGHT: 293 LBS | BODY MASS INDEX: 50.02 KG/M2 | OXYGEN SATURATION: 98 % | TEMPERATURE: 97.5 F

## 2021-05-14 VITALS
HEIGHT: 64 IN | WEIGHT: 293 LBS | DIASTOLIC BLOOD PRESSURE: 76 MMHG | SYSTOLIC BLOOD PRESSURE: 138 MMHG | OXYGEN SATURATION: 100 % | BODY MASS INDEX: 50.02 KG/M2 | TEMPERATURE: 96.7 F

## 2021-05-14 VITALS
WEIGHT: 293 LBS | SYSTOLIC BLOOD PRESSURE: 128 MMHG | BODY MASS INDEX: 50.02 KG/M2 | DIASTOLIC BLOOD PRESSURE: 84 MMHG | TEMPERATURE: 96.9 F | HEIGHT: 64 IN | OXYGEN SATURATION: 95 % | RESPIRATION RATE: 18 BRPM | HEART RATE: 74 BPM

## 2021-05-14 VITALS
DIASTOLIC BLOOD PRESSURE: 79 MMHG | HEIGHT: 64 IN | SYSTOLIC BLOOD PRESSURE: 134 MMHG | OXYGEN SATURATION: 97 % | TEMPERATURE: 97.3 F | HEART RATE: 92 BPM | WEIGHT: 293 LBS | BODY MASS INDEX: 50.02 KG/M2

## 2021-05-14 VITALS — BODY MASS INDEX: 55.32 KG/M2 | WEIGHT: 293 LBS | HEIGHT: 61 IN

## 2021-05-14 NOTE — PROGRESS NOTES
Progress Note      Patient Name: Anita Estrada   Patient ID: [id number]   Sex: Female   YOB: 1960    Primary Care Provider: Valentino Rizo MD   Referring Provider: Valentino Rizo MD    Visit Date: March 2, 2021    Provider: Chad Todd MD   Location: [facility]   Location Address: [address] [place] [address]  [id number]   Location Phone: [phone]          Chief Complaint  · Left Knee Osteoarthritis      History Of Present Illness  Anita Estrada is a 60 year old /Black female who presents today to [facility].      Patient presents today with a follow-up of left knee osteoarthritis that she has been treating conservatively. Patient received a left knee Synvisc One injection on 9/1/20 that has given her relief of pain. She states pain on medial and lateral joint lines. She states pain with prolonged standing and walking. Patient presents today to receive another left knee injection.       Past Medical History  Arthritis; Asthma; Carpal tunnel syndrome; Eczema; Essential hypertension; Foot pain, left; Foot pain, right; Hypertension; Ingrowing nail; Limb Swelling; Lumbar back pain; Migraine Headaches; Obesity; Plantar fascial fibromatosis of right foot; Pressure ulcer, stage 1; Reflux; Sleep apnea with use of continuous positive airway pressure (CPAP); Tinea unguium         Past Surgical History  carpal tunnel; Dilation and curretage; Eye Implant; Hysterectomy; Lumpectomy, right breast         Medication List  ammonium lactate 12 % topical lotion; gabapentin 800 mg oral tablet; hydrochlorothiazide 25 mg oral tablet; IBUPROFEN 800 MG TABS 800 Tablet; losartan 50 mg oral tablet; OZEMPIC 0.25 OR 0.5 MG/DOSE 2 Solution Pen-injector; tylenol oral; VIT D2 1.25 MG (50,000 UNIT 1.25 MG Capsule; Voltaren 1 % topical gel         Allergy List  NO KNOWN DRUG ALLERGIES       Allergies Reconciled  Family Medical History  Stroke; Cancer, Unspecified; Diabetes,  "unspecified type; Family history of certain chronic disabling diseases; arthritis; Osteoporosis; Family history of Arthritis         Social History  Alcohol (Current some day); Alcohol Use (Current some day); Claustophobic (Unknown); lives with children; lives with parents; .; Recreational Drug Use (Never); Retired.; Tobacco (Former); Unemployed.         Immunizations  Name Date Admin   Influenza Refused   Influenza 11/01/2019   Influenza 11/13/2018   Influenza 09/29/2017         Review of Systems  · Constitutional  o Denies  o : fever, chills, weight loss  · Cardiovascular  o Denies  o : chest pain, shortness of breath  · Gastrointestinal  o Denies  o : liver disease, heartburn, nausea, blood in stools  · Genitourinary  o Denies  o : painful urination, blood in urine  · Integument  o Denies  o : rash, itching  · Neurologic  o Denies  o : headache, weakness, loss of consciousness  · Musculoskeletal  o Denies  o : painful, swollen joints  · Psychiatric  o Denies  o : drug/alcohol addiction, anxiety, depression      Vitals  Date Time BP Position Site L\R Cuff Size HR RR TEMP (F) WT  HT  BMI kg/m2 BSA m2 O2 Sat FR L/min FiO2 HC       03/02/2021 02:01 PM         349lbs 0oz 5'  1\" 65.94 2.61             Physical Examination  · Constitutional  o Appearance  o : well developed, well-nourished, no obvious deformities present  · Head and Face  o Head  o :   § Inspection  § : normocephalic  o Face  o :   § Inspection  § : no facial lesions  · Eyes  o Conjunctivae  o : conjunctivae normal  o Sclerae  o : sclerae white  · Ears, Nose, Mouth and Throat  o Ears  o :   § External Ears  § : appearance within normal limits  § Hearing  § : intact  o Nose  o :   § External Nose  § : appearance normal  · Neck  o Inspection/Palpation  o : normal appearance  o Range of Motion  o : full range of motion  · Respiratory  o Respiratory Effort  o : breathing unlabored  o Inspection of Chest  o : normal appearance  o Auscultation of " Lungs  o : no audible wheezing or rales  · Cardiovascular  o Heart  o : regular rate  · Gastrointestinal  o Abdominal Examination  o : soft and non-tender  · Skin and Subcutaneous Tissue  o General Inspection  o : intact, no rashes  · Psychiatric  o General  o : Alert and oriented x3  o Judgement and Insight  o : judgment and insight intact  o Mood and Affect  o : mood normal, affect appropriate  · Left Knee  o Inspection  o : Sensation grossly intact. Neurovascular intact. Skin intact. Tender medial joint line. Tender lateral joint line. No swelling, skin discoloration or atrophy. Calf supple, non-tender. Stable to valgus/varus stress. Good strength in quadriceps, hamstrings, dorsiflexors, and plantar flexors. Full weight bearing. Mildly antalgic gait. -5 degrees of extension. Flexion to 110.   · Injection Note/Aspiration Note  o Site  o : left knee   o Procedure  o : Procedure: After educating the patient, patient gave consent for procedure. After using Chloraprep, the joint space was injected. The patient tolerated the procedure well.   o Medication  o : Synvisc One 48 mg           Assessment  · Primary osteoarthritis of left knee     715.16/M17.12      Plan  · Orders  o Hyaluronan or derivative, Synvisc or Synvisc-One, for intra-ollie () - 715.16/M17.12 - 03/02/2021   Lot [id number] 09 2023 [employer] Administered by SYDNEE Todd MD  o Knee Intra-articular Injection without US Guidance [facility] ([id number]) - 715.16/M17.12 - 03/02/2021  · Medications  o Medications have been Reconciled  o Transition of Care or Provider Policy  · Instructions  o Dr. Todd saw and examined the patient and agrees with plan.   o Reviewed the patient's Past Medical, Social, and Family history as well as the ROS at today's visit, no changes.  o Call or return if worsening symptoms.  o Follow Up PRN.  o The above service was scribed by Marily Ricci on my behalf and I attest to the accuracy of the note. mc  o Discussed diagnosis and treatment  options with the patient. Patient opted for a left knee injection and tolerated it well.             Electronically Signed by: Marily Ricci-, Other -Author on March 4, 2021 12:58:05 PM  Electronically Co-signed by: Chad Todd MD -Reviewer on March 4, 2021 09:01:29 PM

## 2021-05-14 NOTE — PROGRESS NOTES
Progress Note      Patient Name: Anita Estrada   Patient ID: [id number]   Sex: Female   YOB: 1960    Primary Care Provider: Valentino Rizo MD   Referring Provider: Valentino Rizo MD    Visit Date: February 25, 2021    Provider: Valentino Rizo MD   Location: [facility]   Location Address: [address] [place] [address], [address] [place] [address]  [id number]   Location Phone: [phone]          Chief Complaint  · Annual Exam  · (Health Maintainence Information Reviewed Under Results)      History Of Present Illness  Anita Estrada is a 60 year old /Black female who presents for evaluation and treatment of:   Last PAP Smear: hysterectomy.   Date of Last Mammogram: 8/2020.   Date of Last Colonoscopy: 2020       Past Medical History  Disease Name Date Onset Notes   Arthritis --  --    Asthma --  --    Carpal tunnel syndrome --  --    Eczema --  --    Essential hypertension 03/02/2015 --    Foot pain, left --  --    Foot pain, right --  --    Hypertension --  --    Ingrowing nail 08/08/2018 --    Limb Swelling --  --    Lumbar back pain --  --    Migraine Headaches --  --    Obesity --  --    Plantar fascial fibromatosis of right foot 10/11/2018 --    Pressure ulcer, stage 1 --  --    Reflux --  --    Sleep apnea with use of continuous positive airway pressure (CPAP) --  --    Tinea unguium --  --          Past Surgical History  Procedure Name Date Notes   carpal tunnel 3/6/2015, 09/15/2017 Right LT CTR    Dilation and curretage 1976 --    Eye Implant --  yes   Hysterectomy 1983??? --    Lumpectomy, right breast 2011 --          Medication List  Name Date Started Instructions   ammonium lactate 12 % topical lotion 02/25/2021 apply to affected area by topical route BID prn   gabapentin 800 mg oral tablet 10/30/2020 take 1 tablet by oral route 4 times a day as needed for 30 days   hydrochlorothiazide 25 mg oral tablet 11/06/2020 take 0.5 tablet by oral route daily    IBUPROFEN 800 MG TABS 800 Tablet 01/25/2021 TAKE 1 TABLET BY MOUTH THREE TIMES A DAY WITH FOOD AS NEEDED   losartan 50 mg oral tablet 12/03/2020 take 1 tablet (50 mg) by oral route once daily for 30 days   OZEMPIC 0.25 OR 0.5 MG/DOSE 2 Solution Pen-injector 11/02/2020 INJECT 0.5 MG SUBCUTANEOUSLY WEEKLY (ON THE SAME DAY OF EACH WEEK) IN THE ABDOMEN, THIGHS, OR UPPER ARM ROTATING SITES   tylenol oral  PRN   VIT D2 1.25 MG (50,000 UNIT 1.25 MG Capsule 11/30/2020 TAKE 1 CAPSULE BY MOUTH WEEKLY   Voltaren 1 % topical gel 02/25/2021 apply 4 grams to the affected area(s) by topical route 4 times per day         Allergy List  Allergen Name Date Reaction Notes   NO KNOWN DRUG ALLERGIES --  --  --        Allergies Reconciled  Family Medical History  Disease Name Relative/Age Notes   Stroke  grandparent/not specified   Cancer, Unspecified Father/   Father   Diabetes, unspecified type  --    Family history of certain chronic disabling diseases; arthritis Mother/   Mother   Osteoporosis Mother/   Mother   Family history of Arthritis Mother/   Mother         Social History  Finding Status Start/Stop Quantity Notes   Alcohol Current some day --/-- --  --    Alcohol Use Current some day --/-- --  occasionally drinks, 3 drinks per day, has been drinking for 31 or more years   Claustophobic Unknown --/-- --  yes   lives with children --  --/-- --  --    lives with parents --  --/-- --  --    . --  --/-- --  --    Recreational Drug Use Never --/-- --  no   Retired. --  --/-- --  --    Tobacco Former --/-- 0.5 PPD current some day smoker, 0.5 pack per day, smoked 21-30 years  some day smoker, 0.5 pack per day, smoked 31 or more years  current some day smoker, 0.5 pack per day, smoked 6-10 years   Unemployed. --  --/-- --  --          Immunizations  NameDate Admin Mfg Trade Name Lot Number Route Inj VIS Given VIS Publication   InfluenzaRefused 02/25/2021 NE Not Entered  NE NE     Comments:          Review of  "Systems  · Constitutional  o Denies  o : night sweats  · Eyes  o Denies  o : double vision, blurred vision  · HENT  o Denies  o : vertigo, recent head injury  · Breasts  o Denies  o : abnormal changes in breast size, additional breast symptoms except as noted in the HPI  · Cardiovascular  o Denies  o : chest pain, irregular heart beats  · Respiratory  o Denies  o : shortness of breath, productive cough  · Gastrointestinal  o Denies  o : nausea, vomiting  · Genitourinary  o Denies  o : dysuria, urinary retention  · Integument  o Denies  o : hair growth change, new skin lesions  · Neurologic  o Denies  o : altered mental status, seizures  · Musculoskeletal  o Denies  o : joint swelling, limitation of motion  · Endocrine  o Denies  o : cold intolerance, heat intolerance  · Heme-Lymph  o Denies  o : petechiae, lymph node enlargement or tenderness  · Allergic-Immunologic  o Denies  o : frequent illnesses      Vitals  Date Time BP Position Site L\R Cuff Size HR RR TEMP (F) WT  HT  BMI kg/m2 BSA m2 O2 Sat FR L/min FiO2        02/25/2021 03:59 /84 Sitting    74 - R 18 96.9 349lbs 0oz 5'  4\" 59.91 2.67 95 %  21%          Physical Examination  · Constitutional  o Appearance  o : well-nourished, in no acute distress  · Neck  o Inspection/Palpation  o : normal appearance, no masses or tenderness, trachea midline  o Thyroid  o : gland size normal, nontender, no nodules or masses present on palpation  · Respiratory  o Respiratory Effort  o : breathing unlabored  o Inspection of Chest  o : normal appearance  o Auscultation of Lungs  o : normal breath sounds throughout  · Cardiovascular  o Heart  o :   § Auscultation of Heart  § : regular rate and rhythm  · Psychiatric  o Judgement and Insight  o : judgment and insight intact  o Mood and Affect  o : mood normal, affect appropriate          Assessment  · Annual physical exam     V70.0/Z00.00  · Fatigue     780.79/R53.83  · Vitamin D deficiency     268.9/E55.9  · Routine lab " draw     V72.60/Z01.89  · HTN (hypertension)     401.9/I10  · Screening for cardiovascular condition     V81.2/Z13.6  · Elevated glucose     790.29/R73.09       f/u as directed  labs.  mammogram.    f/u in 3months  she has gained 20 lbs in 3 months..she is back on diet and ozempic.         Plan  · Orders  o ACO-14: Influenza immunization administered or previously received [facility] () - - 02/25/2021  o ACO-39: Current medications updated and reviewed (, 1159F) - - 02/25/2021  o Physical, Primary Care Panel (CBC, CMP, Lipid, TSH) [facility] (24095, 51345, 36841, 98705) - - 02/25/2021  o B12 Folate levels (B12FO) - - 02/25/2021  o Hgb A1c [facility] (93671) - 790.29/R73.09 - 02/25/2021  o Urinalysis with Reflex Microscopy (30982) - 401.9/I10, V81.2/Z13.6, 790.29/R73.09, 780.79/R53.83 - 02/25/2021  o Microalbumin urine (99577) - 401.9/I10, V81.2/Z13.6, 790.29/R73.09, 780.79/R53.83 - 02/25/2021  o Vitamin D (25-Hydroxy) Level (24621) - 268.9/E55.9 - 02/25/2021  o Physical, Primary Care Panel (CBC, CMP, Lipid, TSH) [facility] (26992, 79663, 44083, 39944) - 401.9/I10, V81.2/Z13.6, 790.29/R73.09, 780.79/R53.83 - 05/25/2021  o B12 Folate levels (B12FO) - 780.79/R53.83 - 05/25/2021  o Urinalysis with Reflex Microscopy (52687) - 401.9/I10, 790.29/R73.09, 780.79/R53.83, 268.9/E55.9 - 05/25/2021  o Microalbumin urine (29736) - V81.2/Z13.6, 790.29/R73.09, 268.9/E55.9 - 05/25/2021  o Vitamin D (25-Hydroxy) Level (46923) - 268.9/E55.9 - 05/25/2021  · Medications  o Medications have been Reconciled  o Transition of Care or Provider Policy  · Instructions  o Reviewed health maintenance flowsheet and updated information. Orders were placed and/or patient's response was documented.  o Patient was educated/instructed on their diagnosis, treatment and medications prior to discharge from the clinic today.  o Counseled on monthly breast self exams.   o Counseled on STD prevention.  o Counseled on diet and exercise.   o Counseled on weight-bearing  exercise.  o Recommended Calcium with Vitamin D twice daily.  o Electronically Identified Patient Education Materials Provided Electronically  · Disposition  o Call or Return if symptoms worsen or persist.  o Care Transition            Electronically Signed by: Valentino Rizo MD -Author on February 25, 2021 05:48:24 PM

## 2021-05-14 NOTE — PROGRESS NOTES
Progress Note      Patient Name: Anita Estrada   Patient ID: [id number]   Sex: Female   YOB: 1960    Primary Care Provider: Valentino Rizo MD   Referring Provider: Valentino Rizo MD    Visit Date: February 22, 2021    Provider: Ranulfo Huizar DPM   Location: [facility]   Location Address: [address] [place] [address]  [id number]   Location Phone: [phone]          Chief Complaint  · Routine Foot Care Visit      History Of Present Illness  Anita Estrada complains of painful, elongated toenails which are thickened, yellowed, chalky, and cause pain with shoe gear and ambulation.      New, Established, New Problem:  Established   Location:  Toenails  Duration:   Greater than five years  Onset:  Gradual  Nature:  Sore with palpation.  Stable, worsening, improving:   stable  Aggravating factors:  Pain with shoe gear and ambulation.  Previous Treatment:  Debridement    Patient denies any fevers, chills, nausea, vomiting, nor any other constitutional signs nor symptoms.    Patient reports that no changes in their medications with their recent appointment with their primary care provider.       Past Medical History  Arthritis; Asthma; Carpal tunnel syndrome; Eczema; Essential hypertension; Foot pain, left; Foot pain, right; Hypertension; Ingrowing nail; Limb Swelling; Lumbar back pain; Migraine Headaches; Obesity; Plantar fascial fibromatosis of right foot; Pressure ulcer, stage 1; Reflux; Sleep apnea with use of continuous positive airway pressure (CPAP); Tinea unguium         Past Surgical History  carpal tunnel; Dilation and curretage; Eye Implant; Hysterectomy; Lumpectomy, right breast         Medication List  gabapentin 800 mg oral tablet; hydrochlorothiazide 25 mg oral tablet; IBUPROFEN 800 MG TABS 800 Tablet; losartan 50 mg oral tablet; OZEMPIC 0.25 OR 0.5 MG/DOSE 2 Solution Pen-injector; tylenol oral; VIT D2 1.25 MG (50,000 UNIT 1.25 MG Capsule; Voltaren 1 % topical  "gel         Allergy List  NO KNOWN DRUG ALLERGIES       Allergies Reconciled  Family Medical History  Stroke; Cancer, Unspecified; Diabetes, unspecified type; Family history of certain chronic disabling diseases; arthritis; Osteoporosis; Family history of Arthritis         Social History  Alcohol (Current some day); Alcohol Use (Current some day); Claustophobic (Unknown); lives with children; lives with parents; .; Recreational Drug Use (Never); Retired.; Tobacco (Former); Unemployed.         Immunizations  Name Date Admin   Influenza 11/01/2019   Influenza 11/13/2018   Influenza 09/29/2017         Review of Systems  · Constitutional  o Denies  o : fever, chills  · Eyes  o Denies  o : double vision  · HENT  o Denies  o : vertigo, recent head injury, hearing loss or ringing  · Cardiovascular  o Denies  o : chest pain, irregular heart beats  · Respiratory  o Denies  o : shortness of breath, productive cough  · Gastrointestinal  o Denies  o : nausea, vomiting  · Integument  o Denies  o : hair growth change, new skin lesions  · Neurologic  o Denies  o : altered mental status, tingling or numbness, seizures  · Musculoskeletal  o Denies  o : joint pain, joint swelling, limitation of motion      Vitals  Date Time BP Position Site L\R Cuff Size HR RR TEMP (F) WT  HT  BMI kg/m2 BSA m2 O2 Sat FR L/min FiO2 HC       02/22/2021 03:42 /79 Sitting    92 - R  97.3 349lbs 0oz 5'  4\" 59.91 2.67 97 %            Physical Examination  · Constitutional  o Appearance  o : obese, well developed, no obvious deformities present  · Eyes  o Vision  o : Patient wearing glasses.   · Respiratory  o Respiratory Effort  o : No labored breathing. Good respiratory effort.   · Cardiovascular  o Peripheral Vascular System  o :   § Pedal Pulses  § : Pedal Pulses are 2+ and symmetrical  § Extremities  § : There is no edema of the lower extremities  · Musculoskeletal  o Extremeties/Joint  o : Lower extremity muscle strength and range of " motion is equal and symmetrical bilaterally. The knees are noted to be in normal alignment. Ankle alignment and range of motion is normal and foot structure is normal. No signs of edema, erythema, lymphangitis, nor signs of infection.   · Skin and Subcutaneous Tissue  o General Inspection  o : no lesions present, no areas of discoloration, skin turgor normal, texture normal  · Neurologic  o Sensation  o : Sharp/dull sensation is within normal limits. [place] 5.07 monofilament intact to all assessed areas.   · Toes  o Toes: Right Foot  o :   § Toenails  § : Toenails are hypertrophic, mycotic, dystrophic, brittle toenail(s) at nail 1, 2, 3, 4 with onycholysis of the right foot. The 1st, 2nd, 3rd, 4th toenail(s) on the right have 2 mm in thickness with subungual detritus. There is an incurvated toenail at the distal border of the 1st, 2nd, 3rd, 4th, 5th toe  o Toes: Left Foot  o :   § Toenails  § : There are hypertrophic, mycotic, dystrophic, brittle toenail(s) at the 1, 2, 3, 4, 5 with onycholysis of the left foot. The 1st, 2nd, 3rd, 4th, 5th toenail(s) on the left have 2 mm in thickness with subungual detritus. There is an incurvated toenail at the distal border of the 1st, 2nd, 3rd, 4th, 5th toe  · Procedures  o Nail Debridement  o : Nail debridement is indicated for the following toenails:, left hallux, left 2nd toe, left 3rd toe, left 4th toe, left 5th toe, right hallux, right 2nd toe, right 3rd toe, right 4th toe. The nail was debrided of excessive thickness to appropriate levels of comfort and contour using, nail nippers. The procedure was without complications          Assessment  · Foot pain, left     729.5/M79.672  · Foot pain, right     729.5/M79.671  · Ingrowing nail     703.0/L60.0  · Tinea unguium     110.1/B35.1  · Obesity     278.00/E66.9      Plan  · Orders  o Debridement of six or more nails (47828, 97428, 99901, 64921, 14379, 83333) - 729.5/M79.672, 729.5/M79.671, 703.0/L60.0, 110.1/B35.1 -  02/22/2021  o ACO-16: BMI above normal range and follow-up plan is documented (, , , , , ) - - 02/22/2021  o ACO-17: Screened for tobacco use AND received tobacco cessation intervention (4004F, 4004F, 4004F, 4004F, 4004F, 4004F) - - 02/22/2021  · Medications  o Medications have been Reconciled  o Transition of Care or Provider Policy  · Instructions  o I have discussed the findings of this evaluation with the patient. The discussion included a complete verbal explanation of any changes in the examination results, diagnosis, and the current treatment plan. A schedule for future care needs was explained. If any questions should arise after returning home, I have encouraged the patient to feel free to contact Dr. Huizar. The patient states understanding and agreement with this plan.   o Patient is to monitor for problems and to contact Dr. Huizar for follow-up should such signs occur. Patient states understanding and agreement with this plan.   o Encouraged to follow-up with Primary Care Provider for preventative care.   o BMI Counseling: Discussed weight loss and the need for exercise.   o Tobacco Cessation Counseling: Encouraged to stop smoking.   o RTC 9 weeks for nail care.  o Patient to monitor for recurrence of symptoms and to contact Dr. Rivas office for a follow-up appointment.  o Electronically Identified Patient Education Materials Provided Electronically  · Disposition  o Call or Return if symptoms worsen or persist.            Electronically Signed by: Ranulfo Huizar DPM -Author on February 22, 2021 04:01:32 PM

## 2021-05-15 VITALS
DIASTOLIC BLOOD PRESSURE: 69 MMHG | TEMPERATURE: 97.5 F | HEIGHT: 64 IN | WEIGHT: 293 LBS | BODY MASS INDEX: 50.02 KG/M2 | HEART RATE: 72 BPM | SYSTOLIC BLOOD PRESSURE: 127 MMHG | OXYGEN SATURATION: 97 %

## 2021-05-15 VITALS — WEIGHT: 293 LBS | HEIGHT: 64 IN | BODY MASS INDEX: 50.02 KG/M2

## 2021-05-15 VITALS
OXYGEN SATURATION: 96 % | BODY MASS INDEX: 50.02 KG/M2 | WEIGHT: 293 LBS | TEMPERATURE: 96.9 F | DIASTOLIC BLOOD PRESSURE: 71 MMHG | SYSTOLIC BLOOD PRESSURE: 136 MMHG | HEART RATE: 70 BPM | HEIGHT: 64 IN

## 2021-05-15 VITALS
BODY MASS INDEX: 50.02 KG/M2 | HEIGHT: 64 IN | HEART RATE: 78 BPM | DIASTOLIC BLOOD PRESSURE: 74 MMHG | TEMPERATURE: 97.6 F | WEIGHT: 293 LBS | SYSTOLIC BLOOD PRESSURE: 141 MMHG | OXYGEN SATURATION: 97 %

## 2021-05-15 VITALS — OXYGEN SATURATION: 96 % | BODY MASS INDEX: 50.02 KG/M2 | HEART RATE: 78 BPM | HEIGHT: 64 IN | WEIGHT: 293 LBS

## 2021-05-15 VITALS
HEIGHT: 64 IN | WEIGHT: 293 LBS | BODY MASS INDEX: 50.02 KG/M2 | HEART RATE: 96 BPM | TEMPERATURE: 97.6 F | SYSTOLIC BLOOD PRESSURE: 140 MMHG | OXYGEN SATURATION: 96 % | DIASTOLIC BLOOD PRESSURE: 74 MMHG

## 2021-05-15 VITALS — BODY MASS INDEX: 50.02 KG/M2 | HEART RATE: 71 BPM | WEIGHT: 293 LBS | HEIGHT: 64 IN | OXYGEN SATURATION: 97 %

## 2021-05-15 VITALS
OXYGEN SATURATION: 98 % | SYSTOLIC BLOOD PRESSURE: 124 MMHG | DIASTOLIC BLOOD PRESSURE: 61 MMHG | BODY MASS INDEX: 50.02 KG/M2 | HEART RATE: 73 BPM | HEIGHT: 64 IN | WEIGHT: 293 LBS

## 2021-05-15 VITALS — HEIGHT: 64 IN | WEIGHT: 293 LBS | OXYGEN SATURATION: 99 % | HEART RATE: 70 BPM | BODY MASS INDEX: 50.02 KG/M2

## 2021-05-15 VITALS — HEIGHT: 64 IN | WEIGHT: 293 LBS | BODY MASS INDEX: 50.02 KG/M2 | OXYGEN SATURATION: 97 % | HEART RATE: 85 BPM

## 2021-05-15 VITALS — BODY MASS INDEX: 50.02 KG/M2 | WEIGHT: 293 LBS | HEIGHT: 64 IN

## 2021-05-15 VITALS — RESPIRATION RATE: 12 BRPM | HEIGHT: 64 IN | WEIGHT: 293 LBS | BODY MASS INDEX: 50.02 KG/M2

## 2021-05-15 VITALS
HEART RATE: 73 BPM | DIASTOLIC BLOOD PRESSURE: 72 MMHG | HEIGHT: 64 IN | SYSTOLIC BLOOD PRESSURE: 144 MMHG | TEMPERATURE: 96.7 F | WEIGHT: 293 LBS | OXYGEN SATURATION: 95 % | BODY MASS INDEX: 50.02 KG/M2

## 2021-05-15 VITALS — HEIGHT: 64 IN | BODY MASS INDEX: 50.02 KG/M2 | RESPIRATION RATE: 14 BRPM | WEIGHT: 293 LBS

## 2021-05-15 VITALS — WEIGHT: 293 LBS | HEIGHT: 64 IN | HEART RATE: 70 BPM | BODY MASS INDEX: 50.02 KG/M2 | OXYGEN SATURATION: 98 %

## 2021-05-16 VITALS
BODY MASS INDEX: 50.02 KG/M2 | HEIGHT: 64 IN | HEART RATE: 74 BPM | SYSTOLIC BLOOD PRESSURE: 158 MMHG | WEIGHT: 293 LBS | DIASTOLIC BLOOD PRESSURE: 98 MMHG

## 2021-05-16 VITALS
SYSTOLIC BLOOD PRESSURE: 145 MMHG | TEMPERATURE: 97.3 F | OXYGEN SATURATION: 96 % | HEIGHT: 64 IN | BODY MASS INDEX: 50.02 KG/M2 | HEART RATE: 75 BPM | DIASTOLIC BLOOD PRESSURE: 82 MMHG | WEIGHT: 293 LBS | DIASTOLIC BLOOD PRESSURE: 77 MMHG | SYSTOLIC BLOOD PRESSURE: 138 MMHG

## 2021-05-16 VITALS
OXYGEN SATURATION: 96 % | BODY MASS INDEX: 50.02 KG/M2 | WEIGHT: 293 LBS | HEART RATE: 73 BPM | HEIGHT: 64 IN | DIASTOLIC BLOOD PRESSURE: 79 MMHG | TEMPERATURE: 96.8 F | SYSTOLIC BLOOD PRESSURE: 149 MMHG

## 2021-05-16 VITALS — OXYGEN SATURATION: 98 % | WEIGHT: 293 LBS | BODY MASS INDEX: 50.02 KG/M2 | HEART RATE: 65 BPM | HEIGHT: 64 IN

## 2021-05-16 VITALS
HEART RATE: 98 BPM | WEIGHT: 293 LBS | SYSTOLIC BLOOD PRESSURE: 144 MMHG | HEIGHT: 64 IN | DIASTOLIC BLOOD PRESSURE: 77 MMHG | BODY MASS INDEX: 50.02 KG/M2 | OXYGEN SATURATION: 99 %

## 2021-05-16 VITALS — HEART RATE: 79 BPM | WEIGHT: 293 LBS | OXYGEN SATURATION: 99 % | BODY MASS INDEX: 50.02 KG/M2 | HEIGHT: 64 IN

## 2021-05-16 VITALS — HEART RATE: 73 BPM | HEIGHT: 64 IN | WEIGHT: 293 LBS | OXYGEN SATURATION: 99 % | BODY MASS INDEX: 50.02 KG/M2

## 2021-05-16 VITALS
TEMPERATURE: 97.7 F | HEART RATE: 80 BPM | BODY MASS INDEX: 50.02 KG/M2 | WEIGHT: 293 LBS | OXYGEN SATURATION: 96 % | SYSTOLIC BLOOD PRESSURE: 151 MMHG | DIASTOLIC BLOOD PRESSURE: 88 MMHG | HEIGHT: 64 IN

## 2021-05-16 VITALS — WEIGHT: 293 LBS | HEART RATE: 94 BPM | HEIGHT: 64 IN | OXYGEN SATURATION: 92 % | BODY MASS INDEX: 50.02 KG/M2

## 2021-05-16 VITALS
WEIGHT: 293 LBS | HEIGHT: 64 IN | BODY MASS INDEX: 50.02 KG/M2 | OXYGEN SATURATION: 97 % | HEART RATE: 70 BPM | SYSTOLIC BLOOD PRESSURE: 142 MMHG | DIASTOLIC BLOOD PRESSURE: 68 MMHG

## 2021-05-16 VITALS
DIASTOLIC BLOOD PRESSURE: 60 MMHG | SYSTOLIC BLOOD PRESSURE: 132 MMHG | HEART RATE: 85 BPM | WEIGHT: 293 LBS | BODY MASS INDEX: 50.02 KG/M2 | HEIGHT: 64 IN | OXYGEN SATURATION: 95 % | TEMPERATURE: 96.3 F

## 2021-05-16 VITALS
HEIGHT: 64 IN | BODY MASS INDEX: 50.02 KG/M2 | OXYGEN SATURATION: 95 % | WEIGHT: 293 LBS | DIASTOLIC BLOOD PRESSURE: 82 MMHG | SYSTOLIC BLOOD PRESSURE: 133 MMHG | HEART RATE: 68 BPM

## 2021-05-16 VITALS — HEART RATE: 70 BPM | OXYGEN SATURATION: 96 % | BODY MASS INDEX: 50.02 KG/M2 | HEIGHT: 64 IN | WEIGHT: 293 LBS

## 2021-05-16 VITALS — OXYGEN SATURATION: 97 % | WEIGHT: 293 LBS | HEIGHT: 64 IN | BODY MASS INDEX: 50.02 KG/M2 | HEART RATE: 69 BPM

## 2021-05-16 VITALS
RESPIRATION RATE: 20 BRPM | TEMPERATURE: 97 F | BODY MASS INDEX: 50.02 KG/M2 | DIASTOLIC BLOOD PRESSURE: 78 MMHG | HEART RATE: 96 BPM | OXYGEN SATURATION: 95 % | WEIGHT: 293 LBS | SYSTOLIC BLOOD PRESSURE: 151 MMHG | HEIGHT: 64 IN

## 2021-05-16 VITALS — BODY MASS INDEX: 50.02 KG/M2 | OXYGEN SATURATION: 98 % | HEART RATE: 65 BPM | HEIGHT: 64 IN | WEIGHT: 293 LBS

## 2021-06-14 RX ORDER — IBUPROFEN 800 MG/1
TABLET ORAL
Qty: 90 TABLET | Refills: 1 | Status: SHIPPED | OUTPATIENT
Start: 2021-06-14 | End: 2021-09-20

## 2021-06-22 RX ORDER — AMMONIUM LACTATE 12 G/100G
LOTION TOPICAL
Qty: 226 G | Refills: 3 | Status: SHIPPED | OUTPATIENT
Start: 2021-06-22 | End: 2021-10-14

## 2021-07-07 DIAGNOSIS — M17.12 OSTEOARTHRITIS OF LEFT KNEE, UNSPECIFIED OSTEOARTHRITIS TYPE: Primary | ICD-10-CM

## 2021-07-19 RX ORDER — LOSARTAN POTASSIUM 50 MG/1
TABLET ORAL
Qty: 30 TABLET | Refills: 6 | Status: SHIPPED | OUTPATIENT
Start: 2021-07-19 | End: 2022-02-04

## 2021-07-28 ENCOUNTER — OFFICE VISIT (OUTPATIENT)
Dept: SLEEP MEDICINE | Facility: HOSPITAL | Age: 61
End: 2021-07-28

## 2021-07-28 DIAGNOSIS — G47.33 OSA (OBSTRUCTIVE SLEEP APNEA): Primary | ICD-10-CM

## 2021-07-28 PROCEDURE — G0463 HOSPITAL OUTPT CLINIC VISIT: HCPCS

## 2021-07-28 NOTE — PROGRESS NOTES
[facility]    SLEEP CLINIC FOLLOW UP PROGRESS NOTE.    Anita Estrada  1960  60 y.o.  female      PCP: Valentino Rizo MD    The patient is here today for follow-up visit.  Reason for follow-up visit: Obstructive sleep apnea.  Date of visit: 2021    No chief complaint on file.      HPI:  This is a 60 y.o. years old patient who has a history of obstructive sleep apnea is here for  the annual compliance follow-up.  Sleep apnea is moderate in severity with a AHI of 24.5/hr. Patient is using positive airway pressure therapy  and the symptoms of snoring, non-restorative sleep and daytime excessive sleepiness have improved significantly on the therapy. Normally goes to bed at any time and wakes up at any time however, she says that she makes sure that she gets at least 6 hours of sleep during the night..  The patient wakes up a few time(s) during the night and has no problem going back to sleep.  Feels refreshed after waking up.  Patient also denies headaches and nasal congestion.   She reports that she is doing well as far as her CPAP use is concerned.  She  says that her CPAP unit is one of the machines that is being recalled and is concerned that this as she cannot sleep without her CPAP unit.  She has been  using her CPAP machine regularly.  She was wondering whether she could use her daughter's CPAP machine which is a ResMed unit.  Says that her daughter passed away a few months  ago.    Mediactions and allergies are reviewed by me and documented in the encounter.     SOCIAL ( habits pertaining to sleep medicine)  History of smoking:No   History of alcohol use: 3 beers  per week  Caffeine use: 0  She reports that her 87-year-old mother whom she is taking care of  in May 2020 and 1-1/2 months later her daughter also passed away.  She has gained approximately 4 pounds since her last follow-up visit.    REVIEW OF SYSTEMS:   Latty Sleepiness Scale :Total score: 14   Nsaal congestion:  No  Dry mouth/nose: Yes  Post nasal drip; no  Acid reflux/Heartburn: Yes  Abd bloating: No  Morining headache: No  Anxiety: Yes  Depression: Yes    PHYSICAL EXAMINATION:  CONSTITUTIONAL:There were no vitals filed for this visit. There is no height or weight on file to calculate BMI.  Weight 348 pound height 61 inch BMI  Neck: Short neck.  No masses noted.  No carotid bruits  RESP SYSTEM: Breath sounds are normal, no wheezes or crackles  CARDIOVASULAR: Heart rate is regular without murmur. No edema  Neuropsych: She is awake alert and oriented.  Responses are coherent and relevant.  Mood and affect appeared appropriate.    Data reviewed:  The Smart card downloaded on 7/28/2021 has been reviewed independently by me for compliance and discussed the data with the patient.   Compliance; 96.7%  More than 4 hr use, 84.4%  Average use of the device 6 hours 11 minutes per night  Residual AHI: 1.1 /hr (goal < 5.0 /hr)  Mask type: Nasal pillow  DME: [employer]    ASSESSMENT AND PLAN:  · Obstructive sleep apnea ( G 47.33).  The symptoms of sleep apnea have improved with the device and the treatment.  Patient's compliance with the device is excellent for treatment of sleep apnea.  I have independently reviewed the smart card down load and discussed with the patient the download data and encouraged  the patient to continue to use the device.The residual AHI is acceptable. The patient is also instructed to get the supplies from the [employer] and and change them on a regular basis.  A prescription for supplies has been sent to the [employer].  I have also discussed the good sleep hygiene habits and adequate amount of sleep needed for good health.  · She was advised to check with her [employer] company regarding the possibility of using her daughter's CPAP unit.  If this is possible, she was advised to call and let us know so we can order for the machine to be set at her current pressure settings of 9 cm water.  · Obesity. I have discuss the  relationship between the weight and sleep apnea. The benefit of weight loss in reducing severity of sleep apnea was discussed.   · Return in about 1 year (around 7/28/2022). . Patient's questions were answered.  She is going to return earlier if needed.      Yesika Araiza MD  7/28/2021

## 2021-08-24 ENCOUNTER — OFFICE VISIT (OUTPATIENT)
Dept: FAMILY MEDICINE CLINIC | Facility: CLINIC | Age: 61
End: 2021-08-24

## 2021-08-24 VITALS
TEMPERATURE: 96.5 F | WEIGHT: 293 LBS | RESPIRATION RATE: 18 BRPM | BODY MASS INDEX: 62.9 KG/M2 | DIASTOLIC BLOOD PRESSURE: 78 MMHG | SYSTOLIC BLOOD PRESSURE: 128 MMHG | OXYGEN SATURATION: 96 % | HEART RATE: 66 BPM

## 2021-08-24 DIAGNOSIS — E55.9 VITAMIN D DEFICIENCY: ICD-10-CM

## 2021-08-24 DIAGNOSIS — E78.2 MIXED HYPERLIPIDEMIA: ICD-10-CM

## 2021-08-24 DIAGNOSIS — I10 ESSENTIAL (PRIMARY) HYPERTENSION: ICD-10-CM

## 2021-08-24 DIAGNOSIS — G62.9 POLYNEUROPATHY: ICD-10-CM

## 2021-08-24 DIAGNOSIS — R53.83 FATIGUE, UNSPECIFIED TYPE: ICD-10-CM

## 2021-08-24 DIAGNOSIS — R73.09 ELEVATED GLUCOSE: ICD-10-CM

## 2021-08-24 PROBLEM — L60.0 INGROWN NAIL: Status: ACTIVE | Noted: 2018-08-08

## 2021-08-24 PROBLEM — L30.9 ECZEMA: Status: ACTIVE | Noted: 2021-08-24

## 2021-08-24 PROBLEM — G47.30 SLEEP APNEA WITH USE OF CONTINUOUS POSITIVE AIRWAY PRESSURE (CPAP): Status: ACTIVE | Noted: 2021-08-24

## 2021-08-24 PROBLEM — M19.90 ARTHRITIS: Status: ACTIVE | Noted: 2021-08-24

## 2021-08-24 PROBLEM — E66.9 OBESITY: Status: ACTIVE | Noted: 2017-10-05

## 2021-08-24 PROBLEM — K21.9 ACID REFLUX: Status: ACTIVE | Noted: 2021-08-24

## 2021-08-24 PROBLEM — M79.89 LIMB SWELLING: Status: ACTIVE | Noted: 2021-08-24

## 2021-08-24 PROBLEM — M54.50 LUMBAR BACK PAIN: Status: ACTIVE | Noted: 2021-08-24

## 2021-08-24 PROBLEM — G56.00 CARPAL TUNNEL SYNDROME: Status: ACTIVE | Noted: 2021-08-24

## 2021-08-24 PROBLEM — G43.009 MIGRAINE WITHOUT AURA: Status: ACTIVE | Noted: 2021-08-24

## 2021-08-24 PROBLEM — M72.2 PLANTAR FASCIAL FIBROMATOSIS: Status: ACTIVE | Noted: 2018-10-11

## 2021-08-24 PROBLEM — M17.12 PRIMARY OSTEOARTHRITIS OF LEFT KNEE: Status: ACTIVE | Noted: 2017-10-06

## 2021-08-24 PROBLEM — M51.369 DEGENERATIVE DISC DISEASE, LUMBAR: Status: ACTIVE | Noted: 2017-12-14

## 2021-08-24 PROBLEM — B35.1 TINEA UNGUIUM: Status: ACTIVE | Noted: 2021-08-24

## 2021-08-24 PROBLEM — M51.36 DEGENERATIVE DISC DISEASE, LUMBAR: Status: ACTIVE | Noted: 2017-12-14

## 2021-08-24 PROBLEM — L89.91 PRESSURE ULCER, STAGE 1: Status: ACTIVE | Noted: 2021-08-24

## 2021-08-24 PROBLEM — M79.673 PAIN OF FOOT: Status: ACTIVE | Noted: 2021-08-24

## 2021-08-24 PROCEDURE — 96372 THER/PROPH/DIAG INJ SC/IM: CPT | Performed by: FAMILY MEDICINE

## 2021-08-24 PROCEDURE — 99213 OFFICE O/P EST LOW 20 MIN: CPT | Performed by: FAMILY MEDICINE

## 2021-08-24 RX ORDER — LAMOTRIGINE 25 MG/1
25 TABLET ORAL 2 TIMES DAILY
COMMUNITY
Start: 2021-07-15 | End: 2022-04-07

## 2021-08-24 RX ORDER — ERGOCALCIFEROL 1.25 MG/1
50000 CAPSULE ORAL WEEKLY
COMMUNITY
End: 2021-08-24 | Stop reason: SDUPTHER

## 2021-08-24 RX ORDER — IVERMECTIN 3 MG/1
3 TABLET ORAL ONCE
Qty: 1 TABLET | Refills: 0 | Status: CANCELLED | OUTPATIENT
Start: 2021-08-24 | End: 2021-08-24

## 2021-08-24 RX ORDER — CYANOCOBALAMIN 1000 UG/ML
1 INJECTION, SOLUTION INTRAMUSCULAR; SUBCUTANEOUS
COMMUNITY
Start: 2021-04-15 | End: 2021-09-14

## 2021-08-24 RX ORDER — CYANOCOBALAMIN 1000 UG/ML
1000 INJECTION, SOLUTION INTRAMUSCULAR; SUBCUTANEOUS
Status: SHIPPED | OUTPATIENT
Start: 2021-08-24

## 2021-08-24 RX ORDER — GABAPENTIN 800 MG/1
800 TABLET ORAL 3 TIMES DAILY PRN
Qty: 90 TABLET | Refills: 1 | Status: SHIPPED | OUTPATIENT
Start: 2021-08-24 | End: 2021-08-27

## 2021-08-24 RX ORDER — HYDROCHLOROTHIAZIDE 25 MG/1
12.5 TABLET ORAL DAILY
COMMUNITY
Start: 2021-05-03 | End: 2021-10-25 | Stop reason: SDUPTHER

## 2021-08-24 RX ORDER — ERGOCALCIFEROL 1.25 MG/1
50000 CAPSULE ORAL WEEKLY
Qty: 12 CAPSULE | Refills: 1 | Status: SHIPPED | OUTPATIENT
Start: 2021-08-24 | End: 2022-04-07 | Stop reason: SDUPTHER

## 2021-08-24 RX ORDER — SEMAGLUTIDE 1.34 MG/ML
0.5 INJECTION, SOLUTION SUBCUTANEOUS WEEKLY
Qty: 1.5 ML | Refills: 3 | Status: SHIPPED | OUTPATIENT
Start: 2021-08-24 | End: 2021-11-09 | Stop reason: SDUPTHER

## 2021-08-24 RX ORDER — GABAPENTIN 800 MG/1
800 TABLET ORAL 4 TIMES DAILY
COMMUNITY
End: 2021-08-24 | Stop reason: SDUPTHER

## 2021-08-24 RX ORDER — SEMAGLUTIDE 1.34 MG/ML
0.5 INJECTION, SOLUTION SUBCUTANEOUS WEEKLY
COMMUNITY
End: 2021-08-24 | Stop reason: SDUPTHER

## 2021-08-24 RX ADMIN — CYANOCOBALAMIN 1000 MCG: 1000 INJECTION, SOLUTION INTRAMUSCULAR; SUBCUTANEOUS at 17:26

## 2021-08-24 NOTE — PROGRESS NOTES
Chief Complaint   Patient presents with   • Hypertension     3 month follow up      Subjective   Anita Estrada is a 60 y.o. female who presents to the office for follow-up.     Hx of HTN, HLD, elevated glucose. Chronic pain/and neuropathy. She is due for refills.  She is due for labs.      The following portions of the patient's history were reviewed and updated as appropriate: allergies, current medications, past family history, past medical history, past social history, past surgical history and problem list.    Review of Systems   Constitutional: Negative for chills, fever and unexpected weight loss.   Respiratory: Negative for cough, chest tightness and shortness of breath.    Cardiovascular: Negative for chest pain and palpitations.   Gastrointestinal: Negative for abdominal pain, constipation, diarrhea, nausea and vomiting.        Objective   Vitals:    08/24/21 1653   BP: 128/78   BP Location: Left arm   Patient Position: Sitting   Cuff Size: Large Adult   Pulse: 66   Resp: 18   Temp: 96.5 °F (35.8 °C)   SpO2: 96%   Weight: (!) 151 kg (332 lb 14.4 oz)     Body mass index is 62.9 kg/m².  Physical Exam  Vitals reviewed.   Constitutional:       General: She is not in acute distress.     Appearance: Normal appearance. She is not ill-appearing.   HENT:      Head: Normocephalic.   Eyes:      Conjunctiva/sclera: Conjunctivae normal.   Cardiovascular:      Rate and Rhythm: Normal rate and regular rhythm.   Pulmonary:      Effort: Pulmonary effort is normal.      Breath sounds: Normal breath sounds.   Skin:     Findings: No lesion or rash.   Neurological:      Mental Status: She is alert and oriented to person, place, and time.   Psychiatric:         Mood and Affect: Mood normal.          Assessment/Plan   Diagnoses and all orders for this visit:    1. Essential (primary) hypertension  -     CBC Auto Differential; Future  -     Comprehensive Metabolic Panel; Future  -     Lipid Panel; Future  -     TSH; Future  -      T4, Free; Future  -     Microalbumin / Creatinine Urine Ratio - Urine, Clean Catch; Future  -     Urinalysis With Microscopic - Urine, Clean Catch; Future    2. Mixed hyperlipidemia    3. Elevated glucose  -     Semaglutide,0.25 or 0.5MG/DOS, (Ozempic, 0.25 or 0.5 MG/DOSE,) 2 MG/1.5ML solution pen-injector; Inject 0.5 mg under the skin into the appropriate area as directed 1 (One) Time Per Week.  Dispense: 1.5 mL; Refill: 3  -     Hemoglobin A1c; Future    4. Vitamin D deficiency  -     Vitamin D 25 Hydroxy; Future    5. Fatigue, unspecified type  -     Vitamin B12 & Folate; Future  -     cyanocobalamin injection 1,000 mcg    6. Polyneuropathy  -     gabapentin (NEURONTIN) 800 MG tablet; Take 1 tablet by mouth 3 (Three) Times a Day As Needed (neuropathy).  Dispense: 90 tablet; Refill: 1    Other orders  -     Cancel: ivermectin (STROMECTOL) 3 MG tablet tablet; Take 1 tablet by mouth 1 (One) Time for 1 dose.  Dispense: 1 tablet; Refill: 0  -     vitamin D (ERGOCALCIFEROL) 1.25 MG (55896 UT) capsule capsule; Take 1 capsule by mouth 1 (One) Time Per Week.  Dispense: 12 capsule; Refill: 1               Return in about 3 months (around 11/24/2021).   PHQ-2/PHQ-9 Depression Screening 8/24/2021   Little interest or pleasure in doing things 0   Feeling down, depressed, or hopeless 0   Total Score 0     maryjo

## 2021-08-25 ENCOUNTER — TELEPHONE (OUTPATIENT)
Dept: ORTHOPEDIC SURGERY | Facility: CLINIC | Age: 61
End: 2021-08-25

## 2021-08-25 ENCOUNTER — LAB (OUTPATIENT)
Dept: LAB | Facility: HOSPITAL | Age: 61
End: 2021-08-25

## 2021-08-25 DIAGNOSIS — I10 ESSENTIAL (PRIMARY) HYPERTENSION: ICD-10-CM

## 2021-08-25 DIAGNOSIS — E55.9 VITAMIN D DEFICIENCY: ICD-10-CM

## 2021-08-25 DIAGNOSIS — R53.83 FATIGUE, UNSPECIFIED TYPE: ICD-10-CM

## 2021-08-25 DIAGNOSIS — R73.09 ELEVATED GLUCOSE: ICD-10-CM

## 2021-08-25 LAB
25(OH)D3 SERPL-MCNC: 20.4 NG/ML
ALBUMIN SERPL-MCNC: 3.7 G/DL (ref 3.5–5.2)
ALBUMIN/GLOB SERPL: 1.1 G/DL
ALP SERPL-CCNC: 61 U/L (ref 39–117)
ALT SERPL W P-5'-P-CCNC: 12 U/L (ref 1–33)
ANION GAP SERPL CALCULATED.3IONS-SCNC: 8.9 MMOL/L (ref 5–15)
AST SERPL-CCNC: 14 U/L (ref 1–32)
BACTERIA UR QL AUTO: ABNORMAL /HPF
BASOPHILS # BLD AUTO: 0.04 10*3/MM3 (ref 0–0.2)
BASOPHILS NFR BLD AUTO: 0.6 % (ref 0–1.5)
BILIRUB SERPL-MCNC: 0.2 MG/DL (ref 0–1.2)
BILIRUB UR QL STRIP: NEGATIVE
BUN SERPL-MCNC: 18 MG/DL (ref 8–23)
BUN/CREAT SERPL: 17.3 (ref 7–25)
CALCIUM SPEC-SCNC: 8.6 MG/DL (ref 8.6–10.5)
CHLORIDE SERPL-SCNC: 104 MMOL/L (ref 98–107)
CHOLEST SERPL-MCNC: 173 MG/DL (ref 0–200)
CLARITY UR: CLEAR
CO2 SERPL-SCNC: 25.1 MMOL/L (ref 22–29)
COLOR UR: YELLOW
CREAT SERPL-MCNC: 1.04 MG/DL (ref 0.57–1)
DEPRECATED RDW RBC AUTO: 43.7 FL (ref 37–54)
EOSINOPHIL # BLD AUTO: 0.1 10*3/MM3 (ref 0–0.4)
EOSINOPHIL NFR BLD AUTO: 1.5 % (ref 0.3–6.2)
ERYTHROCYTE [DISTWIDTH] IN BLOOD BY AUTOMATED COUNT: 12.7 % (ref 12.3–15.4)
FOLATE SERPL-MCNC: 6.32 NG/ML (ref 4.78–24.2)
GFR SERPL CREATININE-BSD FRML MDRD: 65 ML/MIN/1.73
GLOBULIN UR ELPH-MCNC: 3.3 GM/DL
GLUCOSE SERPL-MCNC: 75 MG/DL (ref 65–99)
GLUCOSE UR STRIP-MCNC: NEGATIVE MG/DL
HBA1C MFR BLD: 5.5 % (ref 4.8–5.6)
HCT VFR BLD AUTO: 38.9 % (ref 34–46.6)
HDLC SERPL-MCNC: 78 MG/DL (ref 40–60)
HGB BLD-MCNC: 12.2 G/DL (ref 12–15.9)
HGB UR QL STRIP.AUTO: NEGATIVE
HYALINE CASTS UR QL AUTO: ABNORMAL /LPF
IMM GRANULOCYTES # BLD AUTO: 0.01 10*3/MM3 (ref 0–0.05)
IMM GRANULOCYTES NFR BLD AUTO: 0.2 % (ref 0–0.5)
KETONES UR QL STRIP: NEGATIVE
LDLC SERPL CALC-MCNC: 79 MG/DL (ref 0–100)
LDLC/HDLC SERPL: 1 {RATIO}
LEUKOCYTE ESTERASE UR QL STRIP.AUTO: ABNORMAL
LYMPHOCYTES # BLD AUTO: 3.12 10*3/MM3 (ref 0.7–3.1)
LYMPHOCYTES NFR BLD AUTO: 46.8 % (ref 19.6–45.3)
MCH RBC QN AUTO: 29.2 PG (ref 26.6–33)
MCHC RBC AUTO-ENTMCNC: 31.4 G/DL (ref 31.5–35.7)
MCV RBC AUTO: 93.1 FL (ref 79–97)
MONOCYTES # BLD AUTO: 0.51 10*3/MM3 (ref 0.1–0.9)
MONOCYTES NFR BLD AUTO: 7.7 % (ref 5–12)
NEUTROPHILS NFR BLD AUTO: 2.88 10*3/MM3 (ref 1.7–7)
NEUTROPHILS NFR BLD AUTO: 43.2 % (ref 42.7–76)
NITRITE UR QL STRIP: NEGATIVE
NRBC BLD AUTO-RTO: 0 /100 WBC (ref 0–0.2)
PH UR STRIP.AUTO: 5.5 [PH] (ref 5–8)
PLATELET # BLD AUTO: 223 10*3/MM3 (ref 140–450)
PMV BLD AUTO: 11 FL (ref 6–12)
POTASSIUM SERPL-SCNC: 4 MMOL/L (ref 3.5–5.2)
PROT SERPL-MCNC: 7 G/DL (ref 6–8.5)
PROT UR QL STRIP: ABNORMAL
RBC # BLD AUTO: 4.18 10*6/MM3 (ref 3.77–5.28)
RBC # UR: ABNORMAL /HPF
REF LAB TEST METHOD: ABNORMAL
SODIUM SERPL-SCNC: 138 MMOL/L (ref 136–145)
SP GR UR STRIP: 1.02 (ref 1–1.03)
SQUAMOUS #/AREA URNS HPF: ABNORMAL /HPF
T4 FREE SERPL-MCNC: 0.97 NG/DL (ref 0.93–1.7)
TRIGL SERPL-MCNC: 85 MG/DL (ref 0–150)
TSH SERPL DL<=0.05 MIU/L-ACNC: 3.97 UIU/ML (ref 0.27–4.2)
UROBILINOGEN UR QL STRIP: ABNORMAL
VIT B12 BLD-MCNC: >2000 PG/ML (ref 211–946)
VLDLC SERPL-MCNC: 16 MG/DL (ref 5–40)
WBC # BLD AUTO: 6.66 10*3/MM3 (ref 3.4–10.8)
WBC UR QL AUTO: ABNORMAL /HPF

## 2021-08-25 PROCEDURE — 82607 VITAMIN B-12: CPT

## 2021-08-25 PROCEDURE — 82746 ASSAY OF FOLIC ACID SERUM: CPT

## 2021-08-25 PROCEDURE — 81001 URINALYSIS AUTO W/SCOPE: CPT

## 2021-08-25 PROCEDURE — 85025 COMPLETE CBC W/AUTO DIFF WBC: CPT

## 2021-08-25 PROCEDURE — 36415 COLL VENOUS BLD VENIPUNCTURE: CPT

## 2021-08-25 PROCEDURE — 83036 HEMOGLOBIN GLYCOSYLATED A1C: CPT

## 2021-08-25 PROCEDURE — 84439 ASSAY OF FREE THYROXINE: CPT

## 2021-08-25 PROCEDURE — 82306 VITAMIN D 25 HYDROXY: CPT

## 2021-08-25 PROCEDURE — 80053 COMPREHEN METABOLIC PANEL: CPT

## 2021-08-25 PROCEDURE — 80061 LIPID PANEL: CPT

## 2021-08-25 PROCEDURE — 84443 ASSAY THYROID STIM HORMONE: CPT

## 2021-08-25 NOTE — TELEPHONE ENCOUNTER
Patient is requesting a gel injection in her left knee. She has [employer]. Her phone number is [phone].

## 2021-08-26 ENCOUNTER — TELEPHONE (OUTPATIENT)
Dept: FAMILY MEDICINE CLINIC | Facility: CLINIC | Age: 61
End: 2021-08-26

## 2021-08-26 DIAGNOSIS — G62.9 POLYNEUROPATHY: ICD-10-CM

## 2021-08-26 DIAGNOSIS — N39.0 URINARY TRACT INFECTION WITHOUT HEMATURIA, SITE UNSPECIFIED: Primary | ICD-10-CM

## 2021-08-26 NOTE — TELEPHONE ENCOUNTER
----- Message from Valentino Rizo MD sent at 8/26/2021  4:06 AM EDT -----  Please call and let pt know that sugars are controlled. The kidney function midlly elevated. Stay hydrated. The urine appears she may have a UTI. So I would like for her to come in tomorrow or next week to give another urine test so I can check culture. Place order for urine culture. Dx UTI.   Vitamin D low. Stay on the once weekly vitamin D. Cholesterrol good.  Does she need any refills on anything? Please ask her, thanks.

## 2021-08-26 NOTE — TELEPHONE ENCOUNTER
Patient stated that her Gabapentin was sent in as 3 times a day when she is taking it 4 times a day. In [place] it states the prescription is 4 times.

## 2021-08-26 NOTE — TELEPHONE ENCOUNTER
Per patients insurance request faxed PA form with clinical notes to [employer]  @ [phone] for Left knee Synvisc One authorization.    Patient notified PA started and our office will contact her once we recieve a response from her insurance. Patient advised it could take several weeks to receive a response. Patient verbalized understanding.

## 2021-08-26 NOTE — TELEPHONE ENCOUNTER
Caller: Anita Estrada    Relationship: Self    Best call back number: [phone]     What is the best time to reach you: ANY      What was the call regarding: PT CALLED TO RELAY HER RX FOR THE GABIPINTIN IS SUPPOSED TO SAY 4 TIMES A DAY AND IT ONLY SAYS 3 TIMES A DAY.  SHE WOULD ALSO LIKE SOMEONE TO EXPLAIN HER LAB RESULTS TO HER.  PLEASE ADVISE, THANK YOU.    Do you require a callback: YES

## 2021-08-27 RX ORDER — GABAPENTIN 800 MG/1
800 TABLET ORAL 4 TIMES DAILY PRN
Qty: 120 TABLET | Refills: 1 | Status: SHIPPED | OUTPATIENT
Start: 2021-08-27 | End: 2021-10-14

## 2021-09-02 ENCOUNTER — LAB (OUTPATIENT)
Dept: LAB | Facility: HOSPITAL | Age: 61
End: 2021-09-02

## 2021-09-02 ENCOUNTER — CLINICAL SUPPORT (OUTPATIENT)
Dept: FAMILY MEDICINE CLINIC | Facility: CLINIC | Age: 61
End: 2021-09-02

## 2021-09-02 DIAGNOSIS — N39.0 URINARY TRACT INFECTION WITHOUT HEMATURIA, SITE UNSPECIFIED: ICD-10-CM

## 2021-09-02 DIAGNOSIS — G62.9 POLYNEUROPATHY: ICD-10-CM

## 2021-09-02 DIAGNOSIS — E53.8 B12 DEFICIENCY: ICD-10-CM

## 2021-09-02 LAB
BACTERIA UR QL AUTO: NORMAL /HPF
BILIRUB UR QL STRIP: NEGATIVE
CLARITY UR: ABNORMAL
COLOR UR: YELLOW
GLUCOSE UR STRIP-MCNC: NEGATIVE MG/DL
HGB UR QL STRIP.AUTO: NEGATIVE
HYALINE CASTS UR QL AUTO: NORMAL /LPF
KETONES UR QL STRIP: NEGATIVE
LEUKOCYTE ESTERASE UR QL STRIP.AUTO: ABNORMAL
NITRITE UR QL STRIP: NEGATIVE
PH UR STRIP.AUTO: 6 [PH] (ref 5–8)
PROT UR QL STRIP: ABNORMAL
RBC # UR: NORMAL /HPF
REF LAB TEST METHOD: NORMAL
SP GR UR STRIP: 1.02 (ref 1–1.03)
SQUAMOUS #/AREA URNS HPF: NORMAL /HPF
UROBILINOGEN UR QL STRIP: ABNORMAL
WBC UR QL AUTO: NORMAL /HPF

## 2021-09-02 PROCEDURE — 87086 URINE CULTURE/COLONY COUNT: CPT

## 2021-09-02 PROCEDURE — 96372 THER/PROPH/DIAG INJ SC/IM: CPT | Performed by: FAMILY MEDICINE

## 2021-09-02 PROCEDURE — 81001 URINALYSIS AUTO W/SCOPE: CPT

## 2021-09-02 RX ADMIN — CYANOCOBALAMIN 1000 MCG: 1000 INJECTION, SOLUTION INTRAMUSCULAR; SUBCUTANEOUS at 14:57

## 2021-09-03 LAB — BACTERIA SPEC AEROBE CULT: NO GROWTH

## 2021-09-07 ENCOUNTER — TELEPHONE (OUTPATIENT)
Dept: FAMILY MEDICINE CLINIC | Facility: CLINIC | Age: 61
End: 2021-09-07

## 2021-09-07 NOTE — TELEPHONE ENCOUNTER
----- Message from Valentino Rizo MD sent at 9/6/2021  7:33 PM EDT -----  Please call and let pt know that the urine culture is negative/normal. Thanks.

## 2021-09-14 ENCOUNTER — OFFICE VISIT (OUTPATIENT)
Dept: ORTHOPEDIC SURGERY | Facility: CLINIC | Age: 61
End: 2021-09-14

## 2021-09-14 VITALS — BODY MASS INDEX: 50.02 KG/M2 | HEART RATE: 69 BPM | HEIGHT: 64 IN | OXYGEN SATURATION: 98 % | WEIGHT: 293 LBS

## 2021-09-14 DIAGNOSIS — M17.12 PRIMARY LOCALIZED OSTEOARTHROSIS OF LEFT LOWER LEG: Primary | ICD-10-CM

## 2021-09-14 PROCEDURE — 20610 DRAIN/INJ JOINT/BURSA W/O US: CPT | Performed by: PHYSICIAN ASSISTANT

## 2021-09-14 PROCEDURE — 99214 OFFICE O/P EST MOD 30 MIN: CPT | Performed by: PHYSICIAN ASSISTANT

## 2021-09-14 NOTE — PROGRESS NOTES
"Chief Complaint  Pain of the Left Knee    Subjective          Anita Estrada presents to [facility] for follow-up on left knee, history of left knee osteoarthritis.  She received a left knee Synvisc injection 3/2/2021.  She states that this provided significant relief until around the end of July.  No new injuries.  She states the pain is diffuse in the knee.  She uses Voltaren gel for pain relief.  She is requesting left knee Synvisc injection today.    Objective   No Known Allergies    Vital Signs:   Pulse 69   Ht 162.6 cm (64\")   Wt (!) 153 kg (338 lb 6.4 oz)   SpO2 98%   BMI 58.09 kg/m²       Physical Exam  Constitutional:       Appearance: Normal appearance. Patient is well-developed and normal weight.   HENT:      Head: Normocephalic.      Right Ear: Hearing and external ear normal.      Left Ear: Hearing and external ear normal.      Nose: Nose normal.   Eyes:      Conjunctiva/sclera: Conjunctivae normal.   Cardiovascular:      Rate and Rhythm: Normal rate.   Pulmonary:      Effort: Pulmonary effort is normal.      Breath sounds: No wheezing or rales.   Abdominal:      Palpations: Abdomen is soft.      Tenderness: There is no abdominal tenderness.   Musculoskeletal:      Cervical back: Normal range of motion.   Skin:     Findings: No rash.   Neurological:      Mental Status: Patient is alert and oriented to person, place, and time.   Psychiatric:         Mood and Affect: Mood and affect normal.         Judgment: Judgment normal.     Ortho Exam  Left knee: No tenderness or swelling, full flexion and extension, skin intact, stable to varus valgus stress, gait mildly antalgic, sensation light touch intact, posterior tib pulses 2+, good range of motion left ankle and digits  Result Review :            Imaging Results (Most Recent)     None         Large Joint Arthrocentesis: L knee  Date/Time: 9/14/2021 9:08 AM  Consent given by: patient  Site marked: site marked  Timeout: " Immediately prior to procedure a time out was called to verify the correct patient, procedure, equipment, support staff and site/side marked as required   Supporting Documentation  Indications: pain   Procedure Details  Location: knee - L knee  Needle gauge: 21.  Medications administered: 48 mg hylan 48 MG/6ML              Assessment and Plan    Problem List Items Addressed This Visit        Musculoskeletal and Injuries    Primary localized osteoarthrosis of left lower leg - Primary    Current Assessment & Plan     Patient elected to receive left knee Synvisc injection, tolerated well, risk and benefits discussed, recommend resting icing and elevating over the next 1 to 2 days following the injection.  She would like to follow-up as needed.               Follow Up   Return if symptoms worsen or fail to improve.  Patient Instructions   Patient elected to receive left knee Synvisc injection, tolerated well, risk and benefits discussed, recommend resting icing and elevating over the next 1 to 2 days following the injection.  She would like to follow-up as needed.    Patient was given instructions and counseling regarding her condition or for health maintenance advice. Please see specific information pulled into the AVS if appropriate.

## 2021-09-14 NOTE — PATIENT INSTRUCTIONS
Patient elected to receive left knee Synvisc injection, tolerated well, risk and benefits discussed, recommend resting icing and elevating over the next 1 to 2 days following the injection.  She would like to follow-up as needed.

## 2021-09-20 RX ORDER — IBUPROFEN 800 MG/1
TABLET ORAL
Qty: 90 TABLET | Refills: 1 | Status: SHIPPED | OUTPATIENT
Start: 2021-09-20 | End: 2021-11-12

## 2021-09-27 ENCOUNTER — CLINICAL SUPPORT (OUTPATIENT)
Dept: FAMILY MEDICINE CLINIC | Facility: CLINIC | Age: 61
End: 2021-09-27

## 2021-09-27 DIAGNOSIS — E53.8 B12 DEFICIENCY: ICD-10-CM

## 2021-09-27 PROCEDURE — 96372 THER/PROPH/DIAG INJ SC/IM: CPT | Performed by: FAMILY MEDICINE

## 2021-09-27 RX ADMIN — CYANOCOBALAMIN 1000 MCG: 1000 INJECTION, SOLUTION INTRAMUSCULAR; SUBCUTANEOUS at 16:24

## 2021-10-11 ENCOUNTER — CLINICAL SUPPORT (OUTPATIENT)
Dept: FAMILY MEDICINE CLINIC | Facility: CLINIC | Age: 61
End: 2021-10-11

## 2021-10-11 DIAGNOSIS — E53.8 B12 DEFICIENCY: Primary | ICD-10-CM

## 2021-10-11 PROCEDURE — 96372 THER/PROPH/DIAG INJ SC/IM: CPT | Performed by: FAMILY MEDICINE

## 2021-10-11 RX ORDER — CYANOCOBALAMIN 1000 UG/ML
1000 INJECTION, SOLUTION INTRAMUSCULAR; SUBCUTANEOUS
Status: SHIPPED | OUTPATIENT
Start: 2021-10-11

## 2021-10-11 RX ADMIN — CYANOCOBALAMIN 1000 MCG: 1000 INJECTION, SOLUTION INTRAMUSCULAR; SUBCUTANEOUS at 16:21

## 2021-10-12 DIAGNOSIS — G62.9 POLYNEUROPATHY: ICD-10-CM

## 2021-10-14 RX ORDER — AMMONIUM LACTATE 12 G/100G
LOTION TOPICAL
Qty: 226 G | Refills: 10 | Status: SHIPPED | OUTPATIENT
Start: 2021-10-14 | End: 2022-12-05

## 2021-10-14 RX ORDER — GABAPENTIN 800 MG/1
TABLET ORAL
Qty: 120 TABLET | Refills: 1 | Status: SHIPPED | OUTPATIENT
Start: 2021-10-14 | End: 2022-01-10

## 2021-10-19 ENCOUNTER — CLINICAL SUPPORT (OUTPATIENT)
Dept: FAMILY MEDICINE CLINIC | Facility: CLINIC | Age: 61
End: 2021-10-19

## 2021-10-19 DIAGNOSIS — Z23 NEED FOR INFLUENZA VACCINATION: Primary | ICD-10-CM

## 2021-10-19 PROCEDURE — 90686 IIV4 VACC NO PRSV 0.5 ML IM: CPT | Performed by: FAMILY MEDICINE

## 2021-10-19 PROCEDURE — 90471 IMMUNIZATION ADMIN: CPT | Performed by: FAMILY MEDICINE

## 2021-10-25 ENCOUNTER — CLINICAL SUPPORT (OUTPATIENT)
Dept: FAMILY MEDICINE CLINIC | Facility: CLINIC | Age: 61
End: 2021-10-25

## 2021-10-25 DIAGNOSIS — E53.8 B12 DEFICIENCY: ICD-10-CM

## 2021-10-25 PROCEDURE — 96372 THER/PROPH/DIAG INJ SC/IM: CPT | Performed by: FAMILY MEDICINE

## 2021-10-25 RX ORDER — HYDROCHLOROTHIAZIDE 25 MG/1
12.5 TABLET ORAL DAILY
Qty: 15 TABLET | Refills: 5 | Status: SHIPPED | OUTPATIENT
Start: 2021-10-25 | End: 2022-04-07

## 2021-10-25 RX ADMIN — CYANOCOBALAMIN 1000 MCG: 1000 INJECTION, SOLUTION INTRAMUSCULAR; SUBCUTANEOUS at 08:26

## 2021-10-28 ENCOUNTER — APPOINTMENT (OUTPATIENT)
Dept: GENERAL RADIOLOGY | Facility: HOSPITAL | Age: 61
End: 2021-10-28

## 2021-10-28 ENCOUNTER — HOSPITAL ENCOUNTER (EMERGENCY)
Facility: HOSPITAL | Age: 61
Discharge: HOME | End: 2021-10-28
Attending: EMERGENCY MEDICINE | Admitting: EMERGENCY MEDICINE

## 2021-10-28 VITALS
OXYGEN SATURATION: 98 % | WEIGHT: 293 LBS | SYSTOLIC BLOOD PRESSURE: 162 MMHG | HEIGHT: 64 IN | RESPIRATION RATE: 20 BRPM | TEMPERATURE: 98.3 F | HEART RATE: 77 BPM | DIASTOLIC BLOOD PRESSURE: 68 MMHG | BODY MASS INDEX: 50.02 KG/M2

## 2021-10-28 DIAGNOSIS — S30.0XXA CONTUSION OF SACRUM, INITIAL ENCOUNTER: Primary | ICD-10-CM

## 2021-10-28 DIAGNOSIS — M54.41 LOW BACK PAIN DUE TO BILATERAL SCIATICA: ICD-10-CM

## 2021-10-28 DIAGNOSIS — M54.42 LOW BACK PAIN DUE TO BILATERAL SCIATICA: ICD-10-CM

## 2021-10-28 PROCEDURE — 25010000002 KETOROLAC TROMETHAMINE PER 15 MG: Performed by: NURSE PRACTITIONER

## 2021-10-28 PROCEDURE — 96372 THER/PROPH/DIAG INJ SC/IM: CPT

## 2021-10-28 PROCEDURE — 25010000002 DEXAMETHASONE PER 1 MG: Performed by: NURSE PRACTITIONER

## 2021-10-28 PROCEDURE — 72220 X-RAY EXAM SACRUM TAILBONE: CPT

## 2021-10-28 PROCEDURE — 99282 EMERGENCY DEPT VISIT SF MDM: CPT

## 2021-10-28 RX ORDER — KETOROLAC TROMETHAMINE 10 MG/1
10 TABLET, FILM COATED ORAL EVERY 6 HOURS PRN
Qty: 12 TABLET | Refills: 0 | Status: SHIPPED | OUTPATIENT
Start: 2021-10-28 | End: 2021-11-12

## 2021-10-28 RX ORDER — KETOROLAC TROMETHAMINE 30 MG/ML
30 INJECTION, SOLUTION INTRAMUSCULAR; INTRAVENOUS ONCE
Status: COMPLETED | OUTPATIENT
Start: 2021-10-28 | End: 2021-10-28

## 2021-10-28 RX ORDER — CYCLOBENZAPRINE HCL 10 MG
10 TABLET ORAL 3 TIMES DAILY PRN
Qty: 12 TABLET | Refills: 0 | Status: SHIPPED | OUTPATIENT
Start: 2021-10-28 | End: 2021-11-09

## 2021-10-28 RX ORDER — DEXAMETHASONE SODIUM PHOSPHATE 10 MG/ML
10 INJECTION INTRAMUSCULAR; INTRAVENOUS ONCE
Status: COMPLETED | OUTPATIENT
Start: 2021-10-28 | End: 2021-10-28

## 2021-10-28 RX ADMIN — DEXAMETHASONE SODIUM PHOSPHATE 10 MG: 10 INJECTION INTRAMUSCULAR; INTRAVENOUS at 13:37

## 2021-10-28 RX ADMIN — KETOROLAC TROMETHAMINE 30 MG: 30 INJECTION, SOLUTION INTRAMUSCULAR; INTRAVENOUS at 13:36

## 2021-11-09 ENCOUNTER — OFFICE VISIT (OUTPATIENT)
Dept: FAMILY MEDICINE CLINIC | Facility: CLINIC | Age: 61
End: 2021-11-09

## 2021-11-09 VITALS
WEIGHT: 293 LBS | SYSTOLIC BLOOD PRESSURE: 126 MMHG | BODY MASS INDEX: 56.56 KG/M2 | OXYGEN SATURATION: 96 % | HEART RATE: 77 BPM | DIASTOLIC BLOOD PRESSURE: 74 MMHG | TEMPERATURE: 97.5 F | RESPIRATION RATE: 18 BRPM

## 2021-11-09 DIAGNOSIS — R73.09 ELEVATED GLUCOSE: ICD-10-CM

## 2021-11-09 DIAGNOSIS — M54.50 LOW BACK PAIN, UNSPECIFIED BACK PAIN LATERALITY, UNSPECIFIED CHRONICITY, UNSPECIFIED WHETHER SCIATICA PRESENT: Primary | ICD-10-CM

## 2021-11-09 PROCEDURE — 96372 THER/PROPH/DIAG INJ SC/IM: CPT | Performed by: FAMILY MEDICINE

## 2021-11-09 PROCEDURE — 99213 OFFICE O/P EST LOW 20 MIN: CPT | Performed by: FAMILY MEDICINE

## 2021-11-09 RX ORDER — SEMAGLUTIDE 1.34 MG/ML
0.5 INJECTION, SOLUTION SUBCUTANEOUS WEEKLY
Qty: 1.5 ML | Refills: 3 | Status: SHIPPED | OUTPATIENT
Start: 2021-11-09 | End: 2021-11-11 | Stop reason: SDUPTHER

## 2021-11-09 RX ORDER — PREDNISONE 20 MG/1
TABLET ORAL
Qty: 10 TABLET | Refills: 0 | Status: SHIPPED | OUTPATIENT
Start: 2021-11-09 | End: 2022-02-07

## 2021-11-09 RX ORDER — METHOCARBAMOL 500 MG/1
500 TABLET, FILM COATED ORAL 3 TIMES DAILY PRN
Qty: 30 TABLET | Refills: 1 | Status: SHIPPED | OUTPATIENT
Start: 2021-11-09 | End: 2021-11-29

## 2021-11-09 RX ADMIN — CYANOCOBALAMIN 1000 MCG: 1000 INJECTION, SOLUTION INTRAMUSCULAR; SUBCUTANEOUS at 15:31

## 2021-11-09 NOTE — PROGRESS NOTES
Chief Complaint   Patient presents with   • Leg Pain     Fell 2 weeks ago    • Back Pain     Subjective   Anita Estrada is a 60 y.o. female who presents to the office back pain.  She fell onto the floor at home after a chair broke she was sitting in. Since then she has had intermittent back pain (sacral region) radiates to the bilateral lower extremities.  She went to ED, xrays shows no fractures, she was diagnosed with contusion sacrum. Pain is about a 6/10 intermittent. She is alrady on gabapentin. In Ed given ketorolac.       The following portions of the patient's history were reviewed and updated as appropriate: allergies, current medications, past family history, past medical history, past social history, past surgical history and problem list.    Review of Systems   Constitutional: Negative for chills, fever and unexpected weight loss.   Respiratory: Negative for cough, chest tightness and shortness of breath.    Cardiovascular: Negative for chest pain and palpitations.   Gastrointestinal: Negative for abdominal pain, constipation, diarrhea, nausea and vomiting.        Objective   Vitals:    11/09/21 1459   BP: 126/74   BP Location: Left arm   Patient Position: Sitting   Cuff Size: Large Adult   Pulse: 77   Resp: 18   Temp: 97.5 °F (36.4 °C)   SpO2: 96%   Weight: (!) 149 kg (329 lb 8 oz)     Body mass index is 56.56 kg/m².  Physical Exam  Vitals reviewed.   Constitutional:       General: She is not in acute distress.     Appearance: Normal appearance. She is not ill-appearing.   HENT:      Head: Normocephalic.   Eyes:      Conjunctiva/sclera: Conjunctivae normal.   Cardiovascular:      Rate and Rhythm: Normal rate and regular rhythm.   Pulmonary:      Effort: Pulmonary effort is normal.      Breath sounds: Normal breath sounds.   Skin:     Findings: No lesion or rash.   Neurological:      Mental Status: She is alert and oriented to person, place, and time.   Psychiatric:         Mood and Affect: Mood  normal.          Assessment/Plan   Diagnoses and all orders for this visit:    1. Low back pain, unspecified back pain laterality, unspecified chronicity, unspecified whether sciatica present (Primary)    2. Elevated glucose  -     Discontinue: Semaglutide,0.25 or 0.5MG/DOS, (Ozempic, 0.25 or 0.5 MG/DOSE,) 2 MG/1.5ML solution pen-injector; Inject 0.5 mg under the skin into the appropriate area as directed 1 (One) Time Per Week.  Dispense: 1.5 mL; Refill: 3  -     Semaglutide,0.25 or 0.5MG/DOS, (Ozempic, 0.25 or 0.5 MG/DOSE,) 2 MG/1.5ML solution pen-injector; Inject 0.5 mg under the skin into the appropriate area as directed 1 (One) Time Per Week.  Dispense: 1.5 mL; Refill: 3    Other orders  -     methocarbamol (Robaxin) 500 MG tablet; Take 1 tablet by mouth 3 (Three) Times a Day As Needed for Muscle Spasms.  Dispense: 30 tablet; Refill: 1  -     predniSONE (DELTASONE) 20 MG tablet; Take 2 tablets po daily  Dispense: 10 tablet; Refill: 0           Get back on ibuprofen  Muscle relaxer given  Steroids given.    F/u as directed  If no improvement, an MRI will be done.        Return for Next scheduled follow up.   PHQ-2/PHQ-9 Depression Screening 8/24/2021   Little interest or pleasure in doing things 0   Feeling down, depressed, or hopeless 0   Total Score 0

## 2021-11-11 RX ORDER — SEMAGLUTIDE 1.34 MG/ML
0.5 INJECTION, SOLUTION SUBCUTANEOUS WEEKLY
Qty: 1.5 ML | Refills: 3 | Status: SHIPPED | OUTPATIENT
Start: 2021-11-11 | End: 2022-02-15 | Stop reason: DRUGHIGH

## 2021-11-12 RX ORDER — IBUPROFEN 800 MG/1
TABLET ORAL
Qty: 90 TABLET | Refills: 1 | Status: SHIPPED | OUTPATIENT
Start: 2021-11-12 | End: 2022-01-10

## 2021-11-15 RX ORDER — PREDNISONE 20 MG/1
TABLET ORAL
Qty: 10 TABLET | Refills: 0 | OUTPATIENT
Start: 2021-11-15

## 2021-11-18 ENCOUNTER — TELEPHONE (OUTPATIENT)
Dept: FAMILY MEDICINE CLINIC | Facility: CLINIC | Age: 61
End: 2021-11-18

## 2021-11-18 DIAGNOSIS — R10.2 PELVIC PAIN: ICD-10-CM

## 2021-11-18 DIAGNOSIS — F41.9 ANXIETY: ICD-10-CM

## 2021-11-18 DIAGNOSIS — G57.93 NEUROPATHY INVOLVING BOTH LOWER EXTREMITIES: ICD-10-CM
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
## 2021-11-18 NOTE — TELEPHONE ENCOUNTER
Spoke with patient she stated that she has pain and burning in both legs starting at he hips to her knees. She said that she did fall 3 weeks ago and the pain is from that she believes. Patient did have an XR of back then in the ER that was normal. She is requesting an MRI of hips and back. Does she need an appointment? She said that she is taking the Ibuprofen 800 MG and the Robaxin 500 MG but neither are helping.      No allergies  [facility]

## 2021-11-18 NOTE — TELEPHONE ENCOUNTER
Caller: Anita Estrada    Relationship: Self    Best call back number: [phone]    What is the best time to reach you: ANYTIME    Who are you requesting to speak with (clinical staff, provider,  specific staff member): CLINICAL      What was the call regarding: THE PATIENT WOULD LIKE A CALL BACK TO DISCUSS HER LEG PAIN WITH THE NURSE. SHE STATED HER LEGS ARE STILL HURTING IN HER THIGHS AND SHE WOULD LIKE PCP RECOMMENDATION    Do you require a callback: YES

## 2021-11-19 RX ORDER — CYANOCOBALAMIN 1000 UG/ML
INJECTION, SOLUTION INTRAMUSCULAR; SUBCUTANEOUS
Qty: 4 ML | Refills: 0 | Status: SHIPPED | OUTPATIENT
Start: 2021-11-19 | End: 2022-01-10

## 2021-11-19 RX ORDER — DIAZEPAM 5 MG/1
5 TABLET ORAL DAILY
Qty: 6 TABLET | Refills: 0 | Status: SHIPPED | OUTPATIENT
Start: 2021-11-19 | End: 2021-11-22

## 2021-11-19 NOTE — TELEPHONE ENCOUNTER
Pended MRI`s. Patient said that she is claustrophobic and will need medication to do the MRI`s. Patient was prescribed Valium 5 MG in the past for past procedures. Pended Valium from [facility]. Patient also wanted something stronger than the Ibuprofen 800 MG and the Robaxin. They are not helping.

## 2021-11-19 NOTE — TELEPHONE ENCOUNTER
Place order for MRI lumbar spine. Dx back pain, fall, and neuropathy lower extremities.   Also MRI pelvis. Dx pelvic pain, back pain, fall.  Both without contrast. Stat.  Let pt know the plan.

## 2021-11-22 ENCOUNTER — CLINICAL SUPPORT (OUTPATIENT)
Dept: FAMILY MEDICINE CLINIC | Facility: CLINIC | Age: 61
End: 2021-11-22

## 2021-11-22 DIAGNOSIS — E53.8 B12 DEFICIENCY: Primary | ICD-10-CM

## 2021-11-22 DIAGNOSIS — G89.4 CHRONIC PAIN SYNDROME: Primary | ICD-10-CM

## 2021-11-22 DIAGNOSIS — F41.9 ANXIETY: ICD-10-CM

## 2021-11-22 PROCEDURE — 96372 THER/PROPH/DIAG INJ SC/IM: CPT | Performed by: FAMILY MEDICINE

## 2021-11-22 RX ORDER — HYDROCODONE BITARTRATE AND ACETAMINOPHEN 5; 325 MG/1; MG/1
1 TABLET ORAL EVERY 6 HOURS PRN
Qty: 25 TABLET | Refills: 0 | Status: SHIPPED | OUTPATIENT
Start: 2021-11-22 | End: 2021-11-29

## 2021-11-22 RX ADMIN — CYANOCOBALAMIN 1000 MCG: 1000 INJECTION, SOLUTION INTRAMUSCULAR; SUBCUTANEOUS at 11:46

## 2021-11-22 NOTE — TELEPHONE ENCOUNTER
Patient is really wanting something for pain she stated that the 800 mg ibuprofen and robaxin isn't helping at all.

## 2021-11-22 NOTE — TELEPHONE ENCOUNTER
Please call and let pt know I sent in hydrocodone to her pharmacy to help with the pain.  Thanks.

## 2021-11-23 ENCOUNTER — TELEPHONE (OUTPATIENT)
Dept: FAMILY MEDICINE CLINIC | Facility: CLINIC | Age: 61
End: 2021-11-23

## 2021-11-23 RX ORDER — DIAZEPAM 5 MG/1
5 TABLET ORAL DAILY
Qty: 6 TABLET | Refills: 0 | Status: SHIPPED | OUTPATIENT
Start: 2021-11-23 | End: 2022-02-07

## 2021-11-23 NOTE — TELEPHONE ENCOUNTER
Patient is aware that Maureen is working on a PA for her that it's not as simple as a verbal phone call

## 2021-11-23 NOTE — TELEPHONE ENCOUNTER
Caller: Anita Estrada    Relationship: Self    Best call back number: [phone]    What is the best time to reach you: ANY    Who are you requesting to speak with (clinical staff, provider,  specific staff member): CLINICAL    What was the call regarding: PATIENT WAS TRYING TO GET HER HYDROcodone-acetaminophen (NORCO) 5-325 MG per tablet PRESCRIBED (NO ENCOUNTER FOUND) AND WAS TOLD THAT HER PCP NEEDS TO CALL [employer] AND VERBALLY CALL IN THE REASONING FOR PATIENT NEEDING MEDICATION. PLEASE CALL AND ADVISE. [employer] PHONE: [phone]    Do you require a callback: YES      [facility] - [place], [place] - [address] - [phone]  - [phone]   [phone]

## 2021-11-23 NOTE — TELEPHONE ENCOUNTER
Last visit:11/9/2021  Next visit:2/7/2022  UDS:11/1/2019    Last refill was sent in on 11/19/2021 with 6 tablets 0 refills

## 2021-11-24 DIAGNOSIS — M17.12 OSTEOARTHRITIS OF LEFT KNEE, UNSPECIFIED OSTEOARTHRITIS TYPE: ICD-10-CM

## 2021-11-26 DIAGNOSIS — G89.4 CHRONIC PAIN SYNDROME: ICD-10-CM

## 2021-11-29 RX ORDER — METHOCARBAMOL 500 MG/1
TABLET, FILM COATED ORAL
Qty: 30 TABLET | Refills: 1 | Status: SHIPPED | OUTPATIENT
Start: 2021-11-29 | End: 2021-12-16

## 2021-11-29 RX ORDER — HYDROCODONE BITARTRATE AND ACETAMINOPHEN 5; 325 MG/1; MG/1
1 TABLET ORAL EVERY 6 HOURS PRN
Qty: 25 TABLET | Refills: 0 | Status: SHIPPED | OUTPATIENT
Start: 2021-11-29 | End: 2022-01-26

## 2021-11-29 NOTE — TELEPHONE ENCOUNTER
Last visit:11/9/2021  Next visit:2/7/2022  UDS:11/1/2019    Sent [facility] message - needing updated UDS and consent     HYDROcodone-acetaminophen (NORCO) 5-325 MG per tablet - Last refilled on 11/22/2021   25 tablets with 1 refill

## 2021-12-09 ENCOUNTER — HOSPITAL ENCOUNTER (OUTPATIENT)
Dept: MRI IMAGING | Facility: HOSPITAL | Age: 61
Discharge: HOME | End: 2021-12-09
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
## 2021-12-09 DIAGNOSIS — G57.93 NEUROPATHY INVOLVING BOTH LOWER EXTREMITIES: ICD-10-CM

## 2021-12-09 DIAGNOSIS — R10.2 PELVIC PAIN: ICD-10-CM

## 2021-12-13 ENCOUNTER — TELEPHONE (OUTPATIENT)
Dept: FAMILY MEDICINE CLINIC | Facility: CLINIC | Age: 61
End: 2021-12-13

## 2021-12-13 DIAGNOSIS — F41.9 ANXIETY: Primary | ICD-10-CM

## 2021-12-13 NOTE — TELEPHONE ENCOUNTER
Caller: Sean Anita M    Relationship: Self    Best call back number: [phone]    What medications are you currently taking:   Current Outpatient Medications on File Prior to Visit   Medication Sig Dispense Refill   • ammonium lactate (LAC-HYDRIN) 12 % lotion APPLY TOPICALLY TO AFFECTED AREA TWO TIMES A DAY AS NEEDED 226 g 10   • cyanocobalamin 1000 MCG/ML injection INJECT 1 ML INTRAMUSCULARLY EVERY 2 WEEKS 4 mL 0   • diazePAM (VALIUM) 5 MG tablet TAKE 1 TABLET BY MOUTH DAILY 6 tablet 0   • Diclofenac Sodium (VOLTAREN) 1 % gel gel APPLY TO AFFECTED AREA FOUR TIMES A  g 0   • gabapentin (NEURONTIN) 800 MG tablet TAKE 1 TABLET BY MOUTH FOUR TIMES A DAY AS NEEDED FOR NEUROPATHY 120 tablet 1   • hydroCHLOROthiazide (HYDRODIURIL) 25 MG tablet Take 0.5 tablets by mouth Daily for 30 days. 15 tablet 5   • HYDROcodone-acetaminophen (NORCO) 5-325 MG per tablet TAKE 1 TABLET BY MOUTH EVERY 6 (SIX) HOURS AS NEEDED FOR MODERATE PAIN . 25 tablet 0   • ibuprofen (ADVIL,MOTRIN) 800 MG tablet TAKE 1 TABLET BY MOUTH THREE TIMES A DAY AS NEEDED 90 tablet 1   • lamoTRIgine (LaMICtal) 25 MG tablet Take 25 mg by mouth 2 (Two) Times a Day.     • losartan (COZAAR) 50 MG tablet TAKE 1 TABLET BY MOUTH EVERY DAY 30 tablet 6   • methocarbamol (ROBAXIN) 500 MG tablet TAKE 1 TABLET BY MOUTH THREE TIMES A DAY AS NEEDED FOR MUSCLE SPASMS 30 tablet 1   • predniSONE (DELTASONE) 20 MG tablet Take 2 tablets po daily 10 tablet 0   • Semaglutide,0.25 or 0.5MG/DOS, (Ozempic, 0.25 or 0.5 MG/DOSE,) 2 MG/1.5ML solution pen-injector Inject 0.5 mg under the skin into the appropriate area as directed 1 (One) Time Per Week. 1.5 mL 3   • vitamin D (ERGOCALCIFEROL) 1.25 MG (82911 UT) capsule capsule Take 1 capsule by mouth 1 (One) Time Per Week. (Patient taking differently: Take 50,000 Units by mouth 1 (One) Time Per Week. Taking twice a week) 12 capsule 1     Current Facility-Administered Medications on File Prior to Visit   Medication Dose Route  Frequency Provider Last Rate Last Admin   • cyanocobalamin injection 1,000 mcg  1,000 mcg Intramuscular Q28 Days Valentino Canales MD   1,000 mcg at 11/22/21 1146   • cyanocobalamin injection 1,000 mcg  1,000 mcg Intramuscular Q28 Days Valentino Canales MD   1,000 mcg at 11/09/21 1531          When did you start taking these medications: 12/09/2021    Which medication are you concerned about: diazePAM (VALIUM) 5 MG tablet    Who prescribed you this medication: VALENTINO CANALES    What are your concerns: THE PATIENT STATED THESE MEDICATIONS DID NOT WORK FOR HER AND SHE WAS UNABLE TO RECEIVE HER MRI. SHE WOULD LIKE A CALL BACK TO DISCUSS WITH NURSE THE DIRECTIONS OR A POSSIBLE ALTERNATE MEDICATION    How long have you had these concerns: 12/09/2021

## 2021-12-14 RX ORDER — ALPRAZOLAM 0.5 MG/1
0.5 TABLET ORAL 2 TIMES DAILY PRN
Qty: 2 TABLET | Refills: 0 | Status: SHIPPED | OUTPATIENT
Start: 2021-12-14 | End: 2022-02-07 | Stop reason: SDUPTHER

## 2021-12-16 RX ORDER — METHOCARBAMOL 500 MG/1
TABLET, FILM COATED ORAL
Qty: 30 TABLET | Refills: 1 | Status: SHIPPED | OUTPATIENT
Start: 2021-12-16 | End: 2022-01-24

## 2021-12-16 NOTE — TELEPHONE ENCOUNTER
[phone]  P[facility]    They request a referral to be faxed with information. They will then call and set up evaluation. Spoke with Janine Beltran , they can take him at any time. Referral pended. Please advise medication   Last visit:11/9/2021  Next visit:2/7/2022

## 2021-12-20 NOTE — TELEPHONE ENCOUNTER
Pt was told she needs to come in to update UDS in order to get hydrocodone approved through insurance. Pt states she will come in next week to leave a sample.

## 2021-12-21 DIAGNOSIS — M17.12 OSTEOARTHRITIS OF LEFT KNEE, UNSPECIFIED OSTEOARTHRITIS TYPE: ICD-10-CM

## 2021-12-22 DIAGNOSIS — M17.12 OSTEOARTHRITIS OF LEFT KNEE, UNSPECIFIED OSTEOARTHRITIS TYPE: ICD-10-CM

## 2022-01-03 ENCOUNTER — TELEPHONE (OUTPATIENT)
Dept: ORTHOPEDIC SURGERY | Facility: CLINIC | Age: 62
End: 2022-01-03

## 2022-01-05 ENCOUNTER — APPOINTMENT (OUTPATIENT)
Dept: MRI IMAGING | Facility: HOSPITAL | Age: 62
End: 2022-01-05

## 2022-01-08 DIAGNOSIS — G62.9 POLYNEUROPATHY: ICD-10-CM

## 2022-01-10 RX ORDER — GABAPENTIN 800 MG/1
TABLET ORAL
Qty: 120 TABLET | Refills: 1 | Status: SHIPPED | OUTPATIENT
Start: 2022-01-10 | End: 2022-02-04 | Stop reason: SDUPTHER

## 2022-01-10 RX ORDER — IBUPROFEN 800 MG/1
TABLET ORAL
Qty: 90 TABLET | Refills: 1 | Status: SHIPPED | OUTPATIENT
Start: 2022-01-10 | End: 2022-03-08

## 2022-01-10 RX ORDER — CYANOCOBALAMIN 1000 UG/ML
INJECTION, SOLUTION INTRAMUSCULAR; SUBCUTANEOUS
Qty: 2 ML | Refills: 0 | Status: SHIPPED | OUTPATIENT
Start: 2022-01-10 | End: 2022-02-04

## 2022-01-21 ENCOUNTER — APPOINTMENT (OUTPATIENT)
Dept: MRI IMAGING | Facility: HOSPITAL | Age: 62
End: 2022-01-21

## 2022-01-24 RX ORDER — METHOCARBAMOL 500 MG/1
TABLET, FILM COATED ORAL
Qty: 30 TABLET | Refills: 0 | Status: SHIPPED | OUTPATIENT
Start: 2022-01-24 | End: 2022-01-27 | Stop reason: SDUPTHER

## 2022-01-25 ENCOUNTER — CLINICAL SUPPORT (OUTPATIENT)
Dept: FAMILY MEDICINE CLINIC | Facility: CLINIC | Age: 62
End: 2022-01-25

## 2022-01-25 DIAGNOSIS — G89.4 CHRONIC PAIN SYNDROME: ICD-10-CM

## 2022-01-25 DIAGNOSIS — E53.8 B12 DEFICIENCY: Primary | ICD-10-CM

## 2022-01-25 DIAGNOSIS — M54.9 BACK PAIN, UNSPECIFIED BACK LOCATION, UNSPECIFIED BACK PAIN LATERALITY, UNSPECIFIED CHRONICITY: ICD-10-CM

## 2022-01-25 PROCEDURE — 80305 DRUG TEST PRSMV DIR OPT OBS: CPT | Performed by: FAMILY MEDICINE

## 2022-01-25 PROCEDURE — 96372 THER/PROPH/DIAG INJ SC/IM: CPT | Performed by: FAMILY MEDICINE

## 2022-01-25 RX ORDER — CYANOCOBALAMIN 1000 UG/ML
1000 INJECTION, SOLUTION INTRAMUSCULAR; SUBCUTANEOUS
Status: DISCONTINUED | OUTPATIENT
Start: 2022-01-25 | End: 2022-09-27

## 2022-01-25 RX ADMIN — CYANOCOBALAMIN 1000 MCG: 1000 INJECTION, SOLUTION INTRAMUSCULAR; SUBCUTANEOUS at 11:18

## 2022-01-26 RX ORDER — HYDROCODONE BITARTRATE AND ACETAMINOPHEN 5; 325 MG/1; MG/1
TABLET ORAL
Qty: 25 TABLET | Refills: 0 | Status: SHIPPED | OUTPATIENT
Start: 2022-01-26 | End: 2022-02-08

## 2022-01-27 DIAGNOSIS — M62.838 MUSCLE SPASM: Primary | ICD-10-CM

## 2022-01-28 RX ORDER — METHOCARBAMOL 500 MG/1
500 TABLET, FILM COATED ORAL 3 TIMES DAILY PRN
Qty: 30 TABLET | Refills: 0 | Status: SHIPPED | OUTPATIENT
Start: 2022-01-28 | End: 2022-02-04 | Stop reason: SDUPTHER

## 2022-02-04 ENCOUNTER — APPOINTMENT (OUTPATIENT)
Dept: MRI IMAGING | Facility: HOSPITAL | Age: 62
End: 2022-02-04

## 2022-02-04 DIAGNOSIS — G62.9 POLYNEUROPATHY: ICD-10-CM

## 2022-02-04 DIAGNOSIS — M62.838 MUSCLE SPASM: ICD-10-CM

## 2022-02-04 RX ORDER — METHOCARBAMOL 500 MG/1
500 TABLET, FILM COATED ORAL 3 TIMES DAILY PRN
Qty: 30 TABLET | Refills: 0 | Status: SHIPPED | OUTPATIENT
Start: 2022-02-04 | End: 2022-02-21

## 2022-02-04 RX ORDER — CYANOCOBALAMIN 1000 UG/ML
INJECTION, SOLUTION INTRAMUSCULAR; SUBCUTANEOUS
Qty: 2 ML | Refills: 0 | Status: SHIPPED | OUTPATIENT
Start: 2022-02-04 | End: 2022-03-02

## 2022-02-04 RX ORDER — LOSARTAN POTASSIUM 50 MG/1
TABLET ORAL
Qty: 30 TABLET | Refills: 6 | Status: SHIPPED | OUTPATIENT
Start: 2022-02-04 | End: 2022-10-13 | Stop reason: SDUPTHER

## 2022-02-04 RX ORDER — GABAPENTIN 800 MG/1
800 TABLET ORAL 4 TIMES DAILY PRN
Qty: 120 TABLET | Refills: 1 | Status: SHIPPED | OUTPATIENT
Start: 2022-02-04 | End: 2022-10-13 | Stop reason: SDUPTHER

## 2022-02-07 ENCOUNTER — OFFICE VISIT (OUTPATIENT)
Dept: FAMILY MEDICINE CLINIC | Facility: CLINIC | Age: 62
End: 2022-02-07

## 2022-02-07 VITALS
HEIGHT: 64 IN | TEMPERATURE: 97.8 F | DIASTOLIC BLOOD PRESSURE: 79 MMHG | HEART RATE: 82 BPM | OXYGEN SATURATION: 97 % | SYSTOLIC BLOOD PRESSURE: 131 MMHG | WEIGHT: 293 LBS | BODY MASS INDEX: 50.02 KG/M2 | RESPIRATION RATE: 22 BRPM

## 2022-02-07 DIAGNOSIS — F41.9 ANXIETY: ICD-10-CM

## 2022-02-07 DIAGNOSIS — R53.83 FATIGUE, UNSPECIFIED TYPE: ICD-10-CM

## 2022-02-07 DIAGNOSIS — G89.4 CHRONIC PAIN SYNDROME: ICD-10-CM

## 2022-02-07 DIAGNOSIS — R73.09 ELEVATED GLUCOSE: ICD-10-CM

## 2022-02-07 DIAGNOSIS — E55.9 VITAMIN D DEFICIENCY: Primary | ICD-10-CM

## 2022-02-07 DIAGNOSIS — E11.65 TYPE 2 DIABETES MELLITUS WITH HYPERGLYCEMIA, WITHOUT LONG-TERM CURRENT USE OF INSULIN: ICD-10-CM

## 2022-02-07 DIAGNOSIS — I10 ESSENTIAL (PRIMARY) HYPERTENSION: ICD-10-CM

## 2022-02-07 PROCEDURE — 99214 OFFICE O/P EST MOD 30 MIN: CPT | Performed by: FAMILY MEDICINE

## 2022-02-07 PROCEDURE — 96372 THER/PROPH/DIAG INJ SC/IM: CPT | Performed by: FAMILY MEDICINE

## 2022-02-07 RX ORDER — SEMAGLUTIDE 1.34 MG/ML
0.5 INJECTION, SOLUTION SUBCUTANEOUS WEEKLY
Qty: 1.5 ML | Refills: 3 | Status: CANCELLED | OUTPATIENT
Start: 2022-02-07

## 2022-02-07 RX ORDER — ALPRAZOLAM 0.5 MG/1
0.5 TABLET ORAL 2 TIMES DAILY PRN
Qty: 2 TABLET | Refills: 0 | Status: SHIPPED | OUTPATIENT
Start: 2022-02-07 | End: 2022-04-07

## 2022-02-07 RX ADMIN — CYANOCOBALAMIN 1000 MCG: 1000 INJECTION, SOLUTION INTRAMUSCULAR; SUBCUTANEOUS at 08:36

## 2022-02-07 NOTE — PROGRESS NOTES
"  Chief Complaint   Patient presents with   • Hypertension   • Back Pain     Subjective   Anita Estrada is a 61 y.o. female who presents to the office for follow-up.      Pt presents for f/u.    Hx o DM type 2. Needs the ozempic refilled. She has [facility]ita out of the ozempic. She needs to have the med prior authorized.     She is due for labs.     Hx of HTN. Controlled.                The following portions of the patient's history were reviewed and updated as appropriate: allergies, current medications, past family history, past medical history, past social history, past surgical history and problem list.    Review of Systems   Constitutional: Negative for chills, fever and unexpected weight loss.   Respiratory: Negative for cough, chest tightness and shortness of breath.    Cardiovascular: Negative for chest pain and palpitations.   Gastrointestinal: Negative for abdominal pain, constipation, diarrhea, nausea and vomiting.        Objective   Vitals:    02/07/22 0757   BP: 131/79   Pulse: 82   Resp: 22   Temp: 97.8 °F (36.6 °C)   SpO2: 97%   Weight: (!) 159 kg (350 lb 12.8 oz)   Height: 162.6 cm (64\")     Body mass index is 60.21 kg/m².  Physical Exam  Vitals reviewed.   Constitutional:       General: She is not in acute distress.     Appearance: Normal appearance. She is not ill-appearing.   HENT:      Head: Normocephalic.   Eyes:      Conjunctiva/sclera: Conjunctivae normal.   Cardiovascular:      Rate and Rhythm: Normal rate and regular rhythm.   Pulmonary:      Effort: Pulmonary effort is normal.      Breath sounds: Normal breath sounds.   Skin:     Findings: No lesion or rash.   Neurological:      Mental Status: She is alert and oriented to person, place, and time.   Psychiatric:         Mood and Affect: Mood normal.          Assessment/Plan   Diagnoses and all orders for this visit:    1. Vitamin D deficiency (Primary)  -     Vitamin D 25 Hydroxy; Future    2. Type 2 diabetes mellitus with hyperglycemia, without " long-term current use of insulin (HCC)  Comments:  ozempic refilled.    3. Essential (primary) hypertension  Comments:  controlled.  Orders:  -     CBC Auto Differential; Future  -     Comprehensive Metabolic Panel; Future  -     Lipid Panel; Future  -     TSH; Future  -     T4, Free; Future  -     Microalbumin / Creatinine Urine Ratio - Urine, Clean Catch; Future  -     Hemoglobin A1c; Future    4. Elevated glucose  -     Hemoglobin A1c; Future    5. Anxiety  Comments:  controlled.  Orders:  -     ALPRAZolam (Xanax) 0.5 MG tablet; Take 1 tablet by mouth 2 (Two) Times a Day As Needed for Anxiety.  Dispense: 2 tablet; Refill: 0    6. Fatigue, unspecified type  -     Vitamin B12 & Folate; Future    Other orders  -     Semaglutide,0.25 or 0.5MG/DOS, (OZEMPIC) 2 MG/1.5ML solution pen-injector; Inject 0.5 mg under the skin into the appropriate area as directed 1 (One) Time Per Week.  Dispense: 2 pen; Refill: 3               No follow-ups on file.   PHQ-2/PHQ-9 Depression Screening 2/7/2022   Little interest or pleasure in doing things 0   Feeling down, depressed, or hopeless 0   Total Score 0

## 2022-02-08 RX ORDER — HYDROCODONE BITARTRATE AND ACETAMINOPHEN 5; 325 MG/1; MG/1
TABLET ORAL
Qty: 25 TABLET | Refills: 0 | Status: SHIPPED | OUTPATIENT
Start: 2022-02-08 | End: 2022-03-05

## 2022-02-09 ENCOUNTER — LAB (OUTPATIENT)
Dept: LAB | Facility: HOSPITAL | Age: 62
End: 2022-02-09

## 2022-02-09 DIAGNOSIS — I10 ESSENTIAL (PRIMARY) HYPERTENSION: ICD-10-CM

## 2022-02-09 DIAGNOSIS — E55.9 VITAMIN D DEFICIENCY: ICD-10-CM

## 2022-02-09 DIAGNOSIS — R73.09 ELEVATED GLUCOSE: ICD-10-CM

## 2022-02-09 DIAGNOSIS — R53.83 FATIGUE, UNSPECIFIED TYPE: ICD-10-CM

## 2022-02-09 LAB
25(OH)D3 SERPL-MCNC: 23.1 NG/ML
ALBUMIN SERPL-MCNC: 3.8 G/DL (ref 3.5–5.2)
ALBUMIN UR-MCNC: <1.2 MG/DL
ALBUMIN/GLOB SERPL: 1.2 G/DL
ALP SERPL-CCNC: 58 U/L (ref 39–117)
ALT SERPL W P-5'-P-CCNC: 11 U/L (ref 1–33)
ANION GAP SERPL CALCULATED.3IONS-SCNC: 8.7 MMOL/L (ref 5–15)
AST SERPL-CCNC: 10 U/L (ref 1–32)
BASOPHILS # BLD AUTO: 0.04 10*3/MM3 (ref 0–0.2)
BASOPHILS NFR BLD AUTO: 0.7 % (ref 0–1.5)
BILIRUB SERPL-MCNC: 0.2 MG/DL (ref 0–1.2)
BUN SERPL-MCNC: 16 MG/DL (ref 8–23)
BUN/CREAT SERPL: 15.4 (ref 7–25)
CALCIUM SPEC-SCNC: 8.9 MG/DL (ref 8.6–10.5)
CHLORIDE SERPL-SCNC: 105 MMOL/L (ref 98–107)
CHOLEST SERPL-MCNC: 180 MG/DL (ref 0–200)
CO2 SERPL-SCNC: 28.3 MMOL/L (ref 22–29)
CREAT SERPL-MCNC: 1.04 MG/DL (ref 0.57–1)
CREAT UR-MCNC: 179.3 MG/DL
DEPRECATED RDW RBC AUTO: 39.4 FL (ref 37–54)
EOSINOPHIL # BLD AUTO: 0.07 10*3/MM3 (ref 0–0.4)
EOSINOPHIL NFR BLD AUTO: 1.2 % (ref 0.3–6.2)
ERYTHROCYTE [DISTWIDTH] IN BLOOD BY AUTOMATED COUNT: 12.3 % (ref 12.3–15.4)
FOLATE SERPL-MCNC: 4.45 NG/ML (ref 4.78–24.2)
GFR SERPL CREATININE-BSD FRML MDRD: 65 ML/MIN/1.73
GLOBULIN UR ELPH-MCNC: 3.2 GM/DL
GLUCOSE SERPL-MCNC: 84 MG/DL (ref 65–99)
HBA1C MFR BLD: 5.8 % (ref 4.8–5.6)
HCT VFR BLD AUTO: 37.2 % (ref 34–46.6)
HDLC SERPL-MCNC: 89 MG/DL (ref 40–60)
HGB BLD-MCNC: 12.4 G/DL (ref 12–15.9)
IMM GRANULOCYTES # BLD AUTO: 0.01 10*3/MM3 (ref 0–0.05)
IMM GRANULOCYTES NFR BLD AUTO: 0.2 % (ref 0–0.5)
LDLC SERPL CALC-MCNC: 76 MG/DL (ref 0–100)
LDLC/HDLC SERPL: 0.84 {RATIO}
LYMPHOCYTES # BLD AUTO: 2.22 10*3/MM3 (ref 0.7–3.1)
LYMPHOCYTES NFR BLD AUTO: 39.5 % (ref 19.6–45.3)
MCH RBC QN AUTO: 29.5 PG (ref 26.6–33)
MCHC RBC AUTO-ENTMCNC: 33.3 G/DL (ref 31.5–35.7)
MCV RBC AUTO: 88.4 FL (ref 79–97)
MICROALBUMIN/CREAT UR: NORMAL MG/G{CREAT}
MONOCYTES # BLD AUTO: 0.49 10*3/MM3 (ref 0.1–0.9)
MONOCYTES NFR BLD AUTO: 8.7 % (ref 5–12)
NEUTROPHILS NFR BLD AUTO: 2.79 10*3/MM3 (ref 1.7–7)
NEUTROPHILS NFR BLD AUTO: 49.7 % (ref 42.7–76)
NRBC BLD AUTO-RTO: 0 /100 WBC (ref 0–0.2)
PLATELET # BLD AUTO: 231 10*3/MM3 (ref 140–450)
PMV BLD AUTO: 11.7 FL (ref 6–12)
POTASSIUM SERPL-SCNC: 4.3 MMOL/L (ref 3.5–5.2)
PROT SERPL-MCNC: 7 G/DL (ref 6–8.5)
RBC # BLD AUTO: 4.21 10*6/MM3 (ref 3.77–5.28)
SODIUM SERPL-SCNC: 142 MMOL/L (ref 136–145)
T4 FREE SERPL-MCNC: 1.14 NG/DL (ref 0.93–1.7)
TRIGL SERPL-MCNC: 81 MG/DL (ref 0–150)
TSH SERPL DL<=0.05 MIU/L-ACNC: 2.69 UIU/ML (ref 0.27–4.2)
VIT B12 BLD-MCNC: >2000 PG/ML (ref 211–946)
VLDLC SERPL-MCNC: 15 MG/DL (ref 5–40)
WBC NRBC COR # BLD: 5.62 10*3/MM3 (ref 3.4–10.8)

## 2022-02-09 PROCEDURE — 80053 COMPREHEN METABOLIC PANEL: CPT

## 2022-02-09 PROCEDURE — 82306 VITAMIN D 25 HYDROXY: CPT

## 2022-02-09 PROCEDURE — 36415 COLL VENOUS BLD VENIPUNCTURE: CPT

## 2022-02-09 PROCEDURE — 82043 UR ALBUMIN QUANTITATIVE: CPT

## 2022-02-09 PROCEDURE — 83036 HEMOGLOBIN GLYCOSYLATED A1C: CPT

## 2022-02-09 PROCEDURE — 82570 ASSAY OF URINE CREATININE: CPT

## 2022-02-09 PROCEDURE — 84439 ASSAY OF FREE THYROXINE: CPT

## 2022-02-09 PROCEDURE — 84443 ASSAY THYROID STIM HORMONE: CPT

## 2022-02-09 PROCEDURE — 80061 LIPID PANEL: CPT

## 2022-02-09 PROCEDURE — 82607 VITAMIN B-12: CPT

## 2022-02-09 PROCEDURE — 82746 ASSAY OF FOLIC ACID SERUM: CPT

## 2022-02-09 PROCEDURE — 85025 COMPLETE CBC W/AUTO DIFF WBC: CPT

## 2022-02-15 ENCOUNTER — OFFICE VISIT (OUTPATIENT)
Dept: PODIATRY | Facility: CLINIC | Age: 62
End: 2022-02-15

## 2022-02-15 VITALS
HEIGHT: 64 IN | DIASTOLIC BLOOD PRESSURE: 81 MMHG | SYSTOLIC BLOOD PRESSURE: 152 MMHG | TEMPERATURE: 96.9 F | WEIGHT: 293 LBS | HEART RATE: 69 BPM | OXYGEN SATURATION: 95 % | BODY MASS INDEX: 50.02 KG/M2

## 2022-02-15 DIAGNOSIS — B35.1 ONYCHOMYCOSIS: ICD-10-CM

## 2022-02-15 DIAGNOSIS — M79.672 FOOT PAIN, BILATERAL: Primary | ICD-10-CM

## 2022-02-15 DIAGNOSIS — L60.0 ONYCHOCRYPTOSIS: ICD-10-CM

## 2022-02-15 DIAGNOSIS — M79.671 FOOT PAIN, BILATERAL: Primary | ICD-10-CM

## 2022-02-15 PROCEDURE — 11721 DEBRIDE NAIL 6 OR MORE: CPT | Performed by: PODIATRIST

## 2022-02-15 NOTE — PROGRESS NOTES
[facility]    Today's Date: 02/15/22    Patient Name: Anita Estrada  MRN: [id number]  CSN: [id number]  PCP: Valentino Rizo MD, Last PCP Visit: 7 February 2022  Referring Provider: No ref. provider found    SUBJECTIVE     Chief Complaint   Patient presents with   • Left Foot - Nail Problem   • Right Foot - Nail Problem     HPI: Anita Estrada, a 61 y.o.female, comes to clinic.    New, Established, New Problem:  established   Location:  Toenails  Duration:   Greater than five years  Onset:  Gradual  Nature:  sore with palpation.  Stable, worsening, improving:   Worsening  Aggravating factors:  Pain with shoe gear and ambulation.  Previous Treatment:  debridement    Patient reports the following medical changes since their last visit: None.    No other pedal complaints at this time.    Patient denies any fevers, chills, nausea, vomiting, shortness of breath, nor any other constitutional signs nor symptoms.       Past Medical History:   Diagnosis Date   • Acid reflux    • Arthritis    • Asthma    • Carpal tunnel syndrome    • Claustrophobia    • Eczema    • Essential hypertension 03/02/2015   • Foot pain, bilateral    • Hypertension    • Ingrowing nail 08/08/2018   • Limb swelling    • Lumbar back pain    • Migraine headache    • Obesity    • Plantar fascial fibromatosis of right foot 10/11/2018   • Pressure ulcer, stage 1    • Sleep apnea with use of continuous positive airway pressure (CPAP)    • Tinea unguium      Past Surgical History:   Procedure Laterality Date   • BREAST LUMPECTOMY Right 2011   • CARPAL TUNNEL RELEASE Right 03/06/2015, 9/15/17   • DILATATION AND CURETTAGE  1976   • HYSTERECTOMY  1983   • INTRAOCULAR LENS INSERTION       Family History   Problem Relation Age of Onset   • Arthritis Mother         Family history of certain chronic disabling diseases   • Osteoporosis Mother    • Cancer Father         unspecified   • Stroke Other         not specified   • Diabetes Other          unspecified type     Social History     Socioeconomic History   • Marital status:    Tobacco Use   • Smoking status: Current Some Day Smoker     Types: Cigarettes   • Smokeless tobacco: Never Used   • Tobacco comment: smoked 21-30 years now 1-2 cigs a week   Substance and Sexual Activity   • Alcohol use: Yes     Comment: 3 - 4 drinkson Wed and Thursdays , has been drinking for 31 or more years   • Drug use: Never   • Sexual activity: Defer     No Known Allergies  Current Outpatient Medications   Medication Sig Dispense Refill   • ALPRAZolam (Xanax) 0.5 MG tablet Take 1 tablet by mouth 2 (Two) Times a Day As Needed for Anxiety. 2 tablet 0   • ammonium lactate (LAC-HYDRIN) 12 % lotion APPLY TOPICALLY TO AFFECTED AREA TWO TIMES A DAY AS NEEDED 226 g 10   • cyanocobalamin 1000 MCG/ML injection INJECT 1 ML INTRAMUSCULARLY EVERY 2 WEEKS 2 mL 0   • Diclofenac Sodium (VOLTAREN) 1 % gel gel Apply  topically to the appropriate area as directed 4 (Four) Times a Day. 100 g 3   • gabapentin (NEURONTIN) 800 MG tablet Take 1 tablet by mouth 4 (Four) Times a Day As Needed (neuropathy). 120 tablet 1   • hydroCHLOROthiazide (HYDRODIURIL) 25 MG tablet Take 0.5 tablets by mouth Daily for 30 days. 15 tablet 5   • HYDROcodone-acetaminophen (NORCO) 5-325 MG per tablet TAKE 1 TABLET BY MOUTH EVERY 6 HOURS AS NEEDED FOR MODERATE PAIN 25 tablet 0   • ibuprofen (ADVIL,MOTRIN) 800 MG tablet TAKE 1 TABLET BY MOUTH THREE TIMES A DAY AS NEEDED 90 tablet 1   • lamoTRIgine (LaMICtal) 25 MG tablet Take 25 mg by mouth 2 (Two) Times a Day.     • losartan (COZAAR) 50 MG tablet TAKE 1 TABLET BY MOUTH EVERY DAY 30 tablet 6   • methocarbamol (ROBAXIN) 500 MG tablet Take 1 tablet by mouth 3 (Three) Times a Day As Needed for Muscle Spasms. 30 tablet 0   • Semaglutide,0.25 or 0.5MG/DOS, (OZEMPIC) 2 MG/1.5ML solution pen-injector Inject 0.5 mg under the skin into the appropriate area as directed 1 (One) Time Per Week. 2 pen 3   • vitamin D  (ERGOCALCIFEROL) 1.25 MG (76073 UT) capsule capsule Take 1 capsule by mouth 1 (One) Time Per Week. (Patient taking differently: Take 50,000 Units by mouth 1 (One) Time Per Week. Taking twice a week) 12 capsule 1     Current Facility-Administered Medications   Medication Dose Route Frequency Provider Last Rate Last Admin   • cyanocobalamin injection 1,000 mcg  1,000 mcg Intramuscular Q28 Days Valentino Rizo MD   1,000 mcg at 11/22/21 1146   • cyanocobalamin injection 1,000 mcg  1,000 mcg Intramuscular Q28 Days Valentino Rizo MD   1,000 mcg at 02/07/22 0836   • cyanocobalamin injection 1,000 mcg  1,000 mcg Intramuscular Q28 Days Valentino Rizo MD   1,000 mcg at 01/25/22 1118     Review of Systems   Constitutional: Negative.    Skin:        Painful toenails.   All other systems reviewed and are negative.      OBJECTIVE     Vitals:    02/15/22 1434   BP: 152/81   Pulse: 69   Temp: 96.9 °F (36.1 °C)   SpO2: 95%       Patient seen in no apparent distress.      PHYSICAL EXAM:     Foot/Ankle Exam:       General:   Appearance: obesity    Orientation: AAOx3    Affect: appropriate    Gait: unimpaired    Shoe Gear:  Casual shoes    VASCULAR      Right Foot Vascularity   Normal vascular exam    Dorsalis pedis:  2+  Posterior tibial:  2+  Skin Temperature: warm    Edema Grading:  None  CFT:  < 3 seconds  Pedal Hair Growth:  Present  Varicosities: none       Left Foot Vascularity   Normal vascular exam    Dorsalis pedis:  2+  Posterior tibial:  2+  Skin Temperature: warm    Edema Grading:  None  CFT:  < 3 seconds  Pedal Hair Growth:  Present  Varicosities: none        NEUROLOGIC     Right Foot Neurologic   Normal sensation    Light touch sensation:  Normal  Vibratory sensation:  Normal  Hot/Cold sensation: normal    Protective Sensation using Perry-Bryanna Monofilament:  10     Left Foot Neurologic   Normal sensation    Light touch sensation:  Normal  Vibratory sensation:  Normal  Hot/cold sensation: normal    Protective  Sensation using [place]-Bryanna Monofilament:  10     MUSCLE STRENGTH     Right Foot Muscle Strength   Normal strength    Foot dorsiflexion:  5  Foot plantar flexion:  5  Foot inversion:  5  Foot eversion:  5     Left Foot Muscle Strength   Normal strength    Foot dorsiflexion:  5  Foot plantar flexion:  5  Foot inversion:  5  Foot eversion:  5     RANGE OF MOTION      Right Foot Range of Motion   Foot and ankle ROM within normal limits       Left Foot Range of Motion   Foot and ankle ROM within normal limits       DERMATOLOGIC     Right Foot Dermatologic   Skin: skin intact    Nails: onychomycosis, abnormally thick, subungual debris, dystrophic nails and ingrown toenail    Nails comment:  Toenails 1, 2, 3, 4     Left Foot Dermatologic   Skin: skin intact    Nails: onychomycosis, abnormally thick, subungual debris, dystrophic nails and ingrown toenail    Nails comment:  Toenails 1, 2, 3, 4, and 5      ASSESSMENT/PLAN     Diagnoses and all orders for this visit:    1. Foot pain, bilateral (Primary)    2. Onychomycosis    3. Onychocryptosis        Comprehensive lower extremity examination and evaluation was performed.    Discussed findings and treatment plan including risks, benefits, and treatment options with patient in detail. Patient agreed with treatment plan.    Toenails 1 through 5 on left and toenails 1 through 4 on right were debrided in thickness and length and then smoothed with a Dremel Tool.  Tolerated the procedure well without complications.    An After Visit Summary was printed and given to the patient at discharge, including (if requested) any available informative/educational handouts regarding diagnosis, treatment, or medications. All questions were answered to patient/family satisfaction. Should symptoms fail to improve or worsen they agree to call or return to clinic or to go to the Emergency Department. Discussed the importance of following up with any needed screening tests/labs/specialist  appointments and any requested follow-up recommended by me today. Importance of maintaining follow-up discussed and patient accepts that missed appointments can delay diagnosis and potentially lead to worsening of conditions.    Return in about 9 weeks (around 4/19/2022) for Toenail Care., or sooner if acute issues arise.    This document has been electronically signed by Ranulfo Huizar DPM on February 15, 2022 14:42 EST

## 2022-02-21 DIAGNOSIS — M62.838 MUSCLE SPASM: ICD-10-CM

## 2022-02-21 RX ORDER — METHOCARBAMOL 500 MG/1
TABLET, FILM COATED ORAL
Qty: 30 TABLET | Refills: 0 | Status: SHIPPED | OUTPATIENT
Start: 2022-02-21 | End: 2022-03-01

## 2022-02-22 ENCOUNTER — HOSPITAL ENCOUNTER (OUTPATIENT)
Dept: MRI IMAGING | Facility: HOSPITAL | Age: 62
Discharge: HOME | End: 2022-02-22

## 2022-02-22 PROCEDURE — 72195 MRI PELVIS W/O DYE: CPT

## 2022-02-22 PROCEDURE — 72148 MRI LUMBAR SPINE W/O DYE: CPT

## 2022-02-24 ENCOUNTER — TELEPHONE (OUTPATIENT)
Dept: FAMILY MEDICINE CLINIC | Facility: CLINIC | Age: 62
End: 2022-02-24

## 2022-02-24 DIAGNOSIS — M54.50 CHRONIC LOW BACK PAIN, UNSPECIFIED BACK PAIN LATERALITY, UNSPECIFIED WHETHER SCIATICA PRESENT: Primary | ICD-10-CM

## 2022-02-24 DIAGNOSIS — G89.29 CHRONIC LOW BACK PAIN, UNSPECIFIED BACK PAIN LATERALITY, UNSPECIFIED WHETHER SCIATICA PRESENT: Primary | ICD-10-CM

## 2022-02-24 NOTE — TELEPHONE ENCOUNTER
----- Message from Valentino Rizo MD sent at 2/24/2022  6:07 AM EST -----  Please call and let pt know that the MRI of the hips were normal. So it is not the hips, it is the back Iumbar spine. Tell pt I would recommend she getting back in with pain management if she isnt already. Get her an appt with pain management. Dx chronic back pain. Thanks.

## 2022-02-28 ENCOUNTER — CLINICAL SUPPORT (OUTPATIENT)
Dept: FAMILY MEDICINE CLINIC | Facility: CLINIC | Age: 62
End: 2022-02-28

## 2022-02-28 DIAGNOSIS — M62.838 MUSCLE SPASM: ICD-10-CM

## 2022-02-28 DIAGNOSIS — E53.8 B12 DEFICIENCY: ICD-10-CM

## 2022-02-28 PROCEDURE — 96372 THER/PROPH/DIAG INJ SC/IM: CPT | Performed by: FAMILY MEDICINE

## 2022-02-28 RX ADMIN — CYANOCOBALAMIN 1000 MCG: 1000 INJECTION, SOLUTION INTRAMUSCULAR; SUBCUTANEOUS at 11:27

## 2022-03-01 RX ORDER — METHOCARBAMOL 500 MG/1
TABLET, FILM COATED ORAL
Qty: 30 TABLET | Refills: 0 | Status: SHIPPED | OUTPATIENT
Start: 2022-03-01 | End: 2022-06-09 | Stop reason: SDUPTHER

## 2022-03-02 RX ORDER — CYANOCOBALAMIN 1000 UG/ML
INJECTION, SOLUTION INTRAMUSCULAR; SUBCUTANEOUS
Qty: 2 ML | Refills: 0 | Status: SHIPPED | OUTPATIENT
Start: 2022-03-02 | End: 2022-03-28

## 2022-03-04 DIAGNOSIS — G89.4 CHRONIC PAIN SYNDROME: ICD-10-CM

## 2022-03-05 RX ORDER — HYDROCODONE BITARTRATE AND ACETAMINOPHEN 5; 325 MG/1; MG/1
TABLET ORAL
Qty: 25 TABLET | Refills: 0 | Status: SHIPPED | OUTPATIENT
Start: 2022-03-05 | End: 2022-04-07 | Stop reason: SDUPTHER

## 2022-03-08 ENCOUNTER — TELEPHONE (OUTPATIENT)
Dept: FAMILY MEDICINE CLINIC | Facility: CLINIC | Age: 62
End: 2022-03-08

## 2022-03-08 RX ORDER — IBUPROFEN 800 MG/1
TABLET ORAL
Qty: 90 TABLET | Refills: 0 | Status: SHIPPED | OUTPATIENT
Start: 2022-03-08 | End: 2022-11-28

## 2022-03-08 NOTE — TELEPHONE ENCOUNTER
Caller: Anita Estrada    Relationship: Self    Best call back number: [phone]    What is the best time to reach you: ANY    Who are you requesting to speak with (clinical staff, provider,  specific staff member): CLINICAL STAFF    What was the call regarding: PATIENT CALLED STATING THAT SHE NEEDS A PRIOR AUTHORIZATION FOR HER HYDROcodone-acetaminophen (NORCO) 5-325 MG per tablet    Do you require a callback: YES

## 2022-03-09 DIAGNOSIS — G89.4 CHRONIC PAIN SYNDROME: ICD-10-CM

## 2022-03-09 RX ORDER — HYDROCODONE BITARTRATE AND ACETAMINOPHEN 5; 325 MG/1; MG/1
TABLET ORAL
Qty: 25 TABLET | Refills: 0 | OUTPATIENT
Start: 2022-03-09

## 2022-03-14 ENCOUNTER — TRANSCRIBE ORDERS (OUTPATIENT)
Dept: ADMINISTRATIVE | Facility: HOSPITAL | Age: 62
End: 2022-03-14

## 2022-03-14 DIAGNOSIS — Z12.31 VISIT FOR SCREENING MAMMOGRAM: Primary | ICD-10-CM

## 2022-03-18 ENCOUNTER — TELEPHONE (OUTPATIENT)
Dept: ORTHOPEDIC SURGERY | Facility: CLINIC | Age: 62
End: 2022-03-18

## 2022-03-18 NOTE — TELEPHONE ENCOUNTER
Patient is wanting to get left knee gel injections, she is needing approval from [employer] insurance.

## 2022-03-21 NOTE — TELEPHONE ENCOUNTER
[facility] approved synvisc one injection for right knee from 03/18/2022 to 03/18/2023. Auth number is [id number]

## 2022-03-23 ENCOUNTER — TELEPHONE (OUTPATIENT)
Dept: FAMILY MEDICINE CLINIC | Facility: CLINIC | Age: 62
End: 2022-03-23

## 2022-03-23 NOTE — TELEPHONE ENCOUNTER
Caller: Anita Estrada    Relationship to patient: Self    Best call back number: [phone]    Patient is needing:PATIENT CALLED IN BECAUSE SHE HAD AN OPTOMETRY APPOINTMENT AND WAS TOLD HER CHOLESTEROL WAS HIGH. SHE WANTED TO LET YOU KNOW IN CASE YOU WANTED TO ORDER LABS. PLEASE CALL PATIENT BACK AND ADVISE.

## 2022-03-24 ENCOUNTER — OFFICE VISIT (OUTPATIENT)
Dept: ORTHOPEDIC SURGERY | Facility: CLINIC | Age: 62
End: 2022-03-24

## 2022-03-24 VITALS — WEIGHT: 293 LBS | OXYGEN SATURATION: 97 % | HEART RATE: 75 BPM | HEIGHT: 64 IN | BODY MASS INDEX: 50.02 KG/M2

## 2022-03-24 DIAGNOSIS — M17.12 PRIMARY LOCALIZED OSTEOARTHROSIS OF LEFT LOWER LEG: Primary | ICD-10-CM

## 2022-03-24 PROCEDURE — 20610 DRAIN/INJ JOINT/BURSA W/O US: CPT | Performed by: PHYSICIAN ASSISTANT

## 2022-03-24 NOTE — PATIENT INSTRUCTIONS
Patient presented today for left knee Synvisc injection, risks and benefits were discussed and she tolerated this well.  We will plan to follow-up in 6 months for recheck or sooner for new or worsening symptoms

## 2022-03-24 NOTE — PROGRESS NOTES
"Chief Complaint  Follow-up and Pain of the Left Knee    Subjective          Anita Estrada presents to [facility] for follow-up on left knee pain, history of left knee osteoarthritis.  She has been treating pain conservatively with Synvisc injections, last injection 9/14/2021.  She states it lasted up until recently.  She uses Voltaren gel and takes ibuprofen as needed for pain relief.  Denies any new injuries or medications.    Objective   No Known Allergies    Vital Signs:   Pulse 75   Ht 162.6 cm (64\")   Wt (!) 163 kg (359 lb)   SpO2 97%   BMI 61.62 kg/m²       Physical Exam  Constitutional:       Appearance: Normal appearance. Patient is well-developed and normal weight.   HENT:      Head: Normocephalic.      Right Ear: Hearing and external ear normal.      Left Ear: Hearing and external ear normal.      Nose: Nose normal.   Eyes:      Conjunctiva/sclera: Conjunctivae normal.   Cardiovascular:      Rate and Rhythm: Normal rate.   Pulmonary:      Effort: Pulmonary effort is normal.      Breath sounds: No wheezing or rales.   Abdominal:      Palpations: Abdomen is soft.      Tenderness: There is no abdominal tenderness.   Musculoskeletal:      Cervical back: Normal range of motion.   Skin:     Findings: No rash.   Neurological:      Mental Status: Patient is alert and oriented to person, place, and time.   Psychiatric:         Mood and Affect: Mood and affect normal.         Judgment: Judgment normal.     Ortho Exam  Left knee: Skin intact, moderate swelling, tenderness along the joint line, crepitus with range of motion, range of motion 0 to 120 degrees, gait antalgic, sensation light touch intact in posterior tib pulses 2+, good range of motion left ankle and digits  Result Review :            Imaging Results (Most Recent)     None         Large Joint Arthrocentesis: L knee  Date/Time: 3/24/2022 1:25 PM  Consent given by: patient  Site marked: site marked  Timeout: Immediately " prior to procedure a time out was called to verify the correct patient, procedure, equipment, support staff and site/side marked as required   Supporting Documentation  Indications: pain   Procedure Details  Location: knee - L knee  Needle gauge: 21g.  Medications administered: 48 mg hylan 48 MG/6ML  Patient tolerance: patient tolerated the procedure well with no immediate complications            Assessment and Plan    Problem List Items Addressed This Visit        Musculoskeletal and Injuries    Primary localized osteoarthrosis of left lower leg - Primary    Current Assessment & Plan     Patient presented today for left knee Synvisc injection, risks and benefits were discussed and she tolerated this well.  We will plan to follow-up in 6 months for recheck or sooner for new or worsening symptoms                 Follow Up   Return in about 6 months (around 9/24/2022) for After gel injection approval.  Patient Instructions   Patient presented today for left knee Synvisc injection, risks and benefits were discussed and she tolerated this well.  We will plan to follow-up in 6 months for recheck or sooner for new or worsening symptoms    Patient was given instructions and counseling regarding her condition or for health maintenance advice. Please see specific information pulled into the AVS if appropriate.

## 2022-03-28 RX ORDER — CYANOCOBALAMIN 1000 UG/ML
INJECTION, SOLUTION INTRAMUSCULAR; SUBCUTANEOUS
Qty: 4 ML | Refills: 1 | OUTPATIENT
Start: 2022-03-28 | End: 2022-09-27

## 2022-04-01 DIAGNOSIS — G89.4 CHRONIC PAIN SYNDROME: ICD-10-CM

## 2022-04-01 RX ORDER — HYDROCODONE BITARTRATE AND ACETAMINOPHEN 5; 325 MG/1; MG/1
TABLET ORAL
Qty: 25 TABLET | Refills: 0 | OUTPATIENT
Start: 2022-04-01

## 2022-04-01 NOTE — TELEPHONE ENCOUNTER
HUB TO READ     Patient will have to be established with another provider with our office before we will refill. Patient has new patient appt with internal medicine. Dr. Rizo new number is [phone]

## 2022-04-04 RX ORDER — IBUPROFEN 800 MG/1
TABLET ORAL
Qty: 90 TABLET | Refills: 0 | OUTPATIENT
Start: 2022-04-04

## 2022-04-07 ENCOUNTER — OFFICE VISIT (OUTPATIENT)
Dept: INTERNAL MEDICINE | Facility: CLINIC | Age: 62
End: 2022-04-07

## 2022-04-07 VITALS
TEMPERATURE: 96.9 F | HEIGHT: 64 IN | BODY MASS INDEX: 50.02 KG/M2 | WEIGHT: 293 LBS | HEART RATE: 87 BPM | SYSTOLIC BLOOD PRESSURE: 137 MMHG | DIASTOLIC BLOOD PRESSURE: 92 MMHG | OXYGEN SATURATION: 95 %

## 2022-04-07 DIAGNOSIS — F41.9 ANXIETY: ICD-10-CM

## 2022-04-07 DIAGNOSIS — Z00.00 ENCOUNTER FOR MEDICAL EXAMINATION TO ESTABLISH CARE: Primary | ICD-10-CM

## 2022-04-07 DIAGNOSIS — E78.5 HYPERLIPIDEMIA, UNSPECIFIED HYPERLIPIDEMIA TYPE: ICD-10-CM

## 2022-04-07 DIAGNOSIS — I10 PRIMARY HYPERTENSION: ICD-10-CM

## 2022-04-07 DIAGNOSIS — E66.01 CLASS 3 SEVERE OBESITY WITH BODY MASS INDEX (BMI) OF 50.0 TO 59.9 IN ADULT, UNSPECIFIED OBESITY TYPE, UNSPECIFIED WHETHER SERIOUS COMORBIDITY PRESENT: ICD-10-CM

## 2022-04-07 DIAGNOSIS — Z11.59 ENCOUNTER FOR HEPATITIS C SCREENING TEST FOR LOW RISK PATIENT: ICD-10-CM

## 2022-04-07 DIAGNOSIS — E53.8 B12 DEFICIENCY: ICD-10-CM

## 2022-04-07 DIAGNOSIS — E55.9 VITAMIN D DEFICIENCY: ICD-10-CM

## 2022-04-07 DIAGNOSIS — G89.29 OTHER CHRONIC PAIN: ICD-10-CM

## 2022-04-07 DIAGNOSIS — G89.4 CHRONIC PAIN SYNDROME: ICD-10-CM

## 2022-04-07 DIAGNOSIS — F17.210 CIGARETTE NICOTINE DEPENDENCE WITHOUT COMPLICATION: ICD-10-CM

## 2022-04-07 DIAGNOSIS — R73.03 PREDIABETES: ICD-10-CM

## 2022-04-07 LAB
CHOLEST SERPL-MCNC: 192 MG/DL (ref 0–200)
FOLATE SERPL-MCNC: 14.9 NG/ML (ref 4.78–24.2)
HCV AB SER DONR QL: NORMAL
HDLC SERPL-MCNC: 94 MG/DL (ref 40–60)
LDLC SERPL CALC-MCNC: 82 MG/DL (ref 0–100)
LDLC/HDLC SERPL: 0.85 {RATIO}
TRIGL SERPL-MCNC: 91 MG/DL (ref 0–150)
VIT B12 BLD-MCNC: 977 PG/ML (ref 211–946)
VLDLC SERPL-MCNC: 16 MG/DL (ref 5–40)

## 2022-04-07 PROCEDURE — 82746 ASSAY OF FOLIC ACID SERUM: CPT | Performed by: STUDENT IN AN ORGANIZED HEALTH CARE EDUCATION/TRAINING PROGRAM

## 2022-04-07 PROCEDURE — 86803 HEPATITIS C AB TEST: CPT | Performed by: STUDENT IN AN ORGANIZED HEALTH CARE EDUCATION/TRAINING PROGRAM

## 2022-04-07 PROCEDURE — 80061 LIPID PANEL: CPT | Performed by: STUDENT IN AN ORGANIZED HEALTH CARE EDUCATION/TRAINING PROGRAM

## 2022-04-07 PROCEDURE — 82607 VITAMIN B-12: CPT | Performed by: STUDENT IN AN ORGANIZED HEALTH CARE EDUCATION/TRAINING PROGRAM

## 2022-04-07 PROCEDURE — 99204 OFFICE O/P NEW MOD 45 MIN: CPT | Performed by: STUDENT IN AN ORGANIZED HEALTH CARE EDUCATION/TRAINING PROGRAM

## 2022-04-07 RX ORDER — SEMAGLUTIDE 1.34 MG/ML
1 INJECTION, SOLUTION SUBCUTANEOUS WEEKLY
Qty: 3 PEN | Refills: 1 | Status: SHIPPED | OUTPATIENT
Start: 2022-04-07 | End: 2022-07-06

## 2022-04-07 RX ORDER — HYDROCODONE BITARTRATE AND ACETAMINOPHEN 5; 325 MG/1; MG/1
1 TABLET ORAL EVERY 6 HOURS PRN
Qty: 12 TABLET | Refills: 0 | OUTPATIENT
Start: 2022-04-07 | End: 2022-09-27

## 2022-04-07 RX ORDER — ERGOCALCIFEROL 1.25 MG/1
50000 CAPSULE ORAL WEEKLY
Qty: 12 CAPSULE | Refills: 1 | Status: SHIPPED | OUTPATIENT
Start: 2022-04-07 | End: 2022-07-13 | Stop reason: SDUPTHER

## 2022-04-07 NOTE — PROGRESS NOTES
Chief Complaint  Med Refill (Needs medication refill), Leg Pain (Painful from hip to lower part of leg-Right leg), Injections (Wants to have her Vit. B12 injection at office), and Establish Care    Subjective            Anita Estrada presents to [facility]  History of Present Illness  Prior PCP was Dr. Rizo, who is going private.     Leg pain:   Back pain:  Chronic back which radiates down into her right leg pain.  Back pain has been evaluated by by Dr. Lei, neurosurgery, in the past.   Has follow up with pain management in April 26th for the back pain.     Chronic left knee pain:   Has had gel (presumed hyaluronic) injection in the left knee.      Obesity:   Weight gain:  Believes it may be related to her eating more as a coping mechanism with the loss of 2 family members last year.   Mother passed away in 87. Daughter passed away at 45, unexpectedly; states she was walking her dog and fell dead. Daughter's death was deemed to be natural death by autopsy.   Patient also stopped taking her ozempic for several months as well, which contributed to the wt gain.   Was 378 before then got down to 317lbs with ozempic.       EVAN:  Chronic, she is compliant with her CPAP.  On CPAP since 2008.   Follows with Dr. Araiza.   States her machine is one of the machine that was recalled, however she's continued to use it as she has significant HA and feels poorly if she does not use.     Hypertension :   sbp at home of 120/89 at home.     Bipolar:   Anxiety:  She was previously on lamictal, but took herself off.   She follows with Doreen.   Anxiety is worse when she knows she has to come in to the doctor or when she is about to have an MRI.  Has MRI in Feb and was provided with WALTOP for this which works.        Nicotine dependence:  Current smoker, about 1 pack per week.   Started at 14.   Quit in 2007, started in 2021.   Wants to quit.   Has not bought any cigarettes for  past 2 weeks.     Health Maintenance:  S/p hysterectomy.  No hx of abnormal pap.  States she drinks about 1 pitcher of beer, about once a week.   Mammogram scheduled for next week.   Colonoscopy: first 2020, tubular adenoma with rec for 5 yr recall.     Migraine HA:   Hx of. Has not had any for past several years.     Past Medical History:   Diagnosis Date   • Acid reflux    • Arthritis    • Asthma    • Carpal tunnel syndrome    • Claustrophobia    • Eczema    • Essential hypertension 03/02/2015   • Foot pain, bilateral    • Hypertension    • Ingrowing nail 08/08/2018   • Limb swelling    • Lumbar back pain    • Migraine headache    • Obesity    • Plantar fascial fibromatosis of right foot 10/11/2018   • Pressure ulcer, stage 1    • Sleep apnea with use of continuous positive airway pressure (CPAP)    • Tinea unguium        Allergies:   No Known Allergies       Past Surgical History:   Procedure Laterality Date   • BREAST LUMPECTOMY Right 2011   • CARPAL TUNNEL RELEASE Right 03/06/2015, 9/15/17   • DILATATION AND CURETTAGE  1976   • HYSTERECTOMY  1983   • INTRAOCULAR LENS INSERTION      left lumpectomy as well?   Shoulder repair in 2020.   Partial hysterectomy- due to post delivery hemorrhage; has ovaries in place.       Social History     Socioeconomic History   • Marital status:    Tobacco Use   • Smoking status: Current Some Day Smoker     Types: Cigarettes   • Smokeless tobacco: Never Used   • Tobacco comment: smoked 21-30 years now 1-2 cigs a week   Vaping Use   • Vaping Use: Never used   Substance and Sexual Activity   • Alcohol use: Yes     Comment: 3 - 4 drinkson Wed and Thursdays , has been drinking for 31 or more years   • Drug use: Never   • Sexual activity: Defer         Family History   Problem Relation Age of Onset   • Arthritis Mother         Family history of certain chronic disabling diseases   • Osteoporosis Mother    • Cancer Father         unspecified   • Stroke Other         not specified    • Diabetes Other         unspecified type   Paternal/maternal first cousins with breast cancer in their 50's.  Multiple patternal aunts with diabetes.   Father passed away from leukemia,  at 63.   Mother with multiple strokes, 1st in her 80's. DC at 87.   Paternal grandmother with diabetes.       Health Maintenance Due   Topic Date Due   • ANNUAL PHYSICAL  Never done   • Pneumococcal Vaccine 0-64 (1 - PCV) Never done   • TDAP/TD VACCINES (1 - Tdap) Never done   • ZOSTER VACCINE (1 of 2) Never done   • DIABETIC FOOT EXAM  Never done   • DIABETIC EYE EXAM  Never done   • COVID-19 Vaccine (3 - Booster for Pfizer series) 2022            Current Outpatient Medications:   •  ammonium lactate (LAC-HYDRIN) 12 % lotion, APPLY TOPICALLY TO AFFECTED AREA TWO TIMES A DAY AS NEEDED, Disp: 226 g, Rfl: 10  •  Diclofenac Sodium (VOLTAREN) 1 % gel gel, Apply  topically to the appropriate area as directed 4 (Four) Times a Day., Disp: 100 g, Rfl: 3  •  gabapentin (NEURONTIN) 800 MG tablet, Take 1 tablet by mouth 4 (Four) Times a Day As Needed (neuropathy)., Disp: 120 tablet, Rfl: 1  •  HYDROcodone-acetaminophen (NORCO) 5-325 MG per tablet, Take 1 tablet by mouth Every 6 (Six) Hours As Needed for Moderate Pain ., Disp: 12 tablet, Rfl: 0  •  ibuprofen (ADVIL,MOTRIN) 800 MG tablet, TAKE 1 TABLET BY MOUTH THREE TIMES A DAY AS NEEDED, Disp: 90 tablet, Rfl: 0  •  losartan (COZAAR) 50 MG tablet, TAKE 1 TABLET BY MOUTH EVERY DAY, Disp: 30 tablet, Rfl: 6  •  methocarbamol (ROBAXIN) 500 MG tablet, TAKE 1 TABLET BY MOUTH THREE TIMES A DAY AS NEEDED FOR MUSCLE SPASMS, Disp: 30 tablet, Rfl: 0  •  vitamin D (ERGOCALCIFEROL) 1.25 MG (91133 UT) capsule capsule, Take 1 capsule by mouth 1 (One) Time Per Week., Disp: 12 capsule, Rfl: 1  •  cyanocobalamin 1000 MCG/ML injection, INJECT 1 ML INTRAMUSCULARLY EVERY 2 WEEKS, Disp: 4 mL, Rfl: 1  •  Semaglutide, 1 MG/DOSE, (Ozempic, 1 MG/DOSE,) 4 MG/3ML solution pen-injector, Inject 1 mg under the  "skin into the appropriate area as directed 1 (One) Time Per Week for 90 days., Disp: 3 pen, Rfl: 1    Current Facility-Administered Medications:   •  cyanocobalamin injection 1,000 mcg, 1,000 mcg, Intramuscular, Q28 Days, Valentino Rizo MD, 1,000 mcg at 11/22/21 1146  •  cyanocobalamin injection 1,000 mcg, 1,000 mcg, Intramuscular, Q28 Days, Valentino Rizo MD, 1,000 mcg at 02/28/22 1127  •  cyanocobalamin injection 1,000 mcg, 1,000 mcg, Intramuscular, Q28 Days, Valentino Rizo MD, 1,000 mcg at 01/25/22 1118      Immunization History   Administered Date(s) Administered   • COVID-19 (PFIZER) PURPLE CAP 03/23/2021, 10/13/2021   • Flu Vaccine Quad PF >36MO 11/13/2018, 11/01/2019   • FluLaval/Fluarix/Fluzone >6 10/19/2021   • Fluzone Split Quad (Multi-dose) 09/29/2017   • Hepatitis A 07/13/2018   Fully vaccinated against Covid with pfizer vaccine: 10/13/21, 4/13/21, 3/23/21    Review of Systems   Per hpi     Objective       Vitals:    04/07/22 1506   BP: 137/92   BP Location: Left arm   Patient Position: Sitting   Cuff Size: Small Adult   Pulse: 87   Temp: 96.9 °F (36.1 °C)   TempSrc: Skin   SpO2: 95%   Weight: (!) 158 kg (349 lb)   Height: 162.6 cm (64\")     Body mass index is 59.91 kg/m².      Physical Exam  Vitals reviewed.   Constitutional:       Appearance: Normal appearance. She is well-developed.   HENT:      Head: Normocephalic and atraumatic.      Right Ear: External ear normal.      Left Ear: External ear normal.      Mouth/Throat:      Pharynx: No oropharyngeal exudate.   Eyes:      Conjunctiva/sclera: Conjunctivae normal.      Pupils: Pupils are equal, round, and reactive to light.   Cardiovascular:      Rate and Rhythm: Normal rate and regular rhythm.      Pulses: Normal pulses.      Heart sounds: No murmur heard.    No friction rub. No gallop.   Pulmonary:      Effort: Pulmonary effort is normal.      Breath sounds: Normal breath sounds. No wheezing or rhonchi.   Skin:     General: Skin is warm and dry. "      Findings: No rash.   Neurological:      Mental Status: She is alert and oriented to person, place, and time.      Cranial Nerves: No cranial nerve deficit.   Psychiatric:         Mood and Affect: Mood and affect normal.         Behavior: Behavior normal.         Thought Content: Thought content normal.         Judgment: Judgment normal.             Result Review :                           Assessment and Plan      Diagnoses and all orders for this visit:    1. Encounter for medical examination to establish care (Primary)  Comments:  Acute needs addressed below.       2. B12 deficiency  Comments:  Chronic hx of. On B12 shots. Due for labs which will be collected prior to resuming injections.   Orders:  -     Vitamin B12 & Folate    3. Class 3 severe obesity with body mass index (BMI) of 50.0 to 59.9 in adult, unspecified obesity type, unspecified whether serious comorbidity present (HCC)  Comments:  Chronic and improving with pt initating her ozempic. Increased ozempic to 1mg weekly. F/u in 4 wks.   Orders:  -     Semaglutide, 1 MG/DOSE, (Ozempic, 1 MG/DOSE,) 4 MG/3ML solution pen-injector; Inject 1 mg under the skin into the appropriate area as directed 1 (One) Time Per Week for 90 days.  Dispense: 3 pen; Refill: 1  -     Lipid panel  -     Ambulatory Referral to Bariatric Surgery    4. Prediabetes  Comments:  Chronic with A1c of 5.8 in Feb 2022. Continue ozempic. Encouraged exercise and dietary changes for wt loss.   Orders:  -     Semaglutide, 1 MG/DOSE, (Ozempic, 1 MG/DOSE,) 4 MG/3ML solution pen-injector; Inject 1 mg under the skin into the appropriate area as directed 1 (One) Time Per Week for 90 days.  Dispense: 3 pen; Refill: 1    5. Anxiety  Comments:  Chornic and stable. Encouraged pt to continue following with psychotherapy.     6. Primary hypertension  Comments:  Chronic and in fair control at home, above goal in office. Pt to keep log of bp and to bring for review at f/u.     7. Cigarette nicotine  dependence without complication  Comments:  Chronic, wants to quit, however does not have a quit date. Encouraged pt to continue working on smoing cessation. She was able to quit for 14yr before.     8. Hyperlipidemia, unspecified hyperlipidemia type  Comments:  Chronic, due for labs.   Orders:  -     Lipid panel    9. Vitamin D deficiency  Comments:  Chronic and low at 23. On supplement which is refilled. Repeat vit D in 2 mos with diabetic labs.   Orders:  -     vitamin D (ERGOCALCIFEROL) 1.25 MG (96208 UT) capsule capsule; Take 1 capsule by mouth 1 (One) Time Per Week.  Dispense: 12 capsule; Refill: 1    10. Other chronic pain  Comments:  Chroic, she has an appoint with pain specialist to re-establish care. Courtesy fill of norco sent in.     11. Chronic pain syndrome  Comments:  Chronic and persisting. see above.   Orders:  -     HYDROcodone-acetaminophen (NORCO) 5-325 MG per tablet; Take 1 tablet by mouth Every 6 (Six) Hours As Needed for Moderate Pain .  Dispense: 12 tablet; Refill: 0    12. Encounter for hepatitis C screening test for low risk patient  Comments:  Due for screening per recommended guidelines.   Orders:  -     Hepatitis C antibody            Follow Up     Return in about 4 weeks (around 5/5/2022) for Recheck HTN .    Patient was given instructions and counseling regarding her condition or for health maintenance advice. Please see specific information pulled into the AVS if appropriate.     Luz Corrales MD   Internal Medicine-Pediatrics

## 2022-04-08 ENCOUNTER — PRIOR AUTHORIZATION (OUTPATIENT)
Dept: INTERNAL MEDICINE | Facility: CLINIC | Age: 62
End: 2022-04-08

## 2022-04-13 ENCOUNTER — HOSPITAL ENCOUNTER (OUTPATIENT)
Dept: MAMMOGRAPHY | Facility: HOSPITAL | Age: 62
Discharge: HOME | End: 2022-04-13
Admitting: FAMILY MEDICINE

## 2022-04-13 DIAGNOSIS — Z12.31 VISIT FOR SCREENING MAMMOGRAM: ICD-10-CM

## 2022-04-13 PROCEDURE — 77063 BREAST TOMOSYNTHESIS BI: CPT

## 2022-04-13 PROCEDURE — 77067 SCR MAMMO BI INCL CAD: CPT

## 2022-04-23 DIAGNOSIS — M17.12 OSTEOARTHRITIS OF LEFT KNEE, UNSPECIFIED OSTEOARTHRITIS TYPE: ICD-10-CM

## 2022-05-09 ENCOUNTER — OFFICE VISIT (OUTPATIENT)
Dept: PODIATRY | Facility: CLINIC | Age: 62
End: 2022-05-09

## 2022-05-09 VITALS
WEIGHT: 293 LBS | DIASTOLIC BLOOD PRESSURE: 75 MMHG | OXYGEN SATURATION: 96 % | SYSTOLIC BLOOD PRESSURE: 151 MMHG | HEART RATE: 83 BPM | BODY MASS INDEX: 50.02 KG/M2 | HEIGHT: 64 IN | TEMPERATURE: 97.5 F

## 2022-05-09 DIAGNOSIS — B35.1 ONYCHOMYCOSIS: ICD-10-CM

## 2022-05-09 DIAGNOSIS — M79.671 FOOT PAIN, BILATERAL: Primary | ICD-10-CM

## 2022-05-09 DIAGNOSIS — L60.0 ONYCHOCRYPTOSIS: ICD-10-CM

## 2022-05-09 DIAGNOSIS — M79.672 FOOT PAIN, BILATERAL: Primary | ICD-10-CM

## 2022-05-09 DIAGNOSIS — M17.12 OSTEOARTHRITIS OF LEFT KNEE, UNSPECIFIED OSTEOARTHRITIS TYPE: ICD-10-CM

## 2022-05-09 PROCEDURE — 11721 DEBRIDE NAIL 6 OR MORE: CPT | Performed by: PODIATRIST

## 2022-05-09 RX ORDER — HYDROCHLOROTHIAZIDE 25 MG/1
12.5 TABLET ORAL DAILY
COMMUNITY
Start: 2022-04-18 | End: 2022-06-09 | Stop reason: SDUPTHER

## 2022-05-09 NOTE — PROGRESS NOTES
[facility] - PODIATRY    Today's Date: 05/09/22    Patient Name: Anita Estrada  MRN: [id number]  CSN: [id number]  PCP: Luz Corrales MD, Last PCP Visit: 7 April 2022  Referring Provider: No ref. provider found    SUBJECTIVE     Chief Complaint   Patient presents with   • Left Foot - Follow-up, Nail Problem   • Right Foot - Follow-up, Nail Problem     HPI: Anita Estrada, a 61 y.o.female, comes to clinic.    New, Established, New Problem:  established   Location:  Toenails  Duration:   Greater than five years  Onset:  Gradual  Nature:  sore with palpation.  Stable, worsening, improving:   stable  Aggravating factors:  Pain with shoe gear and ambulation.  Previous Treatment:  debridement    Patient relates no medical changes since their last visit.    Patient denies any fevers, chills, nausea, vomiting, shortness of breath, nor any other constitutional signs nor symptoms.       Past Medical History:   Diagnosis Date   • Acid reflux    • Arthritis    • Asthma    • Carpal tunnel syndrome    • Claustrophobia    • Eczema    • Essential hypertension 03/02/2015   • Foot pain, bilateral    • Hypertension    • Ingrowing nail 08/08/2018   • Limb swelling    • Lumbar back pain    • Migraine headache    • Obesity    • Plantar fascial fibromatosis of right foot 10/11/2018   • Pressure ulcer, stage 1    • Sleep apnea with use of continuous positive airway pressure (CPAP)    • Tinea unguium      Past Surgical History:   Procedure Laterality Date   • BREAST LUMPECTOMY Right 2011   • CARPAL TUNNEL RELEASE Right 03/06/2015, 9/15/17   • DILATATION AND CURETTAGE  1976   • HYSTERECTOMY  1983   • INTRAOCULAR LENS INSERTION       Family History   Problem Relation Age of Onset   • Arthritis Mother         Family history of certain chronic disabling diseases   • Osteoporosis Mother    • Cancer Father         unspecified   • Stroke Other         not specified   • Diabetes Other         unspecified type     Social  History     Socioeconomic History   • Marital status:    Tobacco Use   • Smoking status: Current Some Day Smoker     Types: Cigarettes   • Smokeless tobacco: Never Used   • Tobacco comment: smoked 21-30 years now 1-2 cigs a week   Vaping Use   • Vaping Use: Never used   Substance and Sexual Activity   • Alcohol use: Yes     Comment: 3 - 4 drinkson Wed and Thursdays , has been drinking for 31 or more years   • Drug use: Never   • Sexual activity: Defer     No Known Allergies  Current Outpatient Medications   Medication Sig Dispense Refill   • ammonium lactate (LAC-HYDRIN) 12 % lotion APPLY TOPICALLY TO AFFECTED AREA TWO TIMES A DAY AS NEEDED 226 g 10   • cyanocobalamin 1000 MCG/ML injection INJECT 1 ML INTRAMUSCULARLY EVERY 2 WEEKS 4 mL 1   • Diclofenac Sodium (VOLTAREN) 1 % gel gel Apply  topically to the appropriate area as directed 4 (Four) Times a Day. 100 g 3   • gabapentin (NEURONTIN) 800 MG tablet Take 1 tablet by mouth 4 (Four) Times a Day As Needed (neuropathy). 120 tablet 1   • hydroCHLOROthiazide (HYDRODIURIL) 25 MG tablet Take 12.5 mg by mouth Daily.     • HYDROcodone-acetaminophen (NORCO) 5-325 MG per tablet Take 1 tablet by mouth Every 6 (Six) Hours As Needed for Moderate Pain . 12 tablet 0   • ibuprofen (ADVIL,MOTRIN) 800 MG tablet TAKE 1 TABLET BY MOUTH THREE TIMES A DAY AS NEEDED 90 tablet 0   • losartan (COZAAR) 50 MG tablet TAKE 1 TABLET BY MOUTH EVERY DAY 30 tablet 6   • methocarbamol (ROBAXIN) 500 MG tablet TAKE 1 TABLET BY MOUTH THREE TIMES A DAY AS NEEDED FOR MUSCLE SPASMS 30 tablet 0   • Semaglutide, 1 MG/DOSE, (Ozempic, 1 MG/DOSE,) 4 MG/3ML solution pen-injector Inject 1 mg under the skin into the appropriate area as directed 1 (One) Time Per Week for 90 days. 3 pen 1   • vitamin D (ERGOCALCIFEROL) 1.25 MG (43074 UT) capsule capsule Take 1 capsule by mouth 1 (One) Time Per Week. 12 capsule 1     Current Facility-Administered Medications   Medication Dose Route Frequency Provider Last  Rate Last Admin   • cyanocobalamin injection 1,000 mcg  1,000 mcg Intramuscular Q28 Days Valentino Rizo MD   1,000 mcg at 11/22/21 1146   • cyanocobalamin injection 1,000 mcg  1,000 mcg Intramuscular Q28 Days Valentino Rizo MD   1,000 mcg at 02/28/22 1127   • cyanocobalamin injection 1,000 mcg  1,000 mcg Intramuscular Q28 Days Valentino Rizo MD   1,000 mcg at 01/25/22 1118     Review of Systems   Constitutional: Negative.    Skin:        Painful toenails.   All other systems reviewed and are negative.      OBJECTIVE     Vitals:    05/09/22 1429   BP: 151/75   Pulse: 83   Temp: 97.5 °F (36.4 °C)   SpO2: 96%       Patient seen in no apparent distress.      PHYSICAL EXAM:     Foot/Ankle Exam:       General:   Appearance: obesity    Orientation: AAOx3    Affect: appropriate    Gait: unimpaired    Shoe Gear:  Casual shoes    VASCULAR      Right Foot Vascularity   Normal vascular exam    Dorsalis pedis:  2+  Posterior tibial:  2+  Skin Temperature: warm    Edema Grading:  None  CFT:  < 3 seconds  Pedal Hair Growth:  Present  Varicosities: none       Left Foot Vascularity   Normal vascular exam    Dorsalis pedis:  2+  Posterior tibial:  2+  Skin Temperature: warm    Edema Grading:  None  CFT:  < 3 seconds  Pedal Hair Growth:  Present  Varicosities: none        NEUROLOGIC     Right Foot Neurologic   Normal sensation    Light touch sensation:  Normal  Vibratory sensation:  Normal  Hot/Cold sensation: normal    Protective Sensation using Campbell-Bryanna Monofilament:  10     Left Foot Neurologic   Normal sensation    Light touch sensation:  Normal  Vibratory sensation:  Normal  Hot/cold sensation: normal    Protective Sensation using Campbell-Bryanna Monofilament:  10     MUSCLE STRENGTH     Right Foot Muscle Strength   Normal strength    Foot dorsiflexion:  5  Foot plantar flexion:  5  Foot inversion:  5  Foot eversion:  5     Left Foot Muscle Strength   Normal strength    Foot dorsiflexion:  5  Foot plantar flexion:   5  Foot inversion:  5  Foot eversion:  5     RANGE OF MOTION      Right Foot Range of Motion   Foot and ankle ROM within normal limits       Left Foot Range of Motion   Foot and ankle ROM within normal limits       DERMATOLOGIC     Right Foot Dermatologic   Skin: skin intact    Nails: onychomycosis, abnormally thick, subungual debris, dystrophic nails and ingrown toenail    Nails comment:  Toenails 1, 2, 3, 4     Left Foot Dermatologic   Skin: skin intact    Nails: onychomycosis, abnormally thick, subungual debris, dystrophic nails and ingrown toenail    Nails comment:  Toenails 1, 2, 3, 4, and 5      ASSESSMENT/PLAN     Diagnoses and all orders for this visit:    1. Foot pain, bilateral (Primary)    2. Onychocryptosis    3. Onychomycosis        Comprehensive lower extremity examination and evaluation was performed.    Discussed findings and treatment plan including risks, benefits, and treatment options with patient in detail. Patient agreed with treatment plan.    Toenails 1 through 5 on left and toenails 1 through 4 on right were debrided in thickness and length and then smoothed with a Dremel Tool.  Tolerated the procedure well without complications.    An After Visit Summary was printed and given to the patient at discharge, including (if requested) any available informative/educational handouts regarding diagnosis, treatment, or medications. All questions were answered to patient/family satisfaction. Should symptoms fail to improve or worsen they agree to call or return to clinic or to go to the Emergency Department. Discussed the importance of following up with any needed screening tests/labs/specialist appointments and any requested follow-up recommended by me today. Importance of maintaining follow-up discussed and patient accepts that missed appointments can delay diagnosis and potentially lead to worsening of conditions.    Return in about 9 weeks (around 7/11/2022) for Toenail Care., or sooner if acute  issues arise.    This document has been electronically signed by Ranulfo Huizar DPM on May 9, 2022 15:05 EDT

## 2022-06-09 DIAGNOSIS — G89.4 CHRONIC PAIN SYNDROME: ICD-10-CM

## 2022-06-09 DIAGNOSIS — M62.838 MUSCLE SPASM: ICD-10-CM

## 2022-06-09 DIAGNOSIS — G62.9 POLYNEUROPATHY: ICD-10-CM

## 2022-06-09 DIAGNOSIS — E55.9 VITAMIN D DEFICIENCY: ICD-10-CM

## 2022-06-09 RX ORDER — HYDROCODONE BITARTRATE AND ACETAMINOPHEN 5; 325 MG/1; MG/1
1 TABLET ORAL EVERY 6 HOURS PRN
Qty: 12 TABLET | Refills: 0 | Status: CANCELLED | OUTPATIENT
Start: 2022-06-09

## 2022-06-09 RX ORDER — GABAPENTIN 800 MG/1
800 TABLET ORAL 4 TIMES DAILY PRN
Qty: 120 TABLET | Refills: 1 | Status: CANCELLED | OUTPATIENT
Start: 2022-06-09

## 2022-06-09 NOTE — TELEPHONE ENCOUNTER
LOV was 04/07/22, LRF for Hydrocodone #12 & 02/04/22 Gabapentin #120 with 1 refill, UDS completed on 01/25/22, shantell none on file and contract signed on 02/03/22. Patient is due for shantell.

## 2022-06-09 NOTE — TELEPHONE ENCOUNTER
Caller: Anita Estrada SYDNEE    Relationship: Self    Best call back number: [phone]    Requested Prescriptions:   Requested Prescriptions     Pending Prescriptions Disp Refills   • gabapentin (NEURONTIN) 800 MG tablet 120 tablet 1     Sig: Take 1 tablet by mouth 4 (Four) Times a Day As Needed (neuropathy).   • hydroCHLOROthiazide (HYDRODIURIL) 25 MG tablet       Sig: Take 0.5 tablets by mouth Daily.   • HYDROcodone-acetaminophen (NORCO) 5-325 MG per tablet 12 tablet 0     Sig: Take 1 tablet by mouth Every 6 (Six) Hours As Needed for Moderate Pain .   • methocarbamol (ROBAXIN) 500 MG tablet 30 tablet 0     Sig: Take 1 tablet by mouth 3 (Three) Times a Day As Needed.        Pharm[facility] where request [facility] sent[facility] [address] - [phone] [facility] [phone] FX     Does the patient have less than a 3 day supply:  [] Yes  [x] No    Kennedy Narvaez Rep   06/09/22 14:33 EDT

## 2022-06-13 RX ORDER — HYDROCHLOROTHIAZIDE 25 MG/1
12.5 TABLET ORAL DAILY
Qty: 45 TABLET | Refills: 1 | OUTPATIENT
Start: 2022-06-13 | End: 2022-09-27

## 2022-06-13 RX ORDER — METHOCARBAMOL 500 MG/1
500 TABLET, FILM COATED ORAL 3 TIMES DAILY PRN
Qty: 30 TABLET | Refills: 1 | Status: SHIPPED | OUTPATIENT
Start: 2022-06-13 | End: 2022-07-18

## 2022-06-13 NOTE — TELEPHONE ENCOUNTER
Norco filled on 4/7 visit as courtesy as this was her establish care visit with the office and she had plan to follow up with pain medicine in late April.     Unclear why she is requesting her gabapentin and norco through us. Will plan to discuss this at her next visit on 6/15.     Filled robaxin and HCTZ.     Luz Corrales MD

## 2022-07-01 DIAGNOSIS — M62.838 MUSCLE SPASM: ICD-10-CM

## 2022-07-01 RX ORDER — METHOCARBAMOL 500 MG/1
TABLET, FILM COATED ORAL
Qty: 30 TABLET | Refills: 1 | OUTPATIENT
Start: 2022-07-01

## 2022-07-13 ENCOUNTER — OFFICE VISIT (OUTPATIENT)
Dept: INTERNAL MEDICINE | Facility: CLINIC | Age: 62
End: 2022-07-13

## 2022-07-13 VITALS
SYSTOLIC BLOOD PRESSURE: 132 MMHG | BODY MASS INDEX: 50.02 KG/M2 | WEIGHT: 293 LBS | HEIGHT: 64 IN | OXYGEN SATURATION: 100 % | HEART RATE: 79 BPM | DIASTOLIC BLOOD PRESSURE: 90 MMHG | TEMPERATURE: 96.7 F

## 2022-07-13 DIAGNOSIS — E55.9 VITAMIN D DEFICIENCY: ICD-10-CM

## 2022-07-13 DIAGNOSIS — M17.12 OSTEOARTHRITIS OF LEFT KNEE, UNSPECIFIED OSTEOARTHRITIS TYPE: ICD-10-CM

## 2022-07-13 DIAGNOSIS — E66.01 CLASS 3 SEVERE OBESITY DUE TO EXCESS CALORIES WITH SERIOUS COMORBIDITY AND BODY MASS INDEX (BMI) OF 60.0 TO 69.9 IN ADULT: ICD-10-CM

## 2022-07-13 DIAGNOSIS — Z13.29 THYROID DISORDER SCREEN: ICD-10-CM

## 2022-07-13 DIAGNOSIS — M79.641 RIGHT HAND PAIN: ICD-10-CM

## 2022-07-13 DIAGNOSIS — Z13.220 LIPID SCREENING: ICD-10-CM

## 2022-07-13 DIAGNOSIS — E53.8 B12 DEFICIENCY: ICD-10-CM

## 2022-07-13 DIAGNOSIS — R73.03 PRE-DIABETES: ICD-10-CM

## 2022-07-13 DIAGNOSIS — M79.672 LEFT FOOT PAIN: Primary | ICD-10-CM

## 2022-07-13 DIAGNOSIS — I10 ESSENTIAL HYPERTENSION: ICD-10-CM

## 2022-07-13 DIAGNOSIS — M19.90 ARTHRITIS: ICD-10-CM

## 2022-07-13 LAB
25(OH)D3 SERPL-MCNC: 24.1 NG/ML (ref 30–100)
ALBUMIN SERPL-MCNC: 4.3 G/DL (ref 3.5–5.2)
ALBUMIN/GLOB SERPL: 1.4 G/DL
ALP SERPL-CCNC: 63 U/L (ref 39–117)
ALT SERPL W P-5'-P-CCNC: 16 U/L (ref 1–33)
ANION GAP SERPL CALCULATED.3IONS-SCNC: 11.6 MMOL/L (ref 5–15)
AST SERPL-CCNC: 14 U/L (ref 1–32)
BASOPHILS # BLD AUTO: 0.05 10*3/MM3 (ref 0–0.2)
BASOPHILS NFR BLD AUTO: 0.8 % (ref 0–1.5)
BILIRUB SERPL-MCNC: 0.2 MG/DL (ref 0–1.2)
BUN SERPL-MCNC: 16 MG/DL (ref 8–23)
BUN/CREAT SERPL: 15.1 (ref 7–25)
CALCIUM SPEC-SCNC: 9.2 MG/DL (ref 8.6–10.5)
CHLORIDE SERPL-SCNC: 102 MMOL/L (ref 98–107)
CHOLEST SERPL-MCNC: 180 MG/DL (ref 0–200)
CO2 SERPL-SCNC: 26.4 MMOL/L (ref 22–29)
CREAT SERPL-MCNC: 1.06 MG/DL (ref 0.57–1)
DEPRECATED RDW RBC AUTO: 39.8 FL (ref 37–54)
EGFRCR SERPLBLD CKD-EPI 2021: 59.1 ML/MIN/1.73
EOSINOPHIL # BLD AUTO: 0.09 10*3/MM3 (ref 0–0.4)
EOSINOPHIL NFR BLD AUTO: 1.4 % (ref 0.3–6.2)
ERYTHROCYTE [DISTWIDTH] IN BLOOD BY AUTOMATED COUNT: 12.6 % (ref 12.3–15.4)
FOLATE SERPL-MCNC: 11.4 NG/ML (ref 4.78–24.2)
GLOBULIN UR ELPH-MCNC: 3 GM/DL
GLUCOSE SERPL-MCNC: 84 MG/DL (ref 65–99)
HBA1C MFR BLD: 5.5 % (ref 4.8–5.6)
HCT VFR BLD AUTO: 37.5 % (ref 34–46.6)
HDLC SERPL-MCNC: 87 MG/DL (ref 40–60)
HGB BLD-MCNC: 12.9 G/DL (ref 12–15.9)
IMM GRANULOCYTES # BLD AUTO: 0.01 10*3/MM3 (ref 0–0.05)
IMM GRANULOCYTES NFR BLD AUTO: 0.2 % (ref 0–0.5)
LDLC SERPL CALC-MCNC: 77 MG/DL (ref 0–100)
LDLC/HDLC SERPL: 0.87 {RATIO}
LYMPHOCYTES # BLD AUTO: 2.61 10*3/MM3 (ref 0.7–3.1)
LYMPHOCYTES NFR BLD AUTO: 42 % (ref 19.6–45.3)
MCH RBC QN AUTO: 30.3 PG (ref 26.6–33)
MCHC RBC AUTO-ENTMCNC: 34.4 G/DL (ref 31.5–35.7)
MCV RBC AUTO: 88 FL (ref 79–97)
MONOCYTES # BLD AUTO: 0.6 10*3/MM3 (ref 0.1–0.9)
MONOCYTES NFR BLD AUTO: 9.7 % (ref 5–12)
NEUTROPHILS NFR BLD AUTO: 2.85 10*3/MM3 (ref 1.7–7)
NEUTROPHILS NFR BLD AUTO: 45.9 % (ref 42.7–76)
NRBC BLD AUTO-RTO: 0 /100 WBC (ref 0–0.2)
PLATELET # BLD AUTO: 231 10*3/MM3 (ref 140–450)
PMV BLD AUTO: 11.6 FL (ref 6–12)
POTASSIUM SERPL-SCNC: 4 MMOL/L (ref 3.5–5.2)
PROT SERPL-MCNC: 7.3 G/DL (ref 6–8.5)
RBC # BLD AUTO: 4.26 10*6/MM3 (ref 3.77–5.28)
SODIUM SERPL-SCNC: 140 MMOL/L (ref 136–145)
TRIGL SERPL-MCNC: 87 MG/DL (ref 0–150)
TSH SERPL DL<=0.05 MIU/L-ACNC: 2.32 UIU/ML (ref 0.27–4.2)
VIT B12 BLD-MCNC: 561 PG/ML (ref 211–946)
VLDLC SERPL-MCNC: 16 MG/DL (ref 5–40)
WBC NRBC COR # BLD: 6.21 10*3/MM3 (ref 3.4–10.8)

## 2022-07-13 PROCEDURE — 84443 ASSAY THYROID STIM HORMONE: CPT | Performed by: NURSE PRACTITIONER

## 2022-07-13 PROCEDURE — 80061 LIPID PANEL: CPT | Performed by: NURSE PRACTITIONER

## 2022-07-13 PROCEDURE — 99214 OFFICE O/P EST MOD 30 MIN: CPT | Performed by: NURSE PRACTITIONER

## 2022-07-13 PROCEDURE — 82306 VITAMIN D 25 HYDROXY: CPT | Performed by: NURSE PRACTITIONER

## 2022-07-13 PROCEDURE — 82607 VITAMIN B-12: CPT | Performed by: NURSE PRACTITIONER

## 2022-07-13 PROCEDURE — 85025 COMPLETE CBC W/AUTO DIFF WBC: CPT | Performed by: NURSE PRACTITIONER

## 2022-07-13 PROCEDURE — 82746 ASSAY OF FOLIC ACID SERUM: CPT | Performed by: NURSE PRACTITIONER

## 2022-07-13 PROCEDURE — 83036 HEMOGLOBIN GLYCOSYLATED A1C: CPT | Performed by: NURSE PRACTITIONER

## 2022-07-13 PROCEDURE — 80053 COMPREHEN METABOLIC PANEL: CPT | Performed by: NURSE PRACTITIONER

## 2022-07-13 RX ORDER — ERGOCALCIFEROL 1.25 MG/1
50000 CAPSULE ORAL WEEKLY
Qty: 12 CAPSULE | Refills: 1 | Status: SHIPPED | OUTPATIENT
Start: 2022-07-13 | End: 2022-11-15

## 2022-07-13 NOTE — ASSESSMENT & PLAN NOTE
Overall doing well, patient has been taking her losartan, however she has not been taking hydrochlorothiazide, I did encourage her to start taking that medication again.  This may help with some of the swelling of her feet as well.  Patient agrees and understands, we will follow-up at next regular appointment.

## 2022-07-13 NOTE — ASSESSMENT & PLAN NOTE
Patient has gained 5 pounds since her last visit, she did voice concern regarding that, she does feel like it is exacerbating the problems with her feet.  Encouraged her to continue to try to achieve some consistent weight loss measures, including diet control and exercise.  She was denied Ozempic, insurance would not cover it.  We will check labs today, could look at other medication options based on those results.

## 2022-07-13 NOTE — ASSESSMENT & PLAN NOTE
Pain in left foot, she did have an injury, we will x-ray.  We will determine plan of care based on results.

## 2022-07-13 NOTE — PROGRESS NOTES
"Chief Complaint  Hand Pain (LEFT HAND ) and FOOT PAIN (BOTH FEET )    Subjective         Anita Estrada presents to [facility] for Follow-up and concerns regarding right hand pain and left foot pain.    Patient states that over the last few weeks she has noticed right hand pain, primarily on the outer aspect of the hand.  She does not report any injury, she has been using diclofenac gel that was prescribed for her knees.  She states that it does help.  She was concerned that there may be a fracture.    Patient also concerned regarding left foot pain, she did drop something on her foot several weeks ago, it has been painful since.  She is concerned about possible fracture.  She does see Dr. Huizar with podiatry, she has an appointment with them on 8/1/2022.    Patient is also needing repeat lab work, last labs were done in April at her establish care visit with Dr. Escobar.  She is needing some refills on her medications.    Otherwise, patient does not have any significant concerns or complaints today.         Review of Systems    Objective   Vital Signs:   /90 (BP Location: Left arm, Patient Position: Sitting, Cuff Size: Adult)   Pulse 79   Temp 96.7 °F (35.9 °C) (Temporal)   Ht 162.6 cm (64\")   Wt (!) 161 kg (354 lb 9.6 oz)   SpO2 100%   BMI 60.87 kg/m²     Physical Exam  Vitals and nursing note reviewed.   Constitutional:       Appearance: Normal appearance. She is obese.   HENT:      Head: Normocephalic and atraumatic.   Cardiovascular:      Rate and Rhythm: Normal rate.   Pulmonary:      Effort: Pulmonary effort is normal.   Musculoskeletal:      Right hand: Tenderness present. No swelling or deformity. Normal range of motion.      Left hand: Normal.      Right ankle: Swelling present.      Left ankle: Swelling present. No deformity. Tenderness present. Normal range of motion.   Neurological:      Mental Status: She is alert.   Psychiatric:         Mood and Affect: Mood " normal.         Thought Content: Thought content normal.        Result Review :                   Diagnoses and all orders for this visit:    1. Left foot pain (Primary)  Assessment & Plan:  Pain in left foot, she did have an injury, we will x-ray.  We will determine plan of care based on results.    Orders:  -     XR Foot 3+ View Left    2. Right hand pain  Comments:  We will get x-ray today, follow-up based on results.  Orders:  -     XR Hand 3+ View Right    3. Osteoarthritis of left knee, unspecified osteoarthritis type  Comments:  Continue with diclofenac gel.  Orders:  -     Diclofenac Sodium (VOLTAREN) 1 % gel gel; Apply  topically to the appropriate area as directed 4 (Four) Times a Day.  Dispense: 100 g; Refill: 3    4. Vitamin D deficiency  Comments:  We will check labs today, refill medication as well.  Follow-up based on results.  Orders:  -     Vitamin D 25 Hydroxy  -     vitamin D (ERGOCALCIFEROL) 1.25 MG (65872 UT) capsule capsule; Take 1 capsule by mouth 1 (One) Time Per Week.  Dispense: 12 capsule; Refill: 1    5. Essential hypertension  Assessment & Plan:  Overall doing well, patient has been taking her losartan, however she has not been taking hydrochlorothiazide, I did encourage her to start taking that medication again.  This may help with some of the swelling of her feet as well.  Patient agrees and understands, we will follow-up at next regular appointment.    Orders:  -     CBC & Differential  -     Comprehensive Metabolic Panel    6. Arthritis  Assessment & Plan:  We will continue with diclofenac gel, I did send a refill in for her today.  She can continue to use on her knees as previously prescribed as well as her right hand as it is helping since she has been using it.  We will get x-ray of the hand as well, we will follow-up based on results.      7. Lipid screening  -     Lipid Panel    8. Thyroid disorder screen  -     TSH    9. Pre-diabetes  -     Comprehensive Metabolic Panel  -      Hemoglobin A1c    10. B12 deficiency  -     Vitamin B12 & Folate    11. Class 3 severe obesity due to excess calories with serious comorbidity and body mass index (BMI) of 60.0 to 69.9 in adult ([facility])  Assessment & Plan:  Patient has gained 5 pounds since her last visit, she did voice concern regarding that, she does feel like it is exacerbating the problems with her feet.  Encouraged her to continue to try to achieve some consistent weight loss measures, including diet control and exercise.  She was denied Ozempic, insurance would not cover it.  We will check labs today, could look at other medication options based on those results.          Follow Up   Return in about 3 months (around 10/13/2022) for Recheck.  Patient was given instructions and counseling regarding her condition or for health maintenance advice. Please see specific information pulled into the AVS if appropriate.     Mikel Prater, CHYNA  7/13/2022  This note was electronically signed.

## 2022-07-13 NOTE — ASSESSMENT & PLAN NOTE
We will continue with diclofenac gel, I did send a refill in for her today.  She can continue to use on her knees as previously prescribed as well as her right hand as it is helping since she has been using it.  We will get x-ray of the hand as well, we will follow-up based on results.

## 2022-07-16 DIAGNOSIS — M62.838 MUSCLE SPASM: ICD-10-CM

## 2022-07-18 NOTE — TELEPHONE ENCOUNTER
Ok to approve?    last fill date:  6-, 30 x 1 refill  last office visit: 6-  follow up scheduled:  10-  last urine drug screen: 1-  narcotic consent: 2-3-2022  shantell:  none on file

## 2022-07-19 ENCOUNTER — TELEPHONE (OUTPATIENT)
Dept: INTERNAL MEDICINE | Facility: CLINIC | Age: 62
End: 2022-07-19

## 2022-07-19 DIAGNOSIS — G62.9 POLYNEUROPATHY: ICD-10-CM

## 2022-07-19 RX ORDER — GABAPENTIN 800 MG/1
800 TABLET ORAL 4 TIMES DAILY PRN
Qty: 120 TABLET | Refills: 1 | Status: CANCELLED | OUTPATIENT
Start: 2022-07-19

## 2022-07-19 NOTE — TELEPHONE ENCOUNTER
Caller: Sean Anita M    Relationship: Self    Best call back number: [phone]    Requested Prescriptions:   Requested Prescriptions     Pending Prescriptions Disp Refills   • gabapentin (NEURONTIN) 800 MG tablet 120 tablet 1     Sig: Take 1 tablet by mouth 4 (Four) Times a Day As Needed (neuropathy).        [facility] [address] - [phone] [facility] [phone]      Additional details provided by patient: PATIENT STATES SHE HAS BEEN OUT OF THIS MEDICATION FOR OVER A MONTH. PATIENT STATES THAT SHE HAS BEEN REQUESTING A REFILL SINCE EARLY June AND STILL HAS NOT GOTTEN A REFILL. PATIENT STATES SHE WAS IN OFFICE ON 7.13.22 AND WAS SEEN BY RUTH BRADLEY WHO LET HER KNOW HE WOULD ASK FOR THE MEDICATION TO BE FILLED.     PATIENT STATES SHE NEEDS AN UPDATE IF THE MEDICATION WILL OR WILL NOT BE FILLED.     Does the patient have less than a 3 day supply:  [x] Yes  [] No    Kennedy Bañuelos Rep   07/19/22 15:37 EDT

## 2022-07-19 NOTE — TELEPHONE ENCOUNTER
I looked at the patients chart and it looks like she canceled her last appt with Dr. Escobar where the Gabapentin was going to be discussed.

## 2022-07-20 DIAGNOSIS — M62.838 MUSCLE SPASM: ICD-10-CM

## 2022-07-20 RX ORDER — METHOCARBAMOL 500 MG/1
TABLET, FILM COATED ORAL
Qty: 30 TABLET | Refills: 1 | Status: SHIPPED | OUTPATIENT
Start: 2022-07-20 | End: 2022-11-10 | Stop reason: SDUPTHER

## 2022-07-20 RX ORDER — METHOCARBAMOL 500 MG/1
500 TABLET, FILM COATED ORAL 3 TIMES DAILY PRN
Qty: 30 TABLET | Refills: 1 | Status: CANCELLED | OUTPATIENT
Start: 2022-07-20

## 2022-07-20 NOTE — TELEPHONE ENCOUNTER
Spoke with patient who stated that she did have an appt with pain mgt but was able to go to the appt. She stated that she saw Dr Lei in the past for her back and she decided not to do the surgery. She stated that she received these medications from Dr Rizo in the past. I informed the patient that if she is seeing pain mgt then we would not prescribe them.

## 2022-07-20 NOTE — TELEPHONE ENCOUNTER
[facility] sent a refill request over for patient. Patient was told that she needed to go to pain management.

## 2022-07-20 NOTE — TELEPHONE ENCOUNTER
duplicate request. Methocarbamol already  filled. Pain management referral is regarding controlled substances.

## 2022-07-20 NOTE — TELEPHONE ENCOUNTER
Can we call her and let her know what you found Lori.  And also upon reviewing her chart we did briefly talk about it during her establish care visit in April (4/7). She told me she had an apt scheduled with pain management on 4/26 and the plan was that she would keep that apt and discuss her pain medications with them. I courtesy filled her norco at that visit because the plan was that she would follow with pain management.     Please find out if she has established with pain management.     Luz MICHELLE

## 2022-07-21 ENCOUNTER — HOSPITAL ENCOUNTER (OUTPATIENT)
Dept: GENERAL RADIOLOGY | Facility: HOSPITAL | Age: 62
Discharge: HOME | End: 2022-07-21

## 2022-07-21 PROCEDURE — 73130 X-RAY EXAM OF HAND: CPT

## 2022-07-21 PROCEDURE — 73630 X-RAY EXAM OF FOOT: CPT

## 2022-08-01 ENCOUNTER — OFFICE VISIT (OUTPATIENT)
Dept: PODIATRY | Facility: CLINIC | Age: 62
End: 2022-08-01

## 2022-08-01 VITALS
HEIGHT: 64 IN | SYSTOLIC BLOOD PRESSURE: 133 MMHG | OXYGEN SATURATION: 98 % | HEART RATE: 74 BPM | DIASTOLIC BLOOD PRESSURE: 88 MMHG | TEMPERATURE: 97.1 F | BODY MASS INDEX: 50.02 KG/M2 | WEIGHT: 293 LBS

## 2022-08-01 DIAGNOSIS — M79.671 FOOT PAIN, BILATERAL: ICD-10-CM

## 2022-08-01 DIAGNOSIS — M79.672 FOOT PAIN, BILATERAL: ICD-10-CM

## 2022-08-01 DIAGNOSIS — B35.1 ONYCHOMYCOSIS: Primary | ICD-10-CM

## 2022-08-01 DIAGNOSIS — S90.32XA CONTUSION OF LEFT FOOT, INITIAL ENCOUNTER: ICD-10-CM

## 2022-08-01 DIAGNOSIS — L60.0 ONYCHOCRYPTOSIS: ICD-10-CM

## 2022-08-01 PROCEDURE — 11721 DEBRIDE NAIL 6 OR MORE: CPT | Performed by: PODIATRIST

## 2022-08-01 PROCEDURE — 99213 OFFICE O/P EST LOW 20 MIN: CPT | Performed by: PODIATRIST

## 2022-08-01 NOTE — PROGRESS NOTES
[facility]    Today's Date: 08/01/22    Patient Name: Anita Estrada  MRN: [id number]  CSN: [id number]  PCP: Luz Corrales MD, Last PCP Visit: 7 April 2022  Referring Provider: No ref. provider found    SUBJECTIVE     Chief Complaint   Patient presents with   • Left Foot - Nail Problem, Pain     Xray on chart 7/21/22, dropped heavy BP machine on foot    • Right Foot - Nail Problem     HPI: Anita Estrada, a 61 y.o.female, comes to clinic.    New, Established, New Problem:  established   Location:  Toenails  Duration:   Greater than five years  Onset:  Gradual  Nature:  sore with palpation.  Stable, worsening, improving:   stable  Aggravating factors:  Pain with shoe gear and ambulation.  Previous Treatment:  debridement    Medical changes: Comfortable pressure cuff on top of her left foot.  X-rays taken.  X-ray results are negative.    Patient denies any fevers, chills, nausea, vomiting, shortness of breath, nor any other constitutional signs nor symptoms.       Past Medical History:   Diagnosis Date   • Acid reflux    • Arthritis    • Asthma    • Carpal tunnel syndrome    • Claustrophobia    • Eczema    • Essential hypertension 03/02/2015   • Foot pain, bilateral    • Hypertension    • Ingrowing nail 08/08/2018   • Limb swelling    • Lumbar back pain    • Migraine headache    • Obesity    • Plantar fascial fibromatosis of right foot 10/11/2018   • Pressure ulcer, stage 1    • Sleep apnea with use of continuous positive airway pressure (CPAP)    • Tinea unguium      Past Surgical History:   Procedure Laterality Date   • BREAST LUMPECTOMY Right 2011   • CARPAL TUNNEL RELEASE Right 03/06/2015, 9/15/17   • DILATATION AND CURETTAGE  1976   • HYSTERECTOMY  1983   • INTRAOCULAR LENS INSERTION       Family History   Problem Relation Age of Onset   • Arthritis Mother         Family history of certain chronic disabling diseases   • Osteoporosis Mother    • Cancer Father         unspecified    • Stroke Other         not specified   • Diabetes Other         unspecified type     Social History     Socioeconomic History   • Marital status:    Tobacco Use   • Smoking status: Current Some Day Smoker     Types: Cigarettes   • Smokeless tobacco: Never Used   • Tobacco comment: smoked 21-30 years now 1-2 cigs a week   Vaping Use   • Vaping Use: Never used   Substance and Sexual Activity   • Alcohol use: Yes     Comment: 3 - 4 drinkson Wed and Thursdays , has been drinking for 31 or more years   • Drug use: Never   • Sexual activity: Defer     No Known Allergies  Current Outpatient Medications   Medication Sig Dispense Refill   • ammonium lactate (LAC-HYDRIN) 12 % lotion APPLY TOPICALLY TO AFFECTED AREA TWO TIMES A DAY AS NEEDED 226 g 10   • cyanocobalamin 1000 MCG/ML injection INJECT 1 ML INTRAMUSCULARLY EVERY 2 WEEKS 4 mL 1   • Diclofenac Sodium (VOLTAREN) 1 % gel gel Apply  topically to the appropriate area as directed 4 (Four) Times a Day. 100 g 3   • gabapentin (NEURONTIN) 800 MG tablet Take 1 tablet by mouth 4 (Four) Times a Day As Needed (neuropathy). 120 tablet 1   • hydroCHLOROthiazide (HYDRODIURIL) 25 MG tablet Take 0.5 tablets by mouth Daily for 90 days. 45 tablet 1   • HYDROcodone-acetaminophen (NORCO) 5-325 MG per tablet Take 1 tablet by mouth Every 6 (Six) Hours As Needed for Moderate Pain . 12 tablet 0   • ibuprofen (ADVIL,MOTRIN) 800 MG tablet TAKE 1 TABLET BY MOUTH THREE TIMES A DAY AS NEEDED 90 tablet 0   • losartan (COZAAR) 50 MG tablet TAKE 1 TABLET BY MOUTH EVERY DAY 30 tablet 6   • methocarbamol (ROBAXIN) 500 MG tablet TAKE 1 TABLET BY MOUTH THREE TIMES A DAY AS NEEDED FOR MUSCLE SPASMS 30 tablet 1   • vitamin D (ERGOCALCIFEROL) 1.25 MG (44546 UT) capsule capsule Take 1 capsule by mouth 1 (One) Time Per Week. 12 capsule 1     Current Facility-Administered Medications   Medication Dose Route Frequency Provider Last Rate Last Admin   • cyanocobalamin injection 1,000 mcg  1,000 mcg  Intramuscular Q28 Days Valentino Rizo MD   1,000 mcg at 21 1146   • cyanocobalamin injection 1,000 mcg  1,000 mcg Intramuscular Q28 Days Valentino Rizo MD   1,000 mcg at 22 1127   • cyanocobalamin injection 1,000 mcg  1,000 mcg Intramuscular Q28 Days Valentino Rizo MD   1,000 mcg at 22 1118     Review of Systems   Constitutional: Negative.    Skin:        Painful toenails.   All other systems reviewed and are negative.      OBJECTIVE     Vitals:    22 1409   BP: 133/88   Pulse: 74   Temp: 97.1 °F (36.2 °C)   SpO2: 98%       Patient seen in no apparent distress.      PHYSICAL EXAM:     Foot/Ankle Exam:       General:   Appearance: obesity    Orientation: AAOx3    Affect: appropriate    Assistance: cane    Shoe Gear:  Sandals    VASCULAR      Right Foot Vascularity   Normal vascular exam    Dorsalis pedis:  2+  Posterior tibial:  2+  Skin Temperature: warm    Edema Grading:  None  CFT:  < 3 seconds  Pedal Hair Growth:  Present  Varicosities: none       Left Foot Vascularity   Normal vascular exam    Dorsalis pedis:  2+  Posterior tibial:  2+  Skin Temperature: warm    Edema Gradin+ and non-pitting  CFT:  < 3 seconds  Pedal Hair Growth:  Present  Varicosities: none        NEUROLOGIC     Right Foot Neurologic   Normal sensation    Light touch sensation:  Normal  Vibratory sensation:  Normal  Hot/Cold sensation: normal    Protective Sensation using [place] Monofilament:  10     Left Foot Neurologic   Normal sensation    Light touch sensation:  Normal  Vibratory sensation:  Normal  Hot/cold sensation: normal    Protective Sensation using [place] Monofilament:  10     MUSCLE STRENGTH     Right Foot Muscle Strength   Foot dorsiflexion:  4+  Foot plantar flexion:  4+  Foot inversion:  4+  Foot eversion:  4+     Left Foot Muscle Strength   Foot dorsiflexion:  4+  Foot plantar flexion:  4+  Foot inversion:  4+  Foot eversion:  4+     RANGE OF MOTION      Right Foot Range of  Motion   Foot and ankle ROM within normal limits       Left Foot Range of Motion   Foot and ankle ROM within normal limits       DERMATOLOGIC     Right Foot Dermatologic   Skin: skin intact    Nails: onychomycosis, abnormally thick, subungual debris, dystrophic nails and ingrown toenail    Nails comment:  Toenails 1, 2, 3, 4     Left Foot Dermatologic   Skin: skin intact    Nails: onychomycosis, abnormally thick, subungual debris, dystrophic nails and ingrown toenail    Nails comment:  Toenails 1, 2, 3, 4, and 5    XR Foot 3+ View Left    Result Date: 7/21/2022  Narrative: PROCEDURE: XR FOOT 3+ VW LEFT  COMPARISON: [facility], CR, FOOT >OR= 3V LT, 2/17/2020, 16:25.  INDICATIONS: LEFT FOOT XRAYS DUE TO PAIN FROM DROPPING OBJECT ON TOES. X 1 1/2 MONTHS.  FINDINGS:  Three plain film images left foot are compared to previous plain film imaging study left foot performed on 2/17/2020.  Today's study reveals mild hallux valgus deformity of the left 1st metatarsal-phalangeal joint.  There is mild arthritis and bunion formation in the distal head of the left 1st metatarsal.  There is moderate soft tissue swelling overlying the dorsal aspect of the left forefoot a small bone spur overlies the plantar surface of the left calcaneal tuberosity.      Impression:   1. Moderate soft tissue swelling dorsal aspect left forefoot. 2. No evidence of fracture or dislocation of the bones in left foot      DAVID VASQUEZ MD       Electronically Signed and Approved By: DAVID VASQUEZ MD on 7/21/2022 at 15:50             ASSESSMENT/PLAN     Diagnoses and all orders for this visit:    1. Onychomycosis (Primary)    2. Foot pain, bilateral    3. Onychocryptosis    4. Contusion of left foot, initial encounter        Comprehensive lower extremity examination and evaluation was performed.    Discussed findings and treatment plan including risks, benefits, and treatment options with patient in detail. Patient agreed  with treatment plan.    Toenails 1 through 5 on left and toenails 1 through 4 on right were debrided in thickness and length and then smoothed with a Dremel Tool.  Tolerated the procedure well without complications.    Rice Therapy: It is important to treat any injury as soon as possible to help control swelling and increase recovery time. The recognized regimen for immediate treatment of sport injuries includes rest, ice (cold application), compression, and elevation (RICE). Remove the injured athlete from play, apply ice to the affected area, wrap or compress the injured area with an elastic bandage when appropriate, and elevate the injured area above heart level to reduce swelling.  The patient is to not use ice for longer than 20 minutes at a time, with at least 20 minutes of no ice usage between applications.  The patient states understanding and agreement with this plan.    Patient to use OTC Voltaren gel to affected area on left foot with dosing per package insert.    An After Visit Summary was printed and given to the patient at discharge, including (if requested) any available informative/educational handouts regarding diagnosis, treatment, or medications. All questions were answered to patient/family satisfaction. Should symptoms fail to improve or worsen they agree to call or return to clinic or to go to the Emergency Department. Discussed the importance of following up with any needed screening tests/labs/specialist appointments and any requested follow-up recommended by me today. Importance of maintaining follow-up discussed and patient accepts that missed appointments can delay diagnosis and potentially lead to worsening of conditions.    Return in about 9 weeks (around 10/3/2022) for Toenail Care., or sooner if acute issues arise.    This document has been electronically signed by Ranulfo Huizar DPM on August 1, 2022 14:57 EDT

## 2022-08-09 ENCOUNTER — OFFICE VISIT (OUTPATIENT)
Dept: ORTHOPEDIC SURGERY | Facility: CLINIC | Age: 62
End: 2022-08-09

## 2022-08-09 VITALS — WEIGHT: 293 LBS | BODY MASS INDEX: 50.02 KG/M2 | OXYGEN SATURATION: 97 % | HEART RATE: 73 BPM | HEIGHT: 64 IN

## 2022-08-09 DIAGNOSIS — M25.512 LEFT SHOULDER PAIN, UNSPECIFIED CHRONICITY: Primary | ICD-10-CM

## 2022-08-09 PROCEDURE — 99213 OFFICE O/P EST LOW 20 MIN: CPT | Performed by: ORTHOPAEDIC SURGERY

## 2022-08-09 RX ORDER — METHYLPREDNISOLONE 4 MG/1
TABLET ORAL
Qty: 21 TABLET | Refills: 0 | OUTPATIENT
Start: 2022-08-09 | End: 2022-09-27

## 2022-08-10 NOTE — PROGRESS NOTES
"Chief Complaint  Pain and Follow-up of the Left Shoulder     Subjective      Anita Estrada presents to [facility] for a follow-up of left shoulder. Patient has a history of a left arthroscopic SAD/DC and mini open RCR 1/22/20 by Dr. Todd. She recently moved in with her daughter and felt like she aggravated her shoulder in the process.     No Known Allergies     Social History     Socioeconomic History   • Marital status:    Tobacco Use   • Smoking status: Current Some Day Smoker     Types: Cigarettes   • Smokeless tobacco: Never Used   • Tobacco comment: smoked 21-30 years now 1-2 cigs a week   Vaping Use   • Vaping Use: Never used   Substance and Sexual Activity   • Alcohol use: Yes     Comment: 3 - 4 drinkson Wed and Thursdays , has been drinking for 31 or more years   • Drug use: Never   • Sexual activity: Defer        Review of Systems     Objective   Vital Signs:   Pulse 73   Ht 162.6 cm (64\")   Wt (!) 158 kg (348 lb)   SpO2 97%   BMI 59.73 kg/m²       Physical Exam  Constitutional:       Appearance: Normal appearance. Patient is well-developed and normal weight.   HENT:      Head: Normocephalic.      Right Ear: Hearing and external ear normal.      Left Ear: Hearing and external ear normal.      Nose: Nose normal.   Eyes:      Conjunctiva/sclera: Conjunctivae normal.   Cardiovascular:      Rate and Rhythm: Normal rate.   Pulmonary:      Effort: Pulmonary effort is normal.      Breath sounds: No wheezing or rales.   Abdominal:      Palpations: Abdomen is soft.      Tenderness: There is no abdominal tenderness.   Musculoskeletal:      Cervical back: Normal range of motion.   Skin:     Findings: No rash.   Neurological:      Mental Status: Patient is alert and oriented to person, place, and time.   Psychiatric:         Mood and Affect: Mood and affect normal.         Judgment: Judgment normal.       Ortho Exam      LEFT SHOULDER: Good tone of deltoid, biceps, triceps, " wrist extensors, and wrist flexors.  Full shoulder range of motion. No swelling, skin discoloration or atrophy. Sensation grossly intact. Neurovascular intact.  Radial pulse 2+, ulnar pulse 2+. Full elbow motion. Good strength.       Procedures      Imaging Results (Most Recent)     Procedure Component Value Units Date/Time    XR Scapula Left [[id number]] Resulted: 08/09/22 1525     Updated: 08/09/22 1528           Result Review :     X-Ray Report:  Left Scapula X-Ray  Indication: Evaluation of left shoulder pain   AP and Lateral view(s)  Findings: Mild arthritis present. No acute fractures or dislocation.   Prior studies available for comparison: no       Assessment and Plan     Diagnoses and all orders for this visit:    1. Left shoulder pain, unspecified chronicity (Primary)  -     XR Scapula Left  -     methylPREDNISolone (MEDROL) 4 MG dose pack; Use as directed by package instructions  Dispense: 21 tablet; Refill: 0        Medrol dose pack prescribed alongside at home exercises. See how she does.     Call or return if worsening symptoms.    Follow Up     PRN.       Patient was given instructions and counseling regarding her condition or for health maintenance advice. Please see specific information pulled into the AVS if appropriate.     Scribed for Chad Todd MD by Marily Ricci.  08/10/22   12:23 EDT    I have personally performed the services described in this document as scribed by the above individual and it is both accurate and complete. Chad Todd MD 08/11/22

## 2022-08-11 ENCOUNTER — HOSPITAL ENCOUNTER (EMERGENCY)
Facility: HOSPITAL | Age: 62
Discharge: HOME | End: 2022-08-11
Attending: EMERGENCY MEDICINE | Admitting: EMERGENCY MEDICINE

## 2022-08-11 ENCOUNTER — APPOINTMENT (OUTPATIENT)
Dept: GENERAL RADIOLOGY | Facility: HOSPITAL | Age: 62
End: 2022-08-11

## 2022-08-11 VITALS
TEMPERATURE: 98 F | DIASTOLIC BLOOD PRESSURE: 80 MMHG | HEIGHT: 64 IN | BODY MASS INDEX: 50.02 KG/M2 | SYSTOLIC BLOOD PRESSURE: 128 MMHG | RESPIRATION RATE: 18 BRPM | OXYGEN SATURATION: 95 % | WEIGHT: 293 LBS | HEART RATE: 64 BPM

## 2022-08-11 DIAGNOSIS — M25.532 LEFT WRIST PAIN: Primary | ICD-10-CM

## 2022-08-11 DIAGNOSIS — M19.032 OSTEOARTHRITIS OF LEFT WRIST, UNSPECIFIED OSTEOARTHRITIS TYPE: ICD-10-CM

## 2022-08-11 LAB
ALBUMIN SERPL-MCNC: 3.9 G/DL (ref 3.5–5.2)
ALBUMIN/GLOB SERPL: 1.1 G/DL
ALP SERPL-CCNC: 71 U/L (ref 39–117)
ALT SERPL W P-5'-P-CCNC: 15 U/L (ref 1–33)
ANION GAP SERPL CALCULATED.3IONS-SCNC: 9.5 MMOL/L (ref 5–15)
AST SERPL-CCNC: 14 U/L (ref 1–32)
BASOPHILS # BLD AUTO: 0.03 10*3/MM3 (ref 0–0.2)
BASOPHILS NFR BLD AUTO: 0.5 % (ref 0–1.5)
BILIRUB SERPL-MCNC: <0.2 MG/DL (ref 0–1.2)
BUN SERPL-MCNC: 14 MG/DL (ref 8–23)
BUN/CREAT SERPL: 15.4 (ref 7–25)
CALCIUM SPEC-SCNC: 9.5 MG/DL (ref 8.6–10.5)
CHLORIDE SERPL-SCNC: 105 MMOL/L (ref 98–107)
CO2 SERPL-SCNC: 27.5 MMOL/L (ref 22–29)
CREAT SERPL-MCNC: 0.91 MG/DL (ref 0.57–1)
DEPRECATED RDW RBC AUTO: 42.3 FL (ref 37–54)
EGFRCR SERPLBLD CKD-EPI 2021: 71 ML/MIN/1.73
EOSINOPHIL # BLD AUTO: 0.11 10*3/MM3 (ref 0–0.4)
EOSINOPHIL NFR BLD AUTO: 1.9 % (ref 0.3–6.2)
ERYTHROCYTE [DISTWIDTH] IN BLOOD BY AUTOMATED COUNT: 12.7 % (ref 12.3–15.4)
GLOBULIN UR ELPH-MCNC: 3.7 GM/DL
GLUCOSE SERPL-MCNC: 104 MG/DL (ref 65–99)
HCT VFR BLD AUTO: 41 % (ref 34–46.6)
HGB BLD-MCNC: 13.3 G/DL (ref 12–15.9)
HOLD SPECIMEN: NORMAL
HOLD SPECIMEN: NORMAL
IMM GRANULOCYTES # BLD AUTO: 0 10*3/MM3 (ref 0–0.05)
IMM GRANULOCYTES NFR BLD AUTO: 0 % (ref 0–0.5)
LYMPHOCYTES # BLD AUTO: 2.07 10*3/MM3 (ref 0.7–3.1)
LYMPHOCYTES NFR BLD AUTO: 36.6 % (ref 19.6–45.3)
MCH RBC QN AUTO: 29.6 PG (ref 26.6–33)
MCHC RBC AUTO-ENTMCNC: 32.4 G/DL (ref 31.5–35.7)
MCV RBC AUTO: 91.1 FL (ref 79–97)
MONOCYTES # BLD AUTO: 0.74 10*3/MM3 (ref 0.1–0.9)
MONOCYTES NFR BLD AUTO: 13.1 % (ref 5–12)
NEUTROPHILS NFR BLD AUTO: 2.71 10*3/MM3 (ref 1.7–7)
NEUTROPHILS NFR BLD AUTO: 47.9 % (ref 42.7–76)
NRBC BLD AUTO-RTO: 0 /100 WBC (ref 0–0.2)
PLATELET # BLD AUTO: 211 10*3/MM3 (ref 140–450)
PMV BLD AUTO: 11.1 FL (ref 6–12)
POTASSIUM SERPL-SCNC: 4.3 MMOL/L (ref 3.5–5.2)
PROT SERPL-MCNC: 7.6 G/DL (ref 6–8.5)
RBC # BLD AUTO: 4.5 10*6/MM3 (ref 3.77–5.28)
SODIUM SERPL-SCNC: 142 MMOL/L (ref 136–145)
WBC NRBC COR # BLD: 5.66 10*3/MM3 (ref 3.4–10.8)
WHOLE BLOOD HOLD COAG: NORMAL
WHOLE BLOOD HOLD SPECIMEN: NORMAL

## 2022-08-11 PROCEDURE — 25010000002 ORPHENADRINE CITRATE PER 60 MG

## 2022-08-11 PROCEDURE — 73110 X-RAY EXAM OF WRIST: CPT

## 2022-08-11 PROCEDURE — 96372 THER/PROPH/DIAG INJ SC/IM: CPT

## 2022-08-11 PROCEDURE — 80053 COMPREHEN METABOLIC PANEL: CPT

## 2022-08-11 PROCEDURE — 25010000002 KETOROLAC TROMETHAMINE PER 15 MG

## 2022-08-11 PROCEDURE — 36415 COLL VENOUS BLD VENIPUNCTURE: CPT

## 2022-08-11 PROCEDURE — 99283 EMERGENCY DEPT VISIT LOW MDM: CPT

## 2022-08-11 PROCEDURE — 85025 COMPLETE CBC W/AUTO DIFF WBC: CPT

## 2022-08-11 RX ORDER — KETOROLAC TROMETHAMINE 30 MG/ML
30 INJECTION, SOLUTION INTRAMUSCULAR; INTRAVENOUS ONCE
Status: COMPLETED | OUTPATIENT
Start: 2022-08-11 | End: 2022-08-11

## 2022-08-11 RX ORDER — ORPHENADRINE CITRATE 30 MG/ML
60 INJECTION INTRAMUSCULAR; INTRAVENOUS ONCE
Status: COMPLETED | OUTPATIENT
Start: 2022-08-11 | End: 2022-08-11

## 2022-08-11 RX ORDER — KETOROLAC TROMETHAMINE 10 MG/1
10 TABLET, FILM COATED ORAL EVERY 6 HOURS PRN
Qty: 15 TABLET | Refills: 0 | OUTPATIENT
Start: 2022-08-11 | End: 2022-09-27

## 2022-08-11 RX ADMIN — KETOROLAC TROMETHAMINE 30 MG: 30 INJECTION, SOLUTION INTRAMUSCULAR; INTRAVENOUS at 21:51

## 2022-08-11 RX ADMIN — ORPHENADRINE CITRATE 60 MG: 30 INJECTION INTRAMUSCULAR; INTRAVENOUS at 21:54

## 2022-08-12 DIAGNOSIS — M25.512 LEFT SHOULDER PAIN, UNSPECIFIED CHRONICITY: ICD-10-CM

## 2022-08-12 RX ORDER — METHYLPREDNISOLONE 4 MG/1
TABLET ORAL
Qty: 21 TABLET | Refills: 0 | OUTPATIENT
Start: 2022-08-12

## 2022-08-12 NOTE — ED PROVIDER NOTES
Time: 9:05 PM EDT  Arrived by: private car  Chief Complaint: Left arm pain  History provided by: patient   History is limited by: N/A     History of Present Illness:  Patient is a 61 y.o. year old female who presents to the emergency department with left arm pain    Patient presents to the emergency department for complaint of left arm pain.  Patient states approximately 2 years ago she had a surgery for rotator cuff repair on the left arm and has had continued pain since that time.  Patient states she was seen by orthopedics 2 days ago and they did an x-ray of her shoulder and prescribed her steroids.  Patient states the pain has not improved since that time.  Patient states she has normal range of motion of the left shoulder, elbow and wrist.  Patient states she felt like she was developing some swelling of her left wrist since yesterday.  Patient denies any known injury, but states she did complete more housework yesterday than usual and is unsure if this is related to the increased pain that she is having in her left wrist.  Patient also states that approximately 4 years ago she had carpal tunnel repair on the left wrist.  No other complaints.      History provided by:  Patient   used: No    Arm Swelling  Location:  Wrist  Wrist location:  L wrist  Injury: no    Pain details:     Quality:  Aching  Prior injury to area:  No  Relieved by:  Heat  Worsened by:  Movement  Associated symptoms: swelling    Associated symptoms: no back pain, no decreased range of motion, no fatigue, no fever, no muscle weakness, no neck pain, no numbness, no stiffness and no tingling        Similar Symptoms Previously: Yes  Recently seen: Yes, patient was seen on 8/9/2022 by orthopedics for left arm pain and prescribed a medrol dose pack.      Patient Care Team  Primary Care Provider: Luz Corrales MD    Past Medical History:     No Known Allergies  Past Medical History:   Diagnosis Date   • Acid reflux    •  Arthritis    • Asthma    • Carpal tunnel syndrome    • Claustrophobia    • Eczema    • Essential hypertension 03/02/2015   • Foot pain, bilateral    • Hypertension    • Ingrowing nail 08/08/2018   • Limb swelling    • Lumbar back pain    • Migraine headache    • Obesity    • Plantar fascial fibromatosis of right foot 10/11/2018   • Pressure ulcer, stage 1    • Sleep apnea with use of continuous positive airway pressure (CPAP)    • Tinea unguium      Past Surgical History:   Procedure Laterality Date   • BREAST LUMPECTOMY Right 2011   • CARPAL TUNNEL RELEASE Right 03/06/2015, 9/15/17   • DILATATION AND CURETTAGE  1976   • HYSTERECTOMY  1983   • INTRAOCULAR LENS INSERTION       Family History   Problem Relation Age of Onset   • Arthritis Mother         Family history of certain chronic disabling diseases   • Osteoporosis Mother    • Cancer Father         unspecified   • Stroke Other         not specified   • Diabetes Other         unspecified type       Home Medications:  Prior to Admission medications    Medication Sig Start Date End Date Taking? Authorizing Provider   ammonium lactate (LAC-HYDRIN) 12 % lotion APPLY TOPICALLY TO AFFECTED AREA TWO TIMES A DAY AS NEEDED 10/14/21   Valentino Rizo MD   cyanocobalamin 1000 MCG/ML injection INJECT 1 ML INTRAMUSCULARLY EVERY 2 WEEKS 3/28/22   Valentino Rizo MD   Diclofenac Sodium (VOLTAREN) 1 % gel gel Apply  topically to the appropriate area as directed 4 (Four) Times a Day. 7/13/22   Mikel Prater APRN   gabapentin (NEURONTIN) 800 MG tablet Take 1 tablet by mouth 4 (Four) Times a Day As Needed (neuropathy). 2/4/22   Valentino Rizo MD   hydroCHLOROthiazide (HYDRODIURIL) 25 MG tablet Take 0.5 tablets by mouth Daily for 90 days. 6/13/22 9/11/22  Luz Corrales MD   HYDROcodone-acetaminophen (NORCO) 5-325 MG per tablet Take 1 tablet by mouth Every 6 (Six) Hours As Needed for Moderate Pain . 4/7/22   Luz Corrales MD   ibuprofen (ADVIL,MOTRIN) 800 MG tablet TAKE 1  "TABLET BY MOUTH THREE TIMES A DAY AS NEEDED 3/8/22   Valentino Rizo MD   losartan (COZAAR) 50 MG tablet TAKE 1 TABLET BY MOUTH EVERY DAY 2/4/22   Valentino Rizo MD   methocarbamol (ROBAXIN) 500 MG tablet TAKE 1 TABLET BY MOUTH THREE TIMES A DAY AS NEEDED FOR MUSCLE SPASMS 7/20/22   Luz Corrales MD   methylPREDNISolone (MEDROL) 4 MG dose pack Use as directed by package instructions 8/9/22   Chad Todd MD   vitamin D (ERGOCALCIFEROL) 1.25 MG (60547 UT) capsule capsule Take 1 capsule by mouth 1 (One) Time Per Week. 7/13/22   Mikel Prater APRN        Social History:   Social History     Tobacco Use   • Smoking status: Current Some Day Smoker     Types: Cigarettes   • Smokeless tobacco: Never Used   • Tobacco comment: smoked 21-30 years now 1-2 cigs a week   Vaping Use   • Vaping Use: Never used   Substance Use Topics   • Alcohol use: Yes     Comment: 3 - 4 drinkson Wed and Thursdays , has been drinking for 31 or more years   • Drug use: Never     Recent travel: no     Review of Systems:  Review of Systems   Constitutional: Negative for chills, fatigue and fever.   HENT: Negative for ear pain.    Eyes: Negative for pain.   Respiratory: Negative for cough and shortness of breath.    Cardiovascular: Negative for chest pain.   Gastrointestinal: Negative for abdominal pain, diarrhea, nausea and vomiting.   Genitourinary: Negative for dysuria.   Musculoskeletal: Negative for arthralgias, back pain, neck pain and stiffness.        Left arm pain and swelling   Skin: Negative for rash.   Neurological: Negative for headaches.        Physical Exam:  /80   Pulse 64   Temp 98 °F (36.7 °C) (Oral)   Resp 22   Ht 162.6 cm (64\")   Wt (!) 156 kg (344 lb 5.7 oz)   SpO2 95%   BMI 59.11 kg/m²     Physical Exam  Vitals and nursing note reviewed.   Constitutional:       General: She is not in acute distress.     Appearance: Normal appearance. She is obese. She is not ill-appearing or diaphoretic.   HENT:      Head: " Normocephalic and atraumatic.      Nose: Nose normal.   Eyes:      Extraocular Movements: Extraocular movements intact.      Conjunctiva/sclera: Conjunctivae normal.      Pupils: Pupils are equal, round, and reactive to light.   Cardiovascular:      Rate and Rhythm: Normal rate and regular rhythm.      Heart sounds: Normal heart sounds.   Pulmonary:      Effort: Pulmonary effort is normal.      Breath sounds: Normal breath sounds.   Musculoskeletal:         General: Normal range of motion.      Cervical back: Normal range of motion and neck supple.      Comments: Normal range of motion of the left shoulder and left wrist.  No tenderness palpation of left shoulder or left wrist.  There is mild swelling noted along the medial portion of the left wrist without surrounding erythema or ecchymosis.  Neurovascularly intact.   Skin:     General: Skin is warm and dry.   Neurological:      General: No focal deficit present.      Mental Status: She is alert and oriented to person, place, and time.   Psychiatric:         Mood and Affect: Mood normal.         Behavior: Behavior normal.         Thought Content: Thought content normal.         Judgment: Judgment normal.                Medications in the Emergency Department:  Medications   orphenadrine (NORFLEX) injection 60 mg (60 mg Intramuscular Given 8/11/22 2154)   ketorolac (TORADOL) injection 30 mg (30 mg Intramuscular Given 8/11/22 2151)        Labs  Lab Results (last 24 hours)     Procedure Component Value Units Date/Time    CBC & Differential [[id number]]  (Abnormal) Collected: 08/11/22 1945    Specimen: Blood Updated: 08/11/22 2022    Narrative:      The following orders were created for panel order CBC & Differential.  Procedure                               Abnormality         Status                     ---------                               -----------         ------                     CBC Auto Differential[[id number]]        Abnormal            Final result                  Please view results for these tests on the individual orders.    Comprehensive Metabolic Panel [[id number]]  (Abnormal) Collected: 08/11/22 1945    Specimen: Blood Updated: 08/11/22 2053     Glucose 104 mg/dL      BUN 14 mg/dL      Creatinine 0.91 mg/dL      Sodium 142 mmol/L      Potassium 4.3 mmol/L      Chloride 105 mmol/L      CO2 27.5 mmol/L      Calcium 9.5 mg/dL      Total Protein 7.6 g/dL      Albumin 3.90 g/dL      ALT (SGPT) 15 U/L      AST (SGOT) 14 U/L      Alkaline Phosphatase 71 U/L      Total Bilirubin <0.2 mg/dL      Globulin 3.7 gm/dL      A/G Ratio 1.1 g/dL      BUN/Creatinine Ratio 15.4     Anion Gap 9.5 mmol/L      eGFR 71.0 mL/min/1.73      Comment: National Kidney Foundation and American Society of Nephrology (ASN) Task Force recommended calculation based on the Chronic Kidney Disease Epidemiology Collaboration (CKD-EPI) equation refit without adjustment for race.       Narrative:      GFR Normal >60  Chronic Kidney Disease <60  Kidney Failure <15      CBC Auto Differential [[id number]]  (Abnormal) Collected: 08/11/22 1945    Specimen: Blood Updated: 08/11/22 2022     WBC 5.66 10*3/mm3      RBC 4.50 10*6/mm3      Hemoglobin 13.3 g/dL      Hematocrit 41.0 %      MCV 91.1 fL      MCH 29.6 pg      MCHC 32.4 g/dL      RDW 12.7 %      RDW-SD 42.3 fl      MPV 11.1 fL      Platelets 211 10*3/mm3      Neutrophil % 47.9 %      Lymphocyte % 36.6 %      Monocyte % 13.1 %      Eosinophil % 1.9 %      Basophil % 0.5 %      Immature Grans % 0.0 %      Neutrophils, Absolute 2.71 10*3/mm3      Lymphocytes, Absolute 2.07 10*3/mm3      Monocytes, Absolute 0.74 10*3/mm3      Eosinophils, Absolute 0.11 10*3/mm3      Basophils, Absolute 0.03 10*3/mm3      Immature Grans, Absolute 0.00 10*3/mm3      nRBC 0.0 /100 WBC            Imaging:  XR Wrist 3+ View Left    Result Date: 8/11/2022  PROCEDURE: XR WRIST 3+ VW LEFT  COMPARISON: 7/31/2015.  INDICATIONS: LEFT MEDIAL WRIST PAIN & SWELLING X2 WEEKS. NO KNOWN  INJURY.  FINDINGS: 3 views were obtained.  No acute fracture or acute malalignment is identified.  Mild to moderate degenerative changes involve the left wrist.  No definite subcutaneous emphysema or retained radiopaque foreign body.  If symptoms or clinical concerns persist, consider imaging follow-up.       No acute fracture or acute malalignment is identified.     COMMENT:  Part of this note is an electronic transcription of spoken language to printed text. The electronic translation/transcription may permit erroneous, or at times, nonsensical (or even sensical) words or phrases to be inadvertently transcribed or omitted; this  has reviewed the note for such errors (as well as additional errors); however, some may still exist.  SAAD LUCERO JR, MD       Electronically Signed and Approved By: SAAD LUCERO JR, MD on 8/11/2022 at 22:41                Procedures:  Procedures    Progress                            Medical Decision Making:  MDM  Number of Diagnoses or Management Options  Diagnosis management comments: I have spoken with patient. I have explained the patient´s condition, diagnoses and treatment plan based on the information available to me at this time. I have answered the patient's questions and addressed any concerns. The patient has a good  understanding of the patient´s diagnosis, condition, and treatment plan as can be expected at this point. The vital signs have been stable. The patient´s condition is stable and appropriate for discharge from the emergency department.      The patient will pursue further outpatient evaluation with the primary care physician or other designated or consulting physician as outlined in the discharge instructions. They are agreeable to this plan of care and follow-up instructions have been explained in detail. The patient has received these instructions in written format and have expressed an understanding of the discharge instructions. The patient is  aware that any significant change in condition or worsening of symptoms should prompt an immediate return to this or the closest emergency department or call to 911.       Amount and/or Complexity of Data Reviewed  Clinical lab tests: reviewed and ordered  Tests in the radiology section of CPT®: ordered and reviewed    Risk of Complications, Morbidity, and/or Mortality  Presenting problems: moderate  Diagnostic procedures: low  Management options: low    Patient Progress  Patient progress: stable       Final diagnoses:   Left wrist pain   Osteoarthritis of left wrist, unspecified osteoarthritis type        Disposition:  ED Disposition     ED Disposition   Discharge    Condition   Stable    Comment   --             This medical record created using voice recognition software.           Donny Smith PA-C  08/11/22 8903

## 2022-08-12 NOTE — DISCHARGE INSTRUCTIONS
Take Toradol as needed for pain control, but discontinue taking ibuprofen while you are taking this medication.  Once you run for Toradol you may return to taking ibuprofen as normal.  Apply heat to the area of pain 15 to 20 minutes at a time 4-5 times a day.  Follow-up with your primary care provider if not improved within 1 week.

## 2022-09-27 ENCOUNTER — OFFICE VISIT (OUTPATIENT)
Dept: ORTHOPEDIC SURGERY | Facility: CLINIC | Age: 62
End: 2022-09-27

## 2022-09-27 VITALS — HEIGHT: 64 IN | OXYGEN SATURATION: 97 % | BODY MASS INDEX: 50.02 KG/M2 | HEART RATE: 76 BPM | WEIGHT: 293 LBS

## 2022-09-27 DIAGNOSIS — M17.12 PRIMARY OSTEOARTHRITIS OF LEFT KNEE: Primary | ICD-10-CM

## 2022-09-27 PROCEDURE — 87591 N.GONORRHOEAE DNA AMP PROB: CPT | Performed by: NURSE PRACTITIONER

## 2022-09-27 PROCEDURE — 87510 GARDNER VAG DNA DIR PROBE: CPT | Performed by: NURSE PRACTITIONER

## 2022-09-27 PROCEDURE — 99213 OFFICE O/P EST LOW 20 MIN: CPT | Performed by: ORTHOPAEDIC SURGERY

## 2022-09-27 PROCEDURE — 87491 CHLMYD TRACH DNA AMP PROBE: CPT | Performed by: NURSE PRACTITIONER

## 2022-09-27 PROCEDURE — 87077 CULTURE AEROBIC IDENTIFY: CPT | Performed by: NURSE PRACTITIONER

## 2022-09-27 PROCEDURE — 87480 CANDIDA DNA DIR PROBE: CPT | Performed by: NURSE PRACTITIONER

## 2022-09-27 PROCEDURE — 87086 URINE CULTURE/COLONY COUNT: CPT | Performed by: NURSE PRACTITIONER

## 2022-09-27 PROCEDURE — 87186 SC STD MICRODIL/AGAR DIL: CPT | Performed by: NURSE PRACTITIONER

## 2022-09-27 PROCEDURE — 87660 TRICHOMONAS VAGIN DIR PROBE: CPT | Performed by: NURSE PRACTITIONER

## 2022-09-28 ENCOUNTER — TELEPHONE (OUTPATIENT)
Dept: URGENT CARE | Facility: CLINIC | Age: 62
End: 2022-09-28

## 2022-09-28 DIAGNOSIS — B96.89 BACTERIAL VAGINOSIS: Primary | ICD-10-CM

## 2022-09-28 DIAGNOSIS — N76.0 BACTERIAL VAGINOSIS: Primary | ICD-10-CM

## 2022-09-28 PROBLEM — M17.12 PRIMARY OSTEOARTHRITIS OF LEFT KNEE: Status: ACTIVE | Noted: 2022-09-28

## 2022-09-28 RX ORDER — METRONIDAZOLE 500 MG/1
500 TABLET ORAL 2 TIMES DAILY
Qty: 14 TABLET | Refills: 0 | Status: SHIPPED | OUTPATIENT
Start: 2022-09-28 | End: 2022-10-05

## 2022-09-28 NOTE — TELEPHONE ENCOUNTER
Spoke with patient regarding test results.  Discussed positive bacterial vaginosis requiring treatment with antibiotic.  Still awaiting urine culture.  Discouraged patient from any alcohol use during treatment.  Patient states understanding.  No further questions or concerns at this time.

## 2022-09-28 NOTE — PROGRESS NOTES
"Chief Complaint  Follow-up of the Left Knee     Subjective      Anita Estrada presents to [facility] for follow up evaluation of the left knee. The patient has been treating her left knee osteoarthritis conservatively. She has had previous gel injections in the past that have gave her relief. She states her osteoarthritis is increasing. She states her symptoms are getting worse. She takes NSAIDS and uses Voltaren gel.     No Known Allergies     Social History     Socioeconomic History   • Marital status:    Tobacco Use   • Smoking status: Current Some Day Smoker     Packs/day: 0.50     Types: Cigarettes   • Smokeless tobacco: Never Used   • Tobacco comment: smoking since age 11   Vaping Use   • Vaping Use: Every day   • Substances: Nicotine, Flavoring   • Devices: Disposable   Substance and Sexual Activity   • Alcohol use: Yes     Comment: social   • Drug use: Never   • Sexual activity: Yes     Partners: Male     Birth control/protection: Hysterectomy        Review of Systems     Objective   Vital Signs:   Pulse 76   Ht 162.6 cm (64\")   Wt (!) 156 kg (344 lb)   SpO2 97%   BMI 59.05 kg/m²       Physical Exam  Constitutional:       Appearance: Normal appearance. Patient is well-developed and normal weight.   HENT:      Head: Normocephalic.      Right Ear: Hearing and external ear normal.      Left Ear: Hearing and external ear normal.      Nose: Nose normal.   Eyes:      Conjunctiva/sclera: Conjunctivae normal.   Cardiovascular:      Rate and Rhythm: Normal rate.   Pulmonary:      Effort: Pulmonary effort is normal.      Breath sounds: No wheezing or rales.   Abdominal:      Palpations: Abdomen is soft.      Tenderness: There is no abdominal tenderness.   Musculoskeletal:      Cervical back: Normal range of motion.   Skin:     Findings: No rash.   Neurological:      Mental Status: Patient is alert and oriented to person, place, and time.   Psychiatric:         Mood and " Affect: Mood and affect normal.         Judgment: Judgment normal.       Ortho Exam      Left knee- joint line tenderness. Positive crepitus. Neurovascularly intact. Positive EHL, FHL, GS and TA. Sensation intact to all 5 nerves of the foot. Positive pulses ROM 0-110 degrees. Good strength to hamstrings, quadriceps, dorsiflexors, and plantar flexors.  Knee Extensor Mechanism  Intact.       Procedures      Imaging Results (Most Recent)     None           Result Review :       No results found.           Assessment and Plan     Diagnoses and all orders for this visit:    1. Primary osteoarthritis of left knee (Primary)        Discussed the treatment plan with the patient.  Plan to continue conservative treatment. She has responded well to gel injections in the past. Plan to seek approval for Visco injection for the left knee. The patient expressed understanding and wished to proceed.     Call or return if worsening symptoms.    Follow Up     For injection      Patient was given instructions and counseling regarding her condition or for health maintenance advice. Please see specific information pulled into the AVS if appropriate.     Scribed for Chad Todd MD by Genie Orta.  09/28/22   07:51 EDT    I have personally performed the services described in this document as scribed by the above individual and it is both accurate and complete. Chad Todd MD 09/29/22

## 2022-09-30 ENCOUNTER — TELEPHONE (OUTPATIENT)
Dept: URGENT CARE | Facility: CLINIC | Age: 62
End: 2022-09-30

## 2022-09-30 NOTE — TELEPHONE ENCOUNTER
----- Message from CHYNA Gonzáles sent at 9/30/2022  9:34 AM EDT -----  Please call the patient regarding her abnormal result. Urine culture shows E coli which is a common cause of UTI. Please complete antibiotic given at time of visit and follow up with primary  care provider if symptoms persist.

## 2022-10-01 ENCOUNTER — TELEPHONE (OUTPATIENT)
Dept: URGENT CARE | Facility: CLINIC | Age: 62
End: 2022-10-01

## 2022-10-04 ENCOUNTER — OFFICE VISIT (OUTPATIENT)
Dept: ORTHOPEDIC SURGERY | Facility: CLINIC | Age: 62
End: 2022-10-04

## 2022-10-04 VITALS — HEART RATE: 68 BPM | WEIGHT: 293 LBS | BODY MASS INDEX: 50.02 KG/M2 | OXYGEN SATURATION: 99 % | HEIGHT: 64 IN

## 2022-10-04 DIAGNOSIS — M17.12 PRIMARY OSTEOARTHRITIS OF LEFT KNEE: Primary | ICD-10-CM

## 2022-10-04 PROCEDURE — 20610 DRAIN/INJ JOINT/BURSA W/O US: CPT | Performed by: PHYSICIAN ASSISTANT

## 2022-10-04 NOTE — PROGRESS NOTES
"Chief Complaint  Pain and Follow-up of the Left Knee    Subjective      Anita Estrada presents to [facility] for follow-up of left knee pain and osteoarthritis, which she has managed conservatively with intermittent viscosupplementation.  She was recently seen in office on 9/27/2022 by Dr. Todd with plan to seek approval for Visco supplementation.  She presents today with insurance approval, requesting left knee injection.    Objective   No Known Allergies    Vital Signs:   Pulse 68   Ht 162.6 cm (64\")   Wt (!) 154 kg (340 lb)   SpO2 99%   BMI 58.36 kg/m²       Physical Exam    Constitutional: Awake, alert. Well nourished appearance.    Integumentary: Warm, dry, intact. No obvious rashes.    HENT: Atraumatic, normocephalic.   Respiratory: Non labored respirations .   Cardiovascular: Intact peripheral pulses.    Psychiatric: Normal mood and affect. A&O X3    Ortho Exam  Left knee: Tenderness to palpation of medial lateral joint line.  Crepitus with ROM.  Full knee extension and knee flexion to 110 degrees.  Full plantarflexion and dorsiflexion of the ankle.  Sensation is intact to light touch.  Distal neurovascular intact.  Mildly antalgic gait with use of cane.    Imaging Results (Most Recent)     None           Large Joint Arthrocentesis: L knee  Date/Time: 10/4/2022 8:47 AM  Consent given by: patient  Site marked: site marked  Timeout: Immediately prior to procedure a time out was called to verify the correct patient, procedure, equipment, support staff and site/side marked as required   Supporting Documentation  Indications: pain   Procedure Details  Location: knee - L knee  Needle gauge: 21g.  Medications administered: 88 mg Hyaluronan 88 MG/4ML  Patient tolerance: patient tolerated the procedure well with no immediate complications            Assessment and Plan   Problem List Items Addressed This Visit        Musculoskeletal and Injuries    Primary osteoarthritis of left " knee - Primary        Follow Up   Return in about 6 weeks (around 11/15/2022).    Patient Instructions   Left knee Monovisc injection administered today in office. Advised on duration between injections. Can consider steroid injection after 6 weeks, if needed.     Follow up in 6-8 weeks. Call with questions or concerns.       Patient was given instructions and counseling regarding her condition or for health maintenance advice. Please see specific information pulled into the AVS if appropriate.

## 2022-10-04 NOTE — PATIENT INSTRUCTIONS
Left knee Monovisc injection administered today in office. Advised on duration between injections. Can consider steroid injection after 6 weeks, if needed.     Follow up in 6-8 weeks. Call with questions or concerns.

## 2022-10-10 ENCOUNTER — OFFICE VISIT (OUTPATIENT)
Dept: PODIATRY | Facility: CLINIC | Age: 62
End: 2022-10-10

## 2022-10-10 VITALS
TEMPERATURE: 97.3 F | HEART RATE: 80 BPM | HEIGHT: 64 IN | WEIGHT: 293 LBS | OXYGEN SATURATION: 97 % | BODY MASS INDEX: 50.02 KG/M2 | SYSTOLIC BLOOD PRESSURE: 125 MMHG | DIASTOLIC BLOOD PRESSURE: 61 MMHG

## 2022-10-10 DIAGNOSIS — M79.672 FOOT PAIN, BILATERAL: ICD-10-CM

## 2022-10-10 DIAGNOSIS — L60.0 ONYCHOCRYPTOSIS: ICD-10-CM

## 2022-10-10 DIAGNOSIS — M79.671 FOOT PAIN, BILATERAL: ICD-10-CM

## 2022-10-10 DIAGNOSIS — B35.1 ONYCHOMYCOSIS: Primary | ICD-10-CM

## 2022-10-10 PROCEDURE — 11721 DEBRIDE NAIL 6 OR MORE: CPT | Performed by: PODIATRIST

## 2022-10-10 NOTE — PROGRESS NOTES
[facility] - PODIATRY    Today's Date: 10/10/22    Patient Name: Anita Estrada  MRN: [id number]  CSN: [id number]  PCP: Maris Thurman APRN, Last PCP Visit: 7 April 2022  Referring Provider: No ref. provider found    SUBJECTIVE     Chief Complaint   Patient presents with   • Left Foot - Nail Problem, Follow-up   • Right Foot - Follow-up, Nail Problem     HPI: Anita Estrada, a 61 y.o.female, comes to clinic.    New, Established, New Problem:  established   Location:  Toenails  Duration:   Greater than five years  Onset:  Gradual  Nature:  sore with palpation.  Stable, worsening, improving:   stable  Aggravating factors:  Pain with shoe gear and ambulation.  Previous Treatment:  debridement    Medical changes: none    Patient denies any fevers, chills, nausea, vomiting, shortness of breath, nor any other constitutional signs nor symptoms.       Past Medical History:   Diagnosis Date   • Acid reflux    • Arthritis    • Asthma    • Carpal tunnel syndrome    • Claustrophobia    • Eczema    • Essential hypertension 03/02/2015   • Foot pain, bilateral    • Hypertension    • Ingrowing nail 08/08/2018   • Limb swelling    • Lumbar back pain    • Migraine headache    • Obesity    • Plantar fascial fibromatosis of right foot 10/11/2018   • Pressure ulcer, stage 1    • Sleep apnea with use of continuous positive airway pressure (CPAP)    • Tinea unguium      Past Surgical History:   Procedure Laterality Date   • BREAST LUMPECTOMY Right 2011   • CARPAL TUNNEL RELEASE Right 03/06/2015, 9/15/17   • DILATATION AND CURETTAGE  1976   • HYSTERECTOMY  1983   • INTRAOCULAR LENS INSERTION       Family History   Problem Relation Age of Onset   • Arthritis Mother         Family history of certain chronic disabling diseases   • Osteoporosis Mother    • Cancer Father         unspecified   • Stroke Other         not specified   • Diabetes Other         unspecified type     Social History     Socioeconomic History   •  Marital status:    Tobacco Use   • Smoking status: Some Days     Packs/day: 0.50     Types: Cigarettes   • Smokeless tobacco: Never   • Tobacco comments:     smoking since age 11   Vaping Use   • Vaping Use: Every day   • Substances: Nicotine, Flavoring   • Devices: Disposable   Substance and Sexual Activity   • Alcohol use: Yes     Comment: social   • Drug use: Never   • Sexual activity: Yes     Partners: Male     Birth control/protection: Hysterectomy     No Known Allergies  Current Outpatient Medications   Medication Sig Dispense Refill   • ammonium lactate (LAC-HYDRIN) 12 % lotion APPLY TOPICALLY TO AFFECTED AREA TWO TIMES A DAY AS NEEDED 226 g 10   • Diclofenac Sodium (VOLTAREN) 1 % gel gel Apply  topically to the appropriate area as directed 4 (Four) Times a Day. 100 g 3   • gabapentin (NEURONTIN) 800 MG tablet Take 1 tablet by mouth 4 (Four) Times a Day As Needed (neuropathy). 120 tablet 1   • ibuprofen (ADVIL,MOTRIN) 800 MG tablet TAKE 1 TABLET BY MOUTH THREE TIMES A DAY AS NEEDED 90 tablet 0   • losartan (COZAAR) 50 MG tablet TAKE 1 TABLET BY MOUTH EVERY DAY 30 tablet 6   • methocarbamol (ROBAXIN) 500 MG tablet TAKE 1 TABLET BY MOUTH THREE TIMES A DAY AS NEEDED FOR MUSCLE SPASMS 30 tablet 1   • vitamin D (ERGOCALCIFEROL) 1.25 MG (78491 UT) capsule capsule Take 1 capsule by mouth 1 (One) Time Per Week. 12 capsule 1     Current Facility-Administered Medications   Medication Dose Route Frequency Provider Last Rate Last Admin   • cyanocobalamin injection 1,000 mcg  1,000 mcg Intramuscular Q28 Days Valentino Rizo MD   1,000 mcg at 11/22/21 1146   • cyanocobalamin injection 1,000 mcg  1,000 mcg Intramuscular Q28 Days Valentino Rizo MD   1,000 mcg at 02/28/22 1127     Review of Systems   Constitutional: Negative.    Skin:        Painful toenails.   All other systems reviewed and are negative.      OBJECTIVE     Vitals:    10/10/22 1624   BP: 125/61   Pulse: 80   Temp: 97.3 °F (36.3 °C)   SpO2: 97%        Patient seen in no apparent distress.      PHYSICAL EXAM:     Foot/Ankle Exam:       General:   Appearance: obesity    Orientation: AAOx3    Affect: appropriate    Assistance: cane    Shoe Gear:  Sandals    VASCULAR      Right Foot Vascularity   Normal vascular exam    Dorsalis pedis:  2+  Posterior tibial:  2+  Skin Temperature: warm    Edema Grading:  None  CFT:  < 3 seconds  Pedal Hair Growth:  Present  Varicosities: none       Left Foot Vascularity   Normal vascular exam    Dorsalis pedis:  2+  Posterior tibial:  2+  Skin Temperature: warm    Edema Gradin+ and non-pitting  CFT:  < 3 seconds  Pedal Hair Growth:  Present  Varicosities: none        NEUROLOGIC     Right Foot Neurologic   Normal sensation    Light touch sensation:  Normal  Vibratory sensation:  Normal  Hot/Cold sensation: normal    Protective Sensation using [place] Monofilament:  10     Left Foot Neurologic   Normal sensation    Light touch sensation:  Normal  Vibratory sensation:  Normal  Hot/cold sensation: normal    Protective Sensation using [place] Monofilament:  10     MUSCLE STRENGTH     Right Foot Muscle Strength   Foot dorsiflexion:  4  Foot plantar flexion:  4  Foot inversion:  4  Foot eversion:  4     Left Foot Muscle Strength   Foot dorsiflexion:  4  Foot plantar flexion:  4  Foot inversion:  4  Foot eversion:  4     RANGE OF MOTION      Right Foot Range of Motion   Foot and ankle ROM within normal limits       Left Foot Range of Motion   Foot and ankle ROM within normal limits       DERMATOLOGIC     Right Foot Dermatologic   Skin: skin intact    Nails: onychomycosis, abnormally thick, subungual debris, dystrophic nails and ingrown toenail    Nails comment:  Toenails 1, 2, 3, 4     Left Foot Dermatologic   Skin: skin intact    Nails: onychomycosis, abnormally thick, subungual debris, dystrophic nails and ingrown toenail    Nails comment:  Toenails 1, 2, 3, 4, and 5    ASSESSMENT/PLAN     Diagnoses and all  orders for this visit:    1. Onychomycosis (Primary)    2. Foot pain, bilateral    3. Onychocryptosis        Comprehensive lower extremity examination and evaluation was performed.    Discussed findings and treatment plan including risks, benefits, and treatment options with patient in detail. Patient agreed with treatment plan.    Toenails 1 through 5 on left and toenails 1 through 4 on right were debrided in thickness and length and then smoothed with a Dremel Tool.  Tolerated the procedure well without complications.    An After Visit Summary was printed and given to the patient at discharge, including (if requested) any available informative/educational handouts regarding diagnosis, treatment, or medications. All questions were answered to patient/family satisfaction. Should symptoms fail to improve or worsen they agree to call or return to clinic or to go to the Emergency Department. Discussed the importance of following up with any needed screening tests/labs/specialist appointments and any requested follow-up recommended by me today. Importance of maintaining follow-up discussed and patient accepts that missed appointments can delay diagnosis and potentially lead to worsening of conditions.    Return in about 9 weeks (around 12/12/2022) for Toenail Care., or sooner if acute issues arise.    This document has been electronically signed by Ranulfo Hiuzar DPM on October 10, 2022 16:41 EDT

## 2022-10-13 ENCOUNTER — OFFICE VISIT (OUTPATIENT)
Dept: FAMILY MEDICINE CLINIC | Facility: CLINIC | Age: 62
End: 2022-10-13

## 2022-10-13 VITALS
WEIGHT: 293 LBS | BODY MASS INDEX: 50.02 KG/M2 | DIASTOLIC BLOOD PRESSURE: 82 MMHG | TEMPERATURE: 98 F | HEIGHT: 64 IN | HEART RATE: 72 BPM | OXYGEN SATURATION: 96 % | SYSTOLIC BLOOD PRESSURE: 144 MMHG | RESPIRATION RATE: 17 BRPM

## 2022-10-13 DIAGNOSIS — I10 ESSENTIAL HYPERTENSION: ICD-10-CM

## 2022-10-13 DIAGNOSIS — E66.01 CLASS 3 SEVERE OBESITY DUE TO EXCESS CALORIES WITH SERIOUS COMORBIDITY AND BODY MASS INDEX (BMI) OF 50.0 TO 59.9 IN ADULT: ICD-10-CM

## 2022-10-13 DIAGNOSIS — Z90.711 HISTORY OF PARTIAL HYSTERECTOMY: ICD-10-CM

## 2022-10-13 DIAGNOSIS — G62.9 POLYNEUROPATHY: ICD-10-CM

## 2022-10-13 DIAGNOSIS — R60.0 LOWER EXTREMITY EDEMA: ICD-10-CM

## 2022-10-13 DIAGNOSIS — M19.90 ARTHRITIS: ICD-10-CM

## 2022-10-13 DIAGNOSIS — M51.36 DDD (DEGENERATIVE DISC DISEASE), LUMBAR: ICD-10-CM

## 2022-10-13 DIAGNOSIS — G57.93 NEUROPATHY INVOLVING BOTH LOWER EXTREMITIES: ICD-10-CM

## 2022-10-13 DIAGNOSIS — Z76.89 ESTABLISHING CARE WITH NEW DOCTOR, ENCOUNTER FOR: Primary | ICD-10-CM

## 2022-10-13 DIAGNOSIS — F33.41 RECURRENT MAJOR DEPRESSIVE DISORDER, IN PARTIAL REMISSION: ICD-10-CM

## 2022-10-13 DIAGNOSIS — Z79.899 ENCOUNTER FOR MEDICATION MANAGEMENT: ICD-10-CM

## 2022-10-13 PROCEDURE — 80305 DRUG TEST PRSMV DIR OPT OBS: CPT

## 2022-10-13 PROCEDURE — 99214 OFFICE O/P EST MOD 30 MIN: CPT

## 2022-10-13 RX ORDER — LOSARTAN POTASSIUM 50 MG/1
50 TABLET ORAL DAILY
Qty: 90 TABLET | Refills: 1 | Status: SHIPPED | OUTPATIENT
Start: 2022-10-13 | End: 2023-01-23 | Stop reason: SDUPTHER

## 2022-10-13 RX ORDER — GABAPENTIN 800 MG/1
800 TABLET ORAL 3 TIMES DAILY
Qty: 90 TABLET | Refills: 2 | Status: SHIPPED | OUTPATIENT
Start: 2022-10-13 | End: 2022-11-10 | Stop reason: SDUPTHER

## 2022-10-13 RX ORDER — PHENTERMINE HYDROCHLORIDE 37.5 MG/1
37.5 CAPSULE ORAL EVERY MORNING
Qty: 30 CAPSULE | Refills: 0 | Status: SHIPPED | OUTPATIENT
Start: 2022-10-13 | End: 2022-11-10 | Stop reason: SDUPTHER

## 2022-10-13 RX ORDER — HYDROCHLOROTHIAZIDE 25 MG/1
25 TABLET ORAL DAILY
Qty: 90 TABLET | Refills: 1 | Status: SHIPPED | OUTPATIENT
Start: 2022-10-13 | End: 2023-01-23 | Stop reason: SDUPTHER

## 2022-10-13 NOTE — ASSESSMENT & PLAN NOTE
Slightly less controlled today, will continue on losartan, will add hydrochlorothiazide 25 mg to not only help with blood pressure but will also help with the lower extremity edema that she experiences as well.  Discussed diet changes, decrease salt intake, increasing exercise.

## 2022-10-13 NOTE — ASSESSMENT & PLAN NOTE
Hoping that with weight loss, we can provide additional benefit with gaining better control of her arthritis pains.  For now, continue on ibuprofen as needed for acute bouts of pain.

## 2022-10-13 NOTE — ASSESSMENT & PLAN NOTE
Discussed diet changes, increasing exercise, will also start patient on phentermine.  We will do this for about 3 months, give patient a little start.  Also discussed with her risks/side effects of this medication, discussed use, UDS/consent obtained, Elvin reviewed.

## 2022-10-13 NOTE — PROGRESS NOTES
Anita Estrada presents to [facility] with complaints of issues with weight loss, lower extremity swelling, who presents to the clinic also establish as a new patient.      History of Present Illness  This is a 61-year-old female, past medical history significant for depression, degenerative disc disease in her lower back with associated neuropathy, arthritis, hypertension, bilateral carpal tunnel surgery, partial hysterectomy, left shoulder repair, and breast biopsy, who presents to clinic today with complaints of issues with weight loss and lower extremity swelling.    Depression: Patient states that she had a really rough year last year, states that she lost her mother and then about a month later she lost her daughter.  States that she did go through a pretty extensive depression, was on different medications at that time, but was able to come off of these recently.  Patient states that she is doing well, is trying to cope with her depression in other ways, does not feel like she needs medication at this time.  Denies suicidal thoughts or ideation.    Degenerative disc disease: States that she does have issues with her lower back, states that she is always had issues with her lower back, knows that a lot of it is also related to weight issues.  States that she is also got pain going down from her left hip all the way down into her left foot.  She states that she is got the neuropathy type pain there as well.  Has been on gabapentin for some time in regards to this, does need a refill of this medication.  Also states that she knows that if she is able to lose some weight, that her hip will feel better as well as her lower back.  Has seen pain management in the past, has been on different medications in the past as well, but states that she is trying her best to manage this with gabapentin and ibuprofen.    Hypertension: States that she does have high blood pressure, states  that she is been on losartan for some time, and is typically doing pretty well on it.  Does admit to lower extremity swelling, was at 1 time on a water pill, but they did take her off of this.  States that she wonders if she needs something else for further blood pressure, states that her blood pressure was doing much better whenever she had lost quite a bit of weight previously.  Is hoping to get back to that point.    Obesity: States that she has a hard time losing weight, states that she was previously on Ozempic, lost about 30 to 40 pounds while on this medication.  States that she was on it doing well, that her insurance decided not to cover it any longer.  Patient states that she is never tried anything else for weight loss, knows that her insurance limits her pretty severely on what she can try.  Would like something to just help her get started, has not made diet adjustments, is no longer drinking sodas, has cut back significantly on her candy intake, and is trying to do better as well.  Would like something just to help give her a jumpstart.  Also thinks if she can lose weight, her joint pain and arthritis pain will improve as well.    Mammogram due in April, has had partial hysterectomy no longer gets Pap smears.  Colonoscopy due in 2030.  Refuses vaccines/immunizations, otherwise is up-to-date on preventative screenings.    Past Medical History:   Diagnosis Date   • Acid reflux    • Arthritis    • Asthma    • Carpal tunnel syndrome    • Claustrophobia    • Eczema    • Essential hypertension 03/02/2015   • Foot pain, bilateral    • Hypertension    • Ingrowing nail 08/08/2018   • Limb swelling    • Lumbar back pain    • Migraine headache    • Obesity    • Plantar fascial fibromatosis of right foot 10/11/2018   • Pressure ulcer, stage 1    • Sleep apnea with use of continuous positive airway pressure (CPAP)    • Tinea unguium      Past Surgical History:   • BREAST LUMPECTOMY   • CARPAL TUNNEL RELEASE   •  "DILATATION AND CURETTAGE   • HYSTERECTOMY   • INTRAOCULAR LENS INSERTION       Social History     Socioeconomic History   • Marital status:    Tobacco Use   • Smoking status: Former     Packs/day: 0.50     Years: 20.00     Pack years: 10.00     Types: Cigarettes     Quit date:      Years since quittin.7   • Smokeless tobacco: Never   • Tobacco comments:     smoking since age 11   Vaping Use   • Vaping Use: Every day   • Substances: Nicotine, Flavoring   • Devices: Disposable   Substance and Sexual Activity   • Alcohol use: Yes     Comment: social   • Drug use: Never   • Sexual activity: Yes     Partners: Male     Birth control/protection: Hysterectomy       Family History   Problem Relation Age of Onset   • Arthritis Mother         Family history of certain chronic disabling diseases   • Osteoporosis Mother    • Cancer Father         unspecified   • Stroke Other         not specified   • Diabetes Other         unspecified type         Objective   Vital Signs:   /82   Pulse 72   Temp 98 °F (36.7 °C)   Resp 17   Ht 162.6 cm (64.02\")   Wt (!) 155 kg (340 lb 14.4 oz)   SpO2 96%   BMI 58.48 kg/m²     Body mass index is 58.48 kg/m².    All labs, imaging, test results, and specialty provider notes reviewed with patient.       Physical Exam  Vitals reviewed.   Constitutional:       Appearance: Normal appearance.   Cardiovascular:      Rate and Rhythm: Normal rate and regular rhythm.      Pulses: Normal pulses.      Heart sounds: Normal heart sounds.   Pulmonary:      Effort: Pulmonary effort is normal.      Breath sounds: Normal breath sounds.   Neurological:      General: No focal deficit present.      Mental Status: She is alert and oriented to person, place, and time.            Assessment and Plan:  Diagnoses and all orders for this visit:    1. Establishing care with new doctor, encounter for (Primary)    2. Encounter for medication management  -     POC Urine Drug Screen Premier " [facility]    3. Essential hypertension  Assessment & Plan:  Slightly less controlled today, will continue on losartan, will add hydrochlorothiazide 25 mg to not only help with blood pressure but will also help with the lower extremity edema that she experiences as well.  Discussed diet changes, decrease salt intake, increasing exercise.    Orders:  -     hydroCHLOROthiazide (HYDRODIURIL) 25 MG tablet; Take 1 tablet by mouth Daily.  Dispense: 90 tablet; Refill: 1  -     losartan (COZAAR) 50 MG tablet; Take 1 tablet by mouth Daily.  Dispense: 90 tablet; Refill: 1    4. Class 3 severe obesity due to excess calories with serious comorbidity and body mass index (BMI) of 50.0 to 59.9 in adult ([facility])  Assessment & Plan:  Discussed diet changes, increasing exercise, will also start patient on phentermine.  We will do this for about 3 months, give patient a little start.  Also discussed with her risks/side effects of this medication, discussed use, UDS/consent obtained, Elvin reviewed.    Orders:  -     phentermine 37.5 MG capsule; Take 1 capsule by mouth Every Morning.  Dispense: 30 capsule; Refill: 0    5. Recurrent major depressive disorder, in partial remission ([facility])    6. DDD (degenerative disc disease), lumbar    7. Arthritis  Assessment & Plan:  Hoping that with weight loss, we can provide additional benefit with gaining better control of her arthritis pains.  For now, continue on ibuprofen as needed for acute bouts of pain.      8. History of partial hysterectomy    9. Lower extremity edema    10. Neuropathy involving both lower extremities  Comments:  Continue with gabapentin.  UDS/consent obtained, Elvin reviewed.    11. Polyneuropathy  -     gabapentin (NEURONTIN) 800 MG tablet; Take 1 tablet by mouth 3 (Three) Times a Day.  Dispense: 90 tablet; Refill: 2        Follow Up:  Return in about 4 weeks (around 11/10/2022) for Video visit.    Patient was given instructions and counseling regarding her condition or for  health maintenance advice. Please see specific information pulled into the AVS if appropriate.

## 2022-11-09 DIAGNOSIS — E66.01 CLASS 3 SEVERE OBESITY DUE TO EXCESS CALORIES WITH SERIOUS COMORBIDITY AND BODY MASS INDEX (BMI) OF 50.0 TO 59.9 IN ADULT: ICD-10-CM

## 2022-11-09 RX ORDER — PHENTERMINE HYDROCHLORIDE 37.5 MG/1
37.5 CAPSULE ORAL EVERY MORNING
Qty: 30 CAPSULE | Refills: 0 | OUTPATIENT
Start: 2022-11-09

## 2022-11-10 ENCOUNTER — TELEMEDICINE (OUTPATIENT)
Dept: FAMILY MEDICINE CLINIC | Facility: CLINIC | Age: 62
End: 2022-11-10

## 2022-11-10 VITALS — BODY MASS INDEX: 56.47 KG/M2 | WEIGHT: 293 LBS

## 2022-11-10 DIAGNOSIS — M62.838 MUSCLE SPASM: ICD-10-CM

## 2022-11-10 DIAGNOSIS — G62.9 POLYNEUROPATHY: ICD-10-CM

## 2022-11-10 DIAGNOSIS — E66.01 CLASS 3 SEVERE OBESITY DUE TO EXCESS CALORIES WITH SERIOUS COMORBIDITY AND BODY MASS INDEX (BMI) OF 50.0 TO 59.9 IN ADULT: Primary | ICD-10-CM

## 2022-11-10 PROCEDURE — 99213 OFFICE O/P EST LOW 20 MIN: CPT

## 2022-11-10 RX ORDER — PHENTERMINE HYDROCHLORIDE 37.5 MG/1
37.5 CAPSULE ORAL EVERY MORNING
Qty: 30 CAPSULE | Refills: 0 | Status: SHIPPED | OUTPATIENT
Start: 2022-11-10 | End: 2023-01-23

## 2022-11-10 RX ORDER — METHOCARBAMOL 500 MG/1
500 TABLET, FILM COATED ORAL 3 TIMES DAILY PRN
Qty: 90 TABLET | Refills: 1 | Status: SHIPPED | OUTPATIENT
Start: 2022-11-10 | End: 2023-01-06

## 2022-11-10 RX ORDER — GABAPENTIN 800 MG/1
800 TABLET ORAL 4 TIMES DAILY
Qty: 120 TABLET | Refills: 2 | Status: SHIPPED | OUTPATIENT
Start: 2022-11-10 | End: 2023-02-06

## 2022-11-10 NOTE — ASSESSMENT & PLAN NOTE
Doing well on phentermine, will continue.  Discussed side effects of medication, uses of medication, and to continue with diet changes and increasing exercise as well.  UDS/consent reviewed, Elvin reviewed.

## 2022-11-10 NOTE — PROGRESS NOTES
Anita Estrada presents to [facility] who presents for 1 month follow-up.    You have chosen to receive care through a telephone visit. Do you consent to use a telephone visit for your medical care today? YES    Patient is present in her home alone, I am present in the office in a patient room.  This video was conducted via Zoom.    This was an audio and video enabled telemedicine encounter.     History of Present Illness  This is a 61-year-old female who presents via telehealth for 1 month follow-up.    Obesity: Patient was placed on phentermine at last visit, states that she is doing really well on this medication.  Is actually lost about 11 pounds.  States that she feels well, not having any side effects, states that it has given her some energy, and that she is able to get on a better diet and exercise routine as well.  Would like to continue on, states that she is had a lot of success over the first month.    Patient also needs a refill of her muscle relaxer, and the dosage of gabapentin that she has been on for several years has actually been 800 mg 4 times a day, and I sent in for her to be 3 times a day.    The following portions of the patient's history were personally reviewed and updated as appropriate: allergies, current medications, past medical history, past surgical history, past family history, and past social history.       Objective     All labs, imaging, test results, and specialty provider notes reviewed with patient.     Physical Exam  Vitals reviewed.   Constitutional:       Appearance: Normal appearance.   Neurological:      General: No focal deficit present.      Mental Status: She is alert and oriented to person, place, and time.              Assessment and Plan:  Diagnoses and all orders for this visit:    1. Class 3 severe obesity due to excess calories with serious comorbidity and body mass index (BMI) of 50.0 to 59.9 in adult ([facility]) (Primary)  Assessment  & Plan:  Doing well on phentermine, will continue.  Discussed side effects of medication, uses of medication, and to continue with diet changes and increasing exercise as well.  UDS/consent reviewed, Elvin reviewed.    Orders:  -     phentermine 37.5 MG capsule; Take 1 capsule by mouth Every Morning.  Dispense: 30 capsule; Refill: 0    2. Muscle spasm  Comments:  Refill Robaxin.  Orders:  -     methocarbamol (ROBAXIN) 500 MG tablet; Take 1 tablet by mouth 3 (Three) Times a Day As Needed for Muscle Spasms.  Dispense: 90 tablet; Refill: 1    3. Polyneuropathy  Comments:  Adjust gabapentin to 800 mg 4 times daily, UDS/consent reviewed, Elvin reviewed.  Orders:  -     gabapentin (NEURONTIN) 800 MG tablet; Take 1 tablet by mouth 4 (Four) Times a Day.  Dispense: 120 tablet; Refill: 2        Follow Up:  Return in about 4 weeks (around 12/8/2022).    Patient was given instructions and counseling regarding her condition or for health maintenance advice. Please see specific information pulled into the AVS if appropriate.     Total Time Spent: 15 minutes

## 2022-11-15 ENCOUNTER — OFFICE VISIT (OUTPATIENT)
Dept: ORTHOPEDIC SURGERY | Facility: CLINIC | Age: 62
End: 2022-11-15

## 2022-11-15 VITALS — WEIGHT: 293 LBS | HEIGHT: 64 IN | BODY MASS INDEX: 50.02 KG/M2

## 2022-11-15 DIAGNOSIS — M17.12 PRIMARY OSTEOARTHRITIS OF LEFT KNEE: Primary | ICD-10-CM

## 2022-11-15 PROCEDURE — 99213 OFFICE O/P EST LOW 20 MIN: CPT | Performed by: PHYSICIAN ASSISTANT

## 2022-11-15 NOTE — PATIENT INSTRUCTIONS
Patient is doing well. She had approximately 75% improvement with Monovisc, although reports better relief with Synvisc in the past. She does not feel she needs L knee steroid injection today.     Follow as needed. Can have repeat steroid injection, when needed.

## 2022-11-15 NOTE — PROGRESS NOTES
"Chief Complaint  Follow-up of the Left Knee    Subjective      Anita Estrada presents to [facility] for follow-up of left knee pain and osteoarthritis, which she has managed conservatively with intermittent injections in the past.  She was seen in office on 10/4/2022 and received left knee Monovisc injection.  Patient presents today for follow-up with assistance of cane for ambulation.  She is reporting approximately 75% improvement and that she is \"doing pretty well\".  Does report that she previously had better relief with Synvisc injection.    Objective   No Known Allergies    Vital Signs:   Ht 162.6 cm (64\")   Wt (!) 149 kg (329 lb)   BMI 56.47 kg/m²       Physical Exam    Constitutional: Awake, alert. Well nourished appearance.    Integumentary: Warm, dry, intact. No obvious rashes.    HENT: Atraumatic, normocephalic.   Respiratory: Non labored respirations .   Cardiovascular: Intact peripheral pulses.    Psychiatric: Normal mood and affect. A&O X3    Ortho Exam  Left knee: Mild edema.  Crepitus with ROM.  Full knee extension knee flexion 115 degrees.  Full plantarflexion and dorsiflexion of the ankle.  Sensation intact light touch.  Distal neurovascular intact.  Nonantalgic gait with use of cane.    Imaging Results (Most Recent)     None            Assessment and Plan   Problem List Items Addressed This Visit        Musculoskeletal and Injuries    Primary osteoarthritis of left knee - Primary     Follow Up   Return if symptoms worsen or fail to improve.  Educated on risk of smoking. Discussed options for smoking cessation.    Patient Instructions   Patient is doing well. She had approximately 75% improvement with Monovisc, although reports better relief with Synvisc in the past. She does not feel she needs L knee steroid injection today.     Follow as needed. Can have repeat steroid injection, when needed.     Patient was given instructions and counseling regarding her " condition or for health maintenance advice. Please see specific information pulled into the AVS if appropriate.

## 2022-11-28 RX ORDER — IBUPROFEN 800 MG/1
TABLET ORAL
Qty: 90 TABLET | Refills: 0 | Status: SHIPPED | OUTPATIENT
Start: 2022-11-28 | End: 2022-12-27

## 2022-12-04 ENCOUNTER — APPOINTMENT (OUTPATIENT)
Dept: CARDIOLOGY | Facility: HOSPITAL | Age: 62
End: 2022-12-04

## 2022-12-04 ENCOUNTER — HOSPITAL ENCOUNTER (EMERGENCY)
Facility: HOSPITAL | Age: 62
Discharge: HOME | End: 2022-12-04
Attending: EMERGENCY MEDICINE | Admitting: EMERGENCY MEDICINE

## 2022-12-04 VITALS
HEART RATE: 88 BPM | HEIGHT: 66 IN | OXYGEN SATURATION: 100 % | DIASTOLIC BLOOD PRESSURE: 70 MMHG | BODY MASS INDEX: 47.09 KG/M2 | RESPIRATION RATE: 16 BRPM | WEIGHT: 293 LBS | TEMPERATURE: 97.5 F | SYSTOLIC BLOOD PRESSURE: 140 MMHG

## 2022-12-04 DIAGNOSIS — M79.605 PAIN OF LEFT LOWER EXTREMITY: Primary | ICD-10-CM

## 2022-12-04 PROCEDURE — 25010000002 KETOROLAC TROMETHAMINE PER 15 MG: Performed by: PHYSICIAN ASSISTANT

## 2022-12-04 PROCEDURE — 96372 THER/PROPH/DIAG INJ SC/IM: CPT

## 2022-12-04 PROCEDURE — 99282 EMERGENCY DEPT VISIT SF MDM: CPT

## 2022-12-04 PROCEDURE — 93971 EXTREMITY STUDY: CPT

## 2022-12-04 PROCEDURE — 93971 EXTREMITY STUDY: CPT | Performed by: SURGERY

## 2022-12-04 RX ORDER — KETOROLAC TROMETHAMINE 30 MG/ML
30 INJECTION, SOLUTION INTRAMUSCULAR; INTRAVENOUS ONCE
Status: COMPLETED | OUTPATIENT
Start: 2022-12-04 | End: 2022-12-04

## 2022-12-04 RX ORDER — HYDROCODONE BITARTRATE AND ACETAMINOPHEN 5; 325 MG/1; MG/1
1 TABLET ORAL EVERY 6 HOURS PRN
Qty: 12 TABLET | Refills: 0 | Status: SHIPPED | OUTPATIENT
Start: 2022-12-04 | End: 2022-12-07

## 2022-12-04 RX ADMIN — KETOROLAC TROMETHAMINE 30 MG: 30 INJECTION, SOLUTION INTRAMUSCULAR; INTRAVENOUS at 14:56

## 2022-12-04 NOTE — ED PROVIDER NOTES
Subjective     History provided by:  Patient   used: No        The patient is a 62-year-old female who presents emergency department with chief complaint of left leg pain.  States she has chronic knee pain however pain is different today.  No traumatic injury.  It is not going into the calf.  Does have hurt a little bit in the thigh.  Unsure if this coming from her back.  Was worried about a blood clot.  Has not taken anything for the pain.  She is on gabapentin, Voltaren gel and a muscle relaxant.  No relief.  She denies any bowel or bladder incontinence, saddle anesthesia, numbness or weakness. She denies dysuria, significant back pain (has some at baseline), fevers, chest pain, palpitations. It is worse with laying.     Review of Systems   Constitutional: Negative for chills and fever.   HENT: Negative.  Negative for congestion and sore throat.    Respiratory: Negative for cough, chest tightness and shortness of breath.    Cardiovascular: Positive for leg swelling (at baselin). Negative for chest pain and palpitations.   Gastrointestinal: Negative for abdominal pain, diarrhea, nausea and vomiting.        Negative for bowel incontinence   Genitourinary: Negative for dysuria, flank pain and hematuria.        Negative for bladder incontinence   Musculoskeletal: Positive for back pain (chronic) and myalgias.   Skin: Negative for rash.   Neurological: Negative for weakness, numbness and headaches.        Negative for saddle anesthesia   All other systems reviewed and are negative.      Past Medical History:   Diagnosis Date   • Acid reflux    • Arthritis    • Asthma    • Carpal tunnel syndrome    • Claustrophobia    • Eczema    • Essential hypertension 03/02/2015   • Foot pain, bilateral    • Hypertension    • Ingrowing nail 08/08/2018   • Limb swelling    • Lumbar back pain    • Migraine headache    • Obesity    • Plantar fascial fibromatosis of right foot 10/11/2018   • Pressure ulcer, stage 1     • Sleep apnea with use of continuous positive airway pressure (CPAP)    • Tinea unguium        No Known Allergies    Past Surgical History:   Procedure Laterality Date   • BREAST LUMPECTOMY Right    • CARPAL TUNNEL RELEASE Right 2015, 9/15/17   • DILATATION AND CURETTAGE     • HYSTERECTOMY     • INTRAOCULAR LENS INSERTION         Family History   Problem Relation Age of Onset   • Arthritis Mother         Family history of certain chronic disabling diseases   • Osteoporosis Mother    • Cancer Father         unspecified   • Stroke Other         not specified   • Diabetes Other         unspecified type       Social History     Socioeconomic History   • Marital status:    Tobacco Use   • Smoking status: Former     Packs/day: 0.50     Years: 20.00     Pack years: 10.00     Types: Cigarettes     Quit date:      Years since quittin.9   • Smokeless tobacco: Never   • Tobacco comments:     smoking since age 11   Vaping Use   • Vaping Use: Every day   • Substances: Nicotine, Flavoring   • Devices: Disposable   Substance and Sexual Activity   • Alcohol use: Yes     Comment: social   • Drug use: Never   • Sexual activity: Yes     Partners: Male     Birth control/protection: Hysterectomy           Objective   Physical Exam  Vitals and nursing note reviewed.   Constitutional:       Appearance: Normal appearance. She is not ill-appearing or toxic-appearing.   HENT:      Head: Normocephalic.      Nose: Nose normal.      Mouth/Throat:      Mouth: Mucous membranes are moist.   Eyes:      Conjunctiva/sclera: Conjunctivae normal.   Cardiovascular:      Rate and Rhythm: Normal rate and regular rhythm.   Pulmonary:      Effort: Pulmonary effort is normal.      Breath sounds: Normal breath sounds.   Musculoskeletal:         General: Normal range of motion.      Cervical back: Normal range of motion.      Left knee: No deformity, effusion or bony tenderness. Normal range of motion. Normal patellar  mobility.      Right lower leg: Edema present.      Left lower leg: Edema present.   Skin:     General: Skin is warm and dry.      Capillary Refill: Capillary refill takes less than 2 seconds.   Neurological:      Mental Status: She is alert.         Procedures           ED Course                                           MDM     62-year-old female presents to ED for leg knee pain.  Denies any significant back pain, questions if this could be radicular.  Is currently on gabapentin and Flexeril.  Denies any saddle anesthesia, numbness or weakness, bowel or bladder incontinence.  No midline tenderness.  Has chronic left knee pain at baseline.  Is worried could have a blood clot.  No traumatic injury.  She is neurovascular intact.  DP and PT pulses 2+.  Do not suspect clinically an arterial occlusion.    Ultrasound duplex is negative for DVT.    Discussed rice.  Toradol given IM in the ED.  Still having pain.  Rx for pain control.  Follow-up with PCP.  Return precaution discussed.    I have spoken with the patiennt. I have explained the patient´s condition, diagnoses and treatment plan based on the information available to me at this time. I have answered the patient questions and addressed any concerns. The patient has a good  understanding of the patient´s diagnosis, condition, and treatment plan as can be expected at this point. The vital signs have been stable. The patient´s condition is stable and appropriate for discharge from the emergency department.      The patient will pursue further outpatient evaluation with the primary care physician or other designated or consulting physician as outlined in the discharge instructions. They are agreeable to this plan of care and follow-up instructions have been explained in detail. The patient has received these instructions in written format and have expressed an understanding of the discharge instructions. The patient is aware that any significant change in condition or  worsening of symptoms should prompt an immediate return to this or the closest emergency department or call to 911.     No orders to display        Final diagnoses:   Pain of left lower extremity       ED Disposition  ED Disposition     ED Disposition   Discharge    Condition   Stable    Comment   --             Maris Thurman, APRN  [address] [address]  [phone]    In 1 week  if symptoms persist    [facility] EMERGENCY ROOM  [address]  [facility] [address] [phone]    If symptoms worsen         Medication List      New Prescriptions    HYDROcodone-acetaminophen 5-325 MG per tablet  Commonly known as: NORCO  Take 1 tablet by mouth Every 6 (Six) Hours As Needed for Moderate Pain for up to 3 days.           Where to Get Your Medications      These medications were sent to [facility]/pharmacy #[id number] - [place], [place] - [address] - [phone]  - [phone] FX  [address] [place] [address]    Hours: 24-hours Phone: [phone]   · HYDROcodone-acetaminophen 5-325 MG per tablet          Fareed Lacy PA-C  12/04/22 4288

## 2022-12-05 ENCOUNTER — OFFICE VISIT (OUTPATIENT)
Dept: SLEEP MEDICINE | Facility: HOSPITAL | Age: 62
End: 2022-12-05

## 2022-12-05 VITALS
WEIGHT: 293 LBS | HEIGHT: 66 IN | BODY MASS INDEX: 47.09 KG/M2 | HEART RATE: 80 BPM | SYSTOLIC BLOOD PRESSURE: 141 MMHG | DIASTOLIC BLOOD PRESSURE: 79 MMHG | OXYGEN SATURATION: 100 %

## 2022-12-05 DIAGNOSIS — Z99.89 OSA ON CPAP: Primary | ICD-10-CM

## 2022-12-05 DIAGNOSIS — G47.33 OSA ON CPAP: Primary | ICD-10-CM

## 2022-12-05 DIAGNOSIS — E66.01 CLASS 3 SEVERE OBESITY DUE TO EXCESS CALORIES WITHOUT SERIOUS COMORBIDITY WITH BODY MASS INDEX (BMI) OF 50.0 TO 59.9 IN ADULT: ICD-10-CM

## 2022-12-05 PROBLEM — E66.813 CLASS 3 SEVERE OBESITY DUE TO EXCESS CALORIES WITHOUT SERIOUS COMORBIDITY WITH BODY MASS INDEX (BMI) OF 50.0 TO 59.9 IN ADULT: Status: ACTIVE | Noted: 2017-10-05

## 2022-12-05 LAB
BH CV LOWER VASCULAR LEFT COMMON FEMORAL AUGMENT: NORMAL
BH CV LOWER VASCULAR LEFT COMMON FEMORAL COMPETENT: NORMAL
BH CV LOWER VASCULAR LEFT COMMON FEMORAL COMPRESS: NORMAL
BH CV LOWER VASCULAR LEFT COMMON FEMORAL PHASIC: NORMAL
BH CV LOWER VASCULAR LEFT COMMON FEMORAL SPONT: NORMAL
BH CV LOWER VASCULAR LEFT DISTAL FEMORAL COMPRESS: NORMAL
BH CV LOWER VASCULAR LEFT GASTRONEMIUS COMPRESS: NORMAL
BH CV LOWER VASCULAR LEFT GREATER SAPH AK COMPRESS: NORMAL
BH CV LOWER VASCULAR LEFT GREATER SAPH BK COMPRESS: NORMAL
BH CV LOWER VASCULAR LEFT LESSER SAPH COMPRESS: NORMAL
BH CV LOWER VASCULAR LEFT MID FEMORAL AUGMENT: NORMAL
BH CV LOWER VASCULAR LEFT MID FEMORAL COMPETENT: NORMAL
BH CV LOWER VASCULAR LEFT MID FEMORAL COMPRESS: NORMAL
BH CV LOWER VASCULAR LEFT MID FEMORAL PHASIC: NORMAL
BH CV LOWER VASCULAR LEFT MID FEMORAL SPONT: NORMAL
BH CV LOWER VASCULAR LEFT PERONEAL COMPRESS: NORMAL
BH CV LOWER VASCULAR LEFT POPLITEAL AUGMENT: NORMAL
BH CV LOWER VASCULAR LEFT POPLITEAL COMPETENT: NORMAL
BH CV LOWER VASCULAR LEFT POPLITEAL COMPRESS: NORMAL
BH CV LOWER VASCULAR LEFT POPLITEAL PHASIC: NORMAL
BH CV LOWER VASCULAR LEFT POPLITEAL SPONT: NORMAL
BH CV LOWER VASCULAR LEFT POSTERIOR TIBIAL COMPRESS: NORMAL
BH CV LOWER VASCULAR LEFT PROXIMAL FEMORAL COMPRESS: NORMAL
BH CV LOWER VASCULAR LEFT SAPHENOFEMORAL JUNCTION COMPRESS: NORMAL
BH CV LOWER VASCULAR RIGHT COMMON FEMORAL AUGMENT: NORMAL
BH CV LOWER VASCULAR RIGHT COMMON FEMORAL COMPETENT: NORMAL
BH CV LOWER VASCULAR RIGHT COMMON FEMORAL COMPRESS: NORMAL
BH CV LOWER VASCULAR RIGHT COMMON FEMORAL PHASIC: NORMAL
BH CV LOWER VASCULAR RIGHT COMMON FEMORAL SPONT: NORMAL
BH CV VAS PRELIMINARY FINDINGS SCRIPTING: 1
MAXIMAL PREDICTED HEART RATE: 156 BPM
STRESS TARGET HR: 133 BPM

## 2022-12-05 PROCEDURE — G0463 HOSPITAL OUTPT CLINIC VISIT: HCPCS

## 2022-12-05 PROCEDURE — 99213 OFFICE O/P EST LOW 20 MIN: CPT | Performed by: INTERNAL MEDICINE

## 2022-12-05 RX ORDER — FLUTICASONE PROPIONATE 50 MCG
1 SPRAY, SUSPENSION (ML) NASAL DAILY
Qty: 18.2 ML | Refills: 10 | Status: SHIPPED | OUTPATIENT
Start: 2022-12-05

## 2022-12-05 NOTE — PROGRESS NOTES
"  [address] [address]  Phone: [phone]  Fax: [phone]      SLEEP CLINIC FOLLOW UP PROGRESS NOTE.    Anita Estrada  [id number]   1960  62 y.o.  female      PCP: Maris Thurman APRN      Date of visit: 12/5/2022    Chief Complaint   Patient presents with   • Sleep Apnea   • Obesity       HPI:  This is a 62 y.o. years old patient is here for the management of obstructive sleep apnea.  Sleep apnea is moderate in severity with a AHI of 24.5/hr. Patient is using positive airway pressure therapy with CPAP 9 and the symptoms of snoring, non-restorative sleep and daytime excessive sleepiness have improved significantly on the therapy. Normally goes to bed at 11 PM and wakes up at 10 AM.  The patient wakes up 0-1 time(s) during the night and has no problem going back to sleep.  Feels refreshed after waking up.  Patient also denies headaches and nasal congestion.  At present she is not working    Medications and allergies are reviewed by me and documented in the encounter.     SOCIAL (habits pertaining to sleep medicine)  History tobacco use:Yes   History of alcohol use: 0 per week  Caffeine use: 0     REVIEW OF SYSTEMS:   [place] Sleepiness Scale :Total score: 8   Nasal congestion:No   Dry mouth/nose:No   Post nasal drip; No   Acid reflux/Heartburn:No   Abd bloating:No   Morning headache:No   Anxiety:No   Depression:No    PHYSICAL EXAMINATION:  CONSTITUTIONAL:  Vitals:    12/05/22 1100   BP: 141/79   Pulse: 80   SpO2: 100%   Weight: (!) 155 kg (341 lb 3.2 oz)   Height: 167.6 cm (66\")    Body mass index is 55.07 kg/m².   NOSE: nasal passages are clear, No deformities noted   RESP SYSTEM: Not in any respiratory distress, no chest deformities noted,   CARDIOVASULAR: No edema noted  NEURO: Oriented x 3, gait normal,  Mood and affect appeared appropriate      Data reviewed:  The Smart card downloaded on 12/5/2022 has been reviewed independently by me " for compliance and discussed the data with the patient.   Compliance; 97%  More than 4 hr use, 90%  Average use of the device 6 hours and 2-minute per night  Residual AHI: 1.0 /hr (goal < 5.0 /hr)  Mask type: Nasal pillows  Device: DreamStation 2  DME: [employer]      ASSESSMENT AND PLAN:  · Obstructive sleep apnea ( G 47.33).  The symptoms of sleep apnea have improved with the device and the treatment.  Patient's compliance with the device is excellent for treatment of sleep apnea.  I have independently reviewed the smart card down load and discussed with the patient the download data and encouarged the patient to continue to use the device.The residual AHI is acceptable. The device is benefiting the patient and the device is medically necessary.  Without proper control of sleep apnea and good compliance there is a increased risk for hypertension, diabetes mellitus and nonrestorative sleep with hypersomnia which can increase risk for motor vehicle accidents.  Untreated sleep apnea is also a risk factor for development of atrial fibrillation, pulmonary hypertension and stroke. The patient is also instructed to get the supplies from the [facility] and and change them on a regular basis.  A prescription for supplies has been sent to the [facility].  I have also discussed the good sleep hygiene habits and adequate amount of sleep needed for good health.  · Obesity  3 with BMI is Body mass index is 55.07 kg/m².. I have discuss the relationship between the weight and sleep apnea. The benefit of weight loss in reducing severity of sleep apnea was discussed. Discussed diet and exercise with the patient to achieve ideal BMI.   · Return in about 1 year (around 12/5/2023) for with smart card down load. . Patient's questions were answered.      John Maldonado MD  Sleep Medicine.  Medical Director, [facility] sleep [facility]  12/5/2022 ,

## 2022-12-07 DIAGNOSIS — E66.01 CLASS 3 SEVERE OBESITY DUE TO EXCESS CALORIES WITH SERIOUS COMORBIDITY AND BODY MASS INDEX (BMI) OF 50.0 TO 59.9 IN ADULT: ICD-10-CM

## 2022-12-07 RX ORDER — PHENTERMINE HYDROCHLORIDE 37.5 MG/1
37.5 CAPSULE ORAL EVERY MORNING
Qty: 30 CAPSULE | Refills: 0 | OUTPATIENT
Start: 2022-12-07

## 2022-12-20 DIAGNOSIS — M17.12 OSTEOARTHRITIS OF LEFT KNEE, UNSPECIFIED OSTEOARTHRITIS TYPE: ICD-10-CM

## 2022-12-21 ENCOUNTER — TELEPHONE (OUTPATIENT)
Dept: SLEEP MEDICINE | Facility: HOSPITAL | Age: 62
End: 2022-12-21

## 2022-12-21 NOTE — TELEPHONE ENCOUNTER
Patient called in regard to phone calls from Alesha stating her order was not signed by physician, attempted to connect to 3 way call and Alesha hung up. Gave her local number to call and name of  to get resolution.

## 2022-12-27 RX ORDER — IBUPROFEN 800 MG/1
TABLET ORAL
Qty: 90 TABLET | Refills: 0 | Status: SHIPPED | OUTPATIENT
Start: 2022-12-27 | End: 2023-01-23

## 2023-01-06 DIAGNOSIS — M62.838 MUSCLE SPASM: ICD-10-CM

## 2023-01-06 RX ORDER — METHOCARBAMOL 500 MG/1
TABLET, FILM COATED ORAL
Qty: 90 TABLET | Refills: 0 | Status: SHIPPED | OUTPATIENT
Start: 2023-01-06 | End: 2023-02-02

## 2023-01-10 ENCOUNTER — HOSPITAL ENCOUNTER (EMERGENCY)
Facility: HOSPITAL | Age: 63
Discharge: HOME | End: 2023-01-10
Attending: EMERGENCY MEDICINE | Admitting: EMERGENCY MEDICINE
Payer: MEDICAID

## 2023-01-10 VITALS
HEIGHT: 66 IN | RESPIRATION RATE: 20 BRPM | HEART RATE: 79 BPM | WEIGHT: 293 LBS | TEMPERATURE: 98 F | BODY MASS INDEX: 47.09 KG/M2 | OXYGEN SATURATION: 100 % | SYSTOLIC BLOOD PRESSURE: 145 MMHG | DIASTOLIC BLOOD PRESSURE: 76 MMHG

## 2023-01-10 DIAGNOSIS — R30.0 DYSURIA: Primary | ICD-10-CM

## 2023-01-10 DIAGNOSIS — H92.01 ACUTE OTALGIA, RIGHT: ICD-10-CM

## 2023-01-10 DIAGNOSIS — J02.9 PHARYNGITIS, UNSPECIFIED ETIOLOGY: ICD-10-CM

## 2023-01-10 LAB
FLUAV AG NPH QL: NEGATIVE
FLUBV AG NPH QL IA: NEGATIVE
S PYO AG THROAT QL: NEGATIVE

## 2023-01-10 PROCEDURE — 25010000002 CEFTRIAXONE PER 250 MG: Performed by: NURSE PRACTITIONER

## 2023-01-10 PROCEDURE — 99283 EMERGENCY DEPT VISIT LOW MDM: CPT

## 2023-01-10 PROCEDURE — 87081 CULTURE SCREEN ONLY: CPT | Performed by: EMERGENCY MEDICINE

## 2023-01-10 PROCEDURE — 87880 STREP A ASSAY W/OPTIC: CPT | Performed by: EMERGENCY MEDICINE

## 2023-01-10 PROCEDURE — 0 LIDOCAINE 1 % SOLUTION 10 ML VIAL: Performed by: NURSE PRACTITIONER

## 2023-01-10 PROCEDURE — 96372 THER/PROPH/DIAG INJ SC/IM: CPT

## 2023-01-10 PROCEDURE — 87804 INFLUENZA ASSAY W/OPTIC: CPT

## 2023-01-10 PROCEDURE — C9803 HOPD COVID-19 SPEC COLLECT: HCPCS | Performed by: EMERGENCY MEDICINE

## 2023-01-10 PROCEDURE — U0004 COV-19 TEST NON-CDC HGH THRU: HCPCS

## 2023-01-10 RX ORDER — CEPHALEXIN 500 MG/1
500 CAPSULE ORAL 2 TIMES DAILY
Qty: 20 CAPSULE | Refills: 0 | Status: SHIPPED | OUTPATIENT
Start: 2023-01-10 | End: 2023-01-23

## 2023-01-10 RX ADMIN — LIDOCAINE HYDROCHLORIDE 1 G: 10 INJECTION, SOLUTION INFILTRATION; PERINEURAL at 19:08

## 2023-01-10 NOTE — ED PROVIDER NOTES
Time: 6:17 PM EST  Date of encounter:  1/10/2023  Independent Historian/Clinical History and Information was obtained by:   Patient  Chief Complaint   Patient presents with   • Sore Throat   • Earache     R ear ache and R side of throat hurts x 7 days. Pt also reports it hurts at end of urination, lower L back hurts, 2 days       History is limited by: N/A    History of Present Illness:  Patient is a 62 y.o. year old female who presents to the emergency department for evaluation of right-sided ear pain, right-sided sore throat, dysuria and urgency, and low back pain.  Denies fever, chills, nausea or vomiting.  Denies a history of kidney stones.    HPI    Patient Care Team  Primary Care Provider: Maris Thurman APRN    Past Medical History:     No Known Allergies  Past Medical History:   Diagnosis Date   • Acid reflux    • Arthritis    • Asthma    • Carpal tunnel syndrome    • Claustrophobia    • Eczema    • Essential hypertension 03/02/2015   • Foot pain, bilateral    • Hypertension    • Ingrowing nail 08/08/2018   • Limb swelling    • Lumbar back pain    • Migraine headache    • Obesity    • Plantar fascial fibromatosis of right foot 10/11/2018   • Pressure ulcer, stage 1    • Sleep apnea with use of continuous positive airway pressure (CPAP)    • Tinea unguium      Past Surgical History:   Procedure Laterality Date   • BREAST LUMPECTOMY Right 2011   • CARPAL TUNNEL RELEASE Right 03/06/2015, 9/15/17   • DILATATION AND CURETTAGE  1976   • HYSTERECTOMY  1983   • INTRAOCULAR LENS INSERTION       Family History   Problem Relation Age of Onset   • Arthritis Mother         Family history of certain chronic disabling diseases   • Osteoporosis Mother    • Cancer Father         unspecified   • Stroke Other         not specified   • Diabetes Other         unspecified type       Home Medications:  Prior to Admission medications    Medication Sig Start Date End Date Taking? Authorizing Provider   benzonatate (Tessalon  Perles) 100 MG capsule Take 1 capsule by mouth 3 (Three) Times a Day As Needed for Cough. 22   Ritchie Hall MD   Diclofenac Sodium (VOLTAREN) 1 % gel gel Apply  topically to the appropriate area as directed 4 (Four) Times a Day. 22   Chad Todd MD   fluticasone (Flonase) 50 MCG/ACT nasal spray 1 spray into the nostril(s) as directed by provider Daily. 22   John Maldonado MD   gabapentin (NEURONTIN) 800 MG tablet Take 1 tablet by mouth 4 (Four) Times a Day. 11/10/22   Maris Thurman APRN   hydroCHLOROthiazide (HYDRODIURIL) 25 MG tablet Take 1 tablet by mouth Daily. 10/13/22   Maris Thurman APRN   ibuprofen (ADVIL,MOTRIN) 800 MG tablet TAKE 1 TABLET BY MOUTH THREE TIMES A DAY AS NEEDED 22   Maris Thurman APRN   losartan (COZAAR) 50 MG tablet Take 1 tablet by mouth Daily. 10/13/22   Maris Thurman APRN   methocarbamol (ROBAXIN) 500 MG tablet TAKE 1 TABLET BY MOUTH THREE TIMES A DAY AS NEEDED FOR MUSCLE SPASMS 23   Maris Thurman APRN   phentermine 37.5 MG capsule Take 1 capsule by mouth Every Morning. 11/10/22   Maris Thurman APRN        Social History:   Social History     Tobacco Use   • Smoking status: Former     Packs/day: 0.50     Years: 20.00     Pack years: 10.00     Types: Cigarettes     Quit date:      Years since quittin.0   • Smokeless tobacco: Never   • Tobacco comments:     smoking since age 11   Vaping Use   • Vaping Use: Every day   • Substances: Nicotine, Flavoring   • Devices: Disposable   Substance Use Topics   • Alcohol use: Yes     Comment: social   • Drug use: Never         Review of Systems:  Review of Systems   Constitutional: Negative for chills and fever.   HENT: Positive for ear pain and sore throat.    Gastrointestinal: Negative for nausea and vomiting.   Genitourinary: Positive for dysuria and frequency.   Musculoskeletal: Positive for back pain.   All other systems reviewed and are negative.    "    Physical Exam:  /76   Pulse 79   Temp 98 °F (36.7 °C)   Resp 20   Ht 167.6 cm (66\")   Wt (!) 159 kg (350 lb 1.5 oz)   SpO2 100%   BMI 56.51 kg/m²     Physical Exam  Vitals and nursing note reviewed.   Constitutional:       General: She is not in acute distress.     Appearance: She is well-developed. She is not ill-appearing, toxic-appearing or diaphoretic.   HENT:      Head: Normocephalic and atraumatic.      Right Ear: No drainage, swelling or tenderness. A middle ear effusion is present. Tympanic membrane is not erythematous.      Left Ear: Tympanic membrane and ear canal normal.      Ears:      Comments: Fluid on right TM     Nose: No congestion or rhinorrhea.      Mouth/Throat:      Mouth: Mucous membranes are moist. No oral lesions.      Pharynx: No pharyngeal swelling, oropharyngeal exudate, posterior oropharyngeal erythema or uvula swelling.      Tonsils: No tonsillar exudate or tonsillar abscesses.   Eyes:      Pupils: Pupils are equal, round, and reactive to light.   Cardiovascular:      Rate and Rhythm: Normal rate and regular rhythm.      Heart sounds: Normal heart sounds.   Pulmonary:      Effort: Pulmonary effort is normal.      Breath sounds: Normal breath sounds.   Abdominal:      General: Bowel sounds are normal. There is no distension.      Palpations: Abdomen is soft.   Musculoskeletal:      Cervical back: Normal range of motion and neck supple.   Skin:     General: Skin is warm and dry.      Capillary Refill: Capillary refill takes less than 2 seconds.   Neurological:      General: No focal deficit present.      Mental Status: She is alert and oriented to person, place, and time.   Psychiatric:         Mood and Affect: Mood normal.         Behavior: Behavior normal.                  Procedures:  Procedures      Medical Decision Making:      Comorbidities that affect care:    Hypertension    External Notes reviewed:    None      The following orders were placed and all results were " independently analyzed by me:  Orders Placed This Encounter   Procedures   • Influenza Antigen, Rapid - Swab, Nasopharynx   • COVID-19,APTIMA PANTHER(OLEG), ADRIANA/ BERNABE, NP/OP SWAB IN UTM/VTM/SALINE TRANSPORT MEDIA,24 HR TAT - Swab, Nasal Cavity   • Rapid Strep A Screen - Swab, Throat   • Beta Strep Culture, Throat - Swab, Throat       Medications Given in the Emergency Department:  Medications   cefTRIAXone (ROCEPHIN) 350 mg/ml in lidocaine 1% IM syringe (1 gm vial) (has no administration in time range)        ED Course:    The patient was initially evaluated in the triage area where orders were placed. The patient was later dispositioned by CHYNA Hughes.      The patient was advised to stay for completion of workup which includes but is not limited to communication of labs and radiological results, reassessment and plan. The patient was advised that leaving prior to disposition by a provider could result in critical findings that are not communicated to the patient.     ED Course as of 01/10/23 1859   Tue Robbie 10, 2023   1819 --- PROVIDER IN TRIAGE NOTE ---    The patient was evaluated my meTheresa in triage. Orders were placed and the patient is currently awaiting disposition.    [AJ]      ED Course User Index  [AJ] Theresa Cordero PA-C       Labs:    Lab Results (last 24 hours)     Procedure Component Value Units Date/Time    Influenza Antigen, Rapid - Swab, Nasopharynx [[id number]]  (Normal) Collected: 01/10/23 1826    Specimen: Swab from Nasopharynx Updated: 01/10/23 1858     Influenza A Ag, EIA Negative     Influenza B Ag, EIA Negative    COVID-19,APTIMA PANTHER(OLEG), ADRIANA/BH BERNABE, NP/OP SWAB IN UTM/VTM/SALINE TRANSPORT MEDIA,24 HR TAT - Swab, Nasal Cavity [[id number]] Collected: 01/10/23 1826    Specimen: Swab from Nasal Cavity Updated: 01/10/23 1830    Rapid Strep A Screen - Swab, Throat [[id number]]  (Normal) Collected: 01/10/23 1826    Specimen: Swab from Throat Updated: 01/10/23  1856     Strep A Ag Negative    Beta Strep Culture, Throat - Swab, Throat [[id number]] Collected: 01/10/23 1826    Specimen: Swab from Throat Updated: 01/10/23 1856           Imaging:    No Radiology Exams Resulted Within Past 24 Hours      Differential Diagnosis and Discussion:      Dysuria: Differential diagnosis includes but is not limited to urethritis, cystitis, pyelonephritis, ureteral calculi, neoplasm, chemical irritant, urethral stricture, and trauma    All labs were reviewed and analyzed by me.    MDM  Number of Diagnoses or Management Options  Acute otalgia, right  Dysuria  Pharyngitis, unspecified etiology  Diagnosis management comments: Seen and assessed patient as noted.  Vitals stable, no acute distress, afebrile.       Treating pt with Keflex to cover her right otalgia, pharyngitis, UTI.    I told patient she does not need to provide a urinalysis since she has history of UTIs and this feels just like a UTI.  Educated her on worrisome symptoms to return for and she verbalized understanding.    Flu and strep neg.  Covid pending.         Amount and/or Complexity of Data Reviewed  Clinical lab tests: ordered and reviewed    Risk of Complications, Morbidity, and/or Mortality  Presenting problems: low  Diagnostic procedures: low  Management options: low    Patient Progress  Patient progress: stable           Patient Care Considerations:    UA but pt has had a UTI before and reports this is what it feels like again.      Consultants/Shared Management Plan:    None    Social Determinants of Health:    Patient is independent, reliable, and has access to care.       Disposition and Care Coordination:    Discharged: The patient is suitable and stable for discharge with no need for consideration of observation or admission.      I have explained discharge medications and the need for follow up with the patient/caretakers. This was also printed in the discharge instructions. Patient was discharged with the  following medications and follow up:      Medication List      New Prescriptions    cephalexin 500 MG capsule  Commonly known as: KEFLEX  Take 1 capsule by mouth 2 (Two) Times a Day.           Where to Get Your Medications      These medications were sent to [facility]/pharmacy #[id number] - [place], [place] - [address] - [phone]  - [phone] FX  [address] [place] [address]    Hours: 24-hours Phone: [phone]   · cephalexin 500 MG capsule      [facility] EMERGENCY ROOM  [address] [facility] [address] [phone]  Go to   If symptoms worsen       Final diagnoses:   Dysuria   Pharyngitis, unspecified etiology   Acute otalgia, right        ED Disposition     ED Disposition   Discharge    Condition   Stable    Comment   --             This medical record created using voice recognition software.           Larisa Rodriguez, APRN  01/10/23 1853       Larisa Rodriguez, APRN  01/10/23 1855       Larisa Rodriguez, APRN  01/10/23 1856       Larisa Rodriguez, APRN  01/10/23 1858       Larisa Rordiguez, APRN  01/10/23 1859

## 2023-01-11 ENCOUNTER — TELEPHONE (OUTPATIENT)
Dept: CASE MANAGEMENT | Facility: OTHER | Age: 63
End: 2023-01-11
Payer: MEDICAID

## 2023-01-11 LAB — SARS-COV-2 RNA PNL SPEC NAA+PROBE: NOT DETECTED

## 2023-01-11 NOTE — TELEPHONE ENCOUNTER
Patient identified as potential CCM patient from ER data. Spoke with patient and she said she knows what she needs to do, just doing it can be difficult.   Made follow-up appointment with PCP.  Silvia Kaye RN  Ambulatory Case Management    1/11/2023, 16:57 EST

## 2023-01-12 LAB — BACTERIA SPEC AEROBE CULT: NORMAL

## 2023-01-23 ENCOUNTER — OFFICE VISIT (OUTPATIENT)
Dept: FAMILY MEDICINE CLINIC | Facility: CLINIC | Age: 63
End: 2023-01-23
Payer: MEDICAID

## 2023-01-23 ENCOUNTER — LAB (OUTPATIENT)
Dept: LAB | Facility: HOSPITAL | Age: 63
End: 2023-01-23
Payer: MEDICAID

## 2023-01-23 VITALS
HEIGHT: 66 IN | BODY MASS INDEX: 47.09 KG/M2 | RESPIRATION RATE: 17 BRPM | SYSTOLIC BLOOD PRESSURE: 116 MMHG | WEIGHT: 293 LBS | HEART RATE: 74 BPM | DIASTOLIC BLOOD PRESSURE: 84 MMHG | OXYGEN SATURATION: 95 % | TEMPERATURE: 98 F

## 2023-01-23 DIAGNOSIS — M25.512 CHRONIC LEFT SHOULDER PAIN: Primary | ICD-10-CM

## 2023-01-23 DIAGNOSIS — Z13.220 SCREENING FOR LIPID DISORDERS: ICD-10-CM

## 2023-01-23 DIAGNOSIS — Z23 NEED FOR INFLUENZA VACCINATION: ICD-10-CM

## 2023-01-23 DIAGNOSIS — R73.01 IMPAIRED FASTING GLUCOSE: ICD-10-CM

## 2023-01-23 DIAGNOSIS — M51.36 DDD (DEGENERATIVE DISC DISEASE), LUMBAR: ICD-10-CM

## 2023-01-23 DIAGNOSIS — I10 ESSENTIAL HYPERTENSION: ICD-10-CM

## 2023-01-23 DIAGNOSIS — E66.01 CLASS 3 SEVERE OBESITY DUE TO EXCESS CALORIES WITHOUT SERIOUS COMORBIDITY WITH BODY MASS INDEX (BMI) OF 50.0 TO 59.9 IN ADULT: ICD-10-CM

## 2023-01-23 DIAGNOSIS — G89.29 CHRONIC LEFT SHOULDER PAIN: Primary | ICD-10-CM

## 2023-01-23 DIAGNOSIS — M19.90 ARTHRITIS: ICD-10-CM

## 2023-01-23 DIAGNOSIS — F41.9 ANXIETY: ICD-10-CM

## 2023-01-23 LAB
ALBUMIN SERPL-MCNC: 4 G/DL (ref 3.5–5.2)
ALBUMIN/GLOB SERPL: 1.1 G/DL
ALP SERPL-CCNC: 64 U/L (ref 39–117)
ALT SERPL W P-5'-P-CCNC: 12 U/L (ref 1–33)
ANION GAP SERPL CALCULATED.3IONS-SCNC: 12 MMOL/L (ref 5–15)
AST SERPL-CCNC: 12 U/L (ref 1–32)
BACTERIA UR QL AUTO: ABNORMAL /HPF
BASOPHILS # BLD AUTO: 0.04 10*3/MM3 (ref 0–0.2)
BASOPHILS NFR BLD AUTO: 0.5 % (ref 0–1.5)
BILIRUB SERPL-MCNC: <0.2 MG/DL (ref 0–1.2)
BILIRUB UR QL STRIP: NEGATIVE
BUN SERPL-MCNC: 16 MG/DL (ref 8–23)
BUN/CREAT SERPL: 18.6 (ref 7–25)
CALCIUM SPEC-SCNC: 8.9 MG/DL (ref 8.6–10.5)
CHLORIDE SERPL-SCNC: 104 MMOL/L (ref 98–107)
CHOLEST SERPL-MCNC: 207 MG/DL (ref 0–200)
CLARITY UR: CLEAR
CO2 SERPL-SCNC: 25 MMOL/L (ref 22–29)
COLOR UR: YELLOW
CREAT SERPL-MCNC: 0.86 MG/DL (ref 0.57–1)
DEPRECATED RDW RBC AUTO: 42.3 FL (ref 37–54)
EGFRCR SERPLBLD CKD-EPI 2021: 75.5 ML/MIN/1.73
EOSINOPHIL # BLD AUTO: 0.06 10*3/MM3 (ref 0–0.4)
EOSINOPHIL NFR BLD AUTO: 0.8 % (ref 0.3–6.2)
ERYTHROCYTE [DISTWIDTH] IN BLOOD BY AUTOMATED COUNT: 13 % (ref 12.3–15.4)
GLOBULIN UR ELPH-MCNC: 3.6 GM/DL
GLUCOSE SERPL-MCNC: 101 MG/DL (ref 65–99)
GLUCOSE UR STRIP-MCNC: NEGATIVE MG/DL
HBA1C MFR BLD: 5.9 % (ref 4.8–5.6)
HCT VFR BLD AUTO: 36.2 % (ref 34–46.6)
HDLC SERPL-MCNC: 94 MG/DL (ref 40–60)
HGB BLD-MCNC: 12.2 G/DL (ref 12–15.9)
HGB UR QL STRIP.AUTO: NEGATIVE
HYALINE CASTS UR QL AUTO: ABNORMAL /LPF
IMM GRANULOCYTES # BLD AUTO: 0.01 10*3/MM3 (ref 0–0.05)
IMM GRANULOCYTES NFR BLD AUTO: 0.1 % (ref 0–0.5)
KETONES UR QL STRIP: ABNORMAL
LDLC SERPL CALC-MCNC: 92 MG/DL (ref 0–100)
LDLC/HDLC SERPL: 0.94 {RATIO}
LEUKOCYTE ESTERASE UR QL STRIP.AUTO: ABNORMAL
LYMPHOCYTES # BLD AUTO: 2.45 10*3/MM3 (ref 0.7–3.1)
LYMPHOCYTES NFR BLD AUTO: 31.5 % (ref 19.6–45.3)
MCH RBC QN AUTO: 30.1 PG (ref 26.6–33)
MCHC RBC AUTO-ENTMCNC: 33.7 G/DL (ref 31.5–35.7)
MCV RBC AUTO: 89.4 FL (ref 79–97)
MONOCYTES # BLD AUTO: 0.67 10*3/MM3 (ref 0.1–0.9)
MONOCYTES NFR BLD AUTO: 8.6 % (ref 5–12)
NEUTROPHILS NFR BLD AUTO: 4.54 10*3/MM3 (ref 1.7–7)
NEUTROPHILS NFR BLD AUTO: 58.5 % (ref 42.7–76)
NITRITE UR QL STRIP: NEGATIVE
NRBC BLD AUTO-RTO: 0 /100 WBC (ref 0–0.2)
PH UR STRIP.AUTO: 5.5 [PH] (ref 5–8)
PLATELET # BLD AUTO: 209 10*3/MM3 (ref 140–450)
PMV BLD AUTO: 12 FL (ref 6–12)
POTASSIUM SERPL-SCNC: 4.1 MMOL/L (ref 3.5–5.2)
PROT SERPL-MCNC: 7.6 G/DL (ref 6–8.5)
PROT UR QL STRIP: ABNORMAL
RBC # BLD AUTO: 4.05 10*6/MM3 (ref 3.77–5.28)
RBC # UR STRIP: ABNORMAL /HPF
REF LAB TEST METHOD: ABNORMAL
SODIUM SERPL-SCNC: 141 MMOL/L (ref 136–145)
SP GR UR STRIP: 1.03 (ref 1–1.03)
SQUAMOUS #/AREA URNS HPF: ABNORMAL /HPF
TRIGL SERPL-MCNC: 122 MG/DL (ref 0–150)
TSH SERPL DL<=0.05 MIU/L-ACNC: 1.63 UIU/ML (ref 0.27–4.2)
UROBILINOGEN UR QL STRIP: ABNORMAL
VLDLC SERPL-MCNC: 21 MG/DL (ref 5–40)
WBC # UR STRIP: ABNORMAL /HPF
WBC NRBC COR # BLD: 7.77 10*3/MM3 (ref 3.4–10.8)

## 2023-01-23 PROCEDURE — 85025 COMPLETE CBC W/AUTO DIFF WBC: CPT

## 2023-01-23 PROCEDURE — 80061 LIPID PANEL: CPT

## 2023-01-23 PROCEDURE — 87086 URINE CULTURE/COLONY COUNT: CPT

## 2023-01-23 PROCEDURE — 90686 IIV4 VACC NO PRSV 0.5 ML IM: CPT

## 2023-01-23 PROCEDURE — 84443 ASSAY THYROID STIM HORMONE: CPT

## 2023-01-23 PROCEDURE — 99214 OFFICE O/P EST MOD 30 MIN: CPT

## 2023-01-23 PROCEDURE — 81001 URINALYSIS AUTO W/SCOPE: CPT

## 2023-01-23 PROCEDURE — 80053 COMPREHEN METABOLIC PANEL: CPT

## 2023-01-23 PROCEDURE — 36415 COLL VENOUS BLD VENIPUNCTURE: CPT

## 2023-01-23 PROCEDURE — 83036 HEMOGLOBIN GLYCOSYLATED A1C: CPT

## 2023-01-23 PROCEDURE — 90471 IMMUNIZATION ADMIN: CPT

## 2023-01-23 RX ORDER — HYDROCHLOROTHIAZIDE 25 MG/1
25 TABLET ORAL DAILY
Qty: 90 TABLET | Refills: 1 | Status: SHIPPED | OUTPATIENT
Start: 2023-01-23

## 2023-01-23 RX ORDER — IBUPROFEN 800 MG/1
TABLET ORAL
Qty: 90 TABLET | Refills: 0 | Status: SHIPPED | OUTPATIENT
Start: 2023-01-23 | End: 2023-02-20

## 2023-01-23 RX ORDER — LOSARTAN POTASSIUM 50 MG/1
50 TABLET ORAL DAILY
Qty: 90 TABLET | Refills: 1 | Status: SHIPPED | OUTPATIENT
Start: 2023-01-23

## 2023-01-23 NOTE — ASSESSMENT & PLAN NOTE
Patient cannot tolerate phentermine, so discontinue this.  Patient's insurance will not cover any of the new injectable medications, but will obtain blood work to see if patient is prediabetic and could possibly benefit from some metformin to help with insulin resistance.  Discussed with her to continue with lifestyle modifications, try to adjust her diet to include foods low in carbs/sugars/fats and cholesterol.  Increase exercise as able would be beneficial as well.  Could even consider doing water therapy/aerobics as it would be easier on her joints.

## 2023-01-23 NOTE — ASSESSMENT & PLAN NOTE
Continue with ibuprofen as needed, will refer patient to physical therapy for the left shoulder pain that is chronic, she has had surgical repair of this, if physical therapy does not provide us any benefit, can consider going back to Ortho for further evaluation.

## 2023-01-23 NOTE — PROGRESS NOTES
Anita Estrada presents to [facility] with complaints of left arm pain/achiness, discussed weight, is also here for 2-month follow-up.      History of Present Illness  This is a 62-year-old female who presents to clinic with complaints of left arm pain/achiness, discussed weight, and is also here for 2-month follow-up.    Obesity: Was started on phentermine via last visit, states that she did pretty well, did do 2 rounds of it, but states that the second round is what caused her a lot of issues.  Patient states that she was having some hot flashes, cannot tolerate the hot flashes, and also seem to make her anxiety much worse.  Stopped taking the medication for this reason and does not want to try this medication again in the future.  States that she does really want to try to lose some weight, is actually started going to an exercise place, hoping that that will give her some additional benefit, but is wondering if there is anything else that she can do to try to help with this.  At 1 time was on Ozempic by her previous PCP, although she does not have diabetes, and is wondering if there is anything similar she can take to help that would be covered by her insurance.  Does try to eat correctly, although she does eat a lot of chips and gummy type foods, but states that she is going to try to do better with this as well.    Left arm pain: Has had chronic issues with this in the past, has seen a specialist for this, is even had surgical repair as well.  Patient states that the pain started several weeks ago, states that it is more like an achy pain in her bicep/tricep area, is not a sharp stabbing pain, does have full range of motion, she states that it is an ache.  Does take ibuprofen and her muscle relaxant which gives her some relief, but is unsure of what she needs to do further.  Is scared to go back to her Ortho, does not want to have repeat surgery as it was a long recovery in  "the past.    Hypertension: Stable.  Doing well on the losartan and hydrochlorothiazide.  Blood pressure today is 116/84.  Denies chest pain or shortness of breath.    Sleep apnea: Was recently started on Flonase via her sleep apnea provider, states that she is doing really well on the CPAP machine, and states that it has helped with a lot of the congestion that she was experiencing as well.    Patient does have complaints of some left ear itchiness, some throat itchiness, actually went to the ER for evaluation of this and was treated with a short course of antibiotics which did help some.  Patient states that her tonsils get pretty inflamed and swollen easily, is wondering what else she can do to help with this.  Does not currently take any medications for allergies.    Got flu shot today, does not wish to have pneumonia or Tdap vaccines, does not wish to have COVID booster, otherwise is up-to-date on other preventative screenings.    Patient has been erroneously marked as diabetic. Based on the available clinical information, she does not have diabetes and should therefore be excluded from diabetic health maintenance and quality measures for the remainder of the reporting period.    The following portions of the patient's history were personally reviewed and updated as appropriate: allergies, current medications, past medical history, past surgical history, past family history, and past social history.       Objective   Vital Signs:   /84   Pulse 74   Temp 98 °F (36.7 °C)   Resp 17   Ht 167.6 cm (66\")   Wt (!) 158 kg (347 lb 6.4 oz)   SpO2 95%   BMI 56.07 kg/m²     Body mass index is 56.07 kg/m².    All labs, imaging, test results, and specialty provider notes reviewed with patient.     Physical Exam  Vitals reviewed.   Constitutional:       Appearance: Normal appearance.   Cardiovascular:      Rate and Rhythm: Normal rate and regular rhythm.      Pulses: Normal pulses.      Heart sounds: Normal heart " sounds.   Pulmonary:      Effort: Pulmonary effort is normal.      Breath sounds: Normal breath sounds.   Neurological:      General: No focal deficit present.      Mental Status: She is alert and oriented to person, place, and time.            Assessment and Plan:  Diagnoses and all orders for this visit:    1. Chronic left shoulder pain (Primary)  -     Ambulatory Referral to Physical Therapy Evaluate and treat    2. Essential hypertension  Assessment & Plan:  Stable.  Continue on losartan hydrochlorothiazide, will obtain blood work prior to next visit.  Continue lifestyle modifications, low-salt diet, increasing exercise.  Weight loss would also be beneficial, patient to start working on this.    Orders:  -     hydroCHLOROthiazide (HYDRODIURIL) 25 MG tablet; Take 1 tablet by mouth Daily.  Dispense: 90 tablet; Refill: 1  -     losartan (COZAAR) 50 MG tablet; Take 1 tablet by mouth Daily.  Dispense: 90 tablet; Refill: 1  -     CBC Auto Differential; Future  -     Comprehensive Metabolic Panel; Future  -     TSH Rfx On Abnormal To Free T4; Future  -     Urinalysis With Culture If Indicated -; Future    3. DDD (degenerative disc disease), lumbar    4. Arthritis  Assessment & Plan:  Continue with ibuprofen as needed, will refer patient to physical therapy for the left shoulder pain that is chronic, she has had surgical repair of this, if physical therapy does not provide us any benefit, can consider going back to Ortho for further evaluation.      5. Anxiety  Assessment & Plan:  Pretty well controlled.  Patient to continue using coping mechanisms, does not want to be on medication.      6. Impaired fasting glucose  -     Hemoglobin A1c; Future    7. Screening for lipid disorders  -     Lipid Panel; Future    8. Need for influenza vaccination  -     FluLaval/Fluzone >6 mos ([id number])    9. Class 3 severe obesity due to excess calories without serious comorbidity with body mass index (BMI) of 50.0 to 59.9 in adult  (HCC)  Assessment & Plan:  Patient cannot tolerate phentermine, so discontinue this.  Patient's insurance will not cover any of the new injectable medications, but will obtain blood work to see if patient is prediabetic and could possibly benefit from some metformin to help with insulin resistance.  Discussed with her to continue with lifestyle modifications, try to adjust her diet to include foods low in carbs/sugars/fats and cholesterol.  Increase exercise as able would be beneficial as well.  Could even consider doing water therapy/aerobics as it would be easier on her joints.          Follow Up:  Return in about 6 months (around 7/23/2023).    Patient was given instructions and counseling regarding her condition or for health maintenance advice. Please see specific information pulled into the AVS if appropriate.        Drysol Counseling:  I discussed with the patient the risks of drysol/aluminum chloride including but not limited to skin rash, itching, irritation, burning.

## 2023-01-23 NOTE — ASSESSMENT & PLAN NOTE
Stable.  Continue on losartan hydrochlorothiazide, will obtain blood work prior to next visit.  Continue lifestyle modifications, low-salt diet, increasing exercise.  Weight loss would also be beneficial, patient to start working on this.

## 2023-01-23 NOTE — ASSESSMENT & PLAN NOTE
Pretty well controlled.  Patient to continue using coping mechanisms, does not want to be on medication.

## 2023-01-23 NOTE — LETTER
January 23, 2023     Patient: Anita Estrada   YOB: 1960   Date of Visit: 1/23/2023       To Whom It May Concern:    It is my medical opinion that Anita Estrada will need transportation due to upcoming medical appointments that consist of physical therapy via your services.  Upcoming dates of physical therapy are to be determined, and patient will make you aware of these.               Sincerely,        CHYNA Marina    CC: No Recipients

## 2023-01-24 DIAGNOSIS — R73.03 PREDIABETES: ICD-10-CM

## 2023-01-24 DIAGNOSIS — E66.01 CLASS 3 SEVERE OBESITY DUE TO EXCESS CALORIES WITHOUT SERIOUS COMORBIDITY WITH BODY MASS INDEX (BMI) OF 50.0 TO 59.9 IN ADULT: Primary | ICD-10-CM

## 2023-01-24 LAB — BACTERIA SPEC AEROBE CULT: NO GROWTH

## 2023-01-25 ENCOUNTER — TELEPHONE (OUTPATIENT)
Dept: FAMILY MEDICINE CLINIC | Facility: CLINIC | Age: 63
End: 2023-01-25
Payer: MEDICAID

## 2023-01-25 NOTE — TELEPHONE ENCOUNTER
Caller: Anita Estrada    Relationship: Self    Best call back number: [phone]    What form or medical record are you requesting: LETTER FOR TRANSPORTATION    Who is requesting this form or medical record from you: STATE TRANSPORTATION    How would you like to receive the form or medical records (pick-up, mail, fax): FAX: [phone]    Timeframe paperwork needed: PRIOR TO PHYSICAL THERAPY APPOINTMENT ON 2/3/23-[facility] [place] [address]    Additional notes: PATIENT NEEDS A LETTER THAT REQUESTS TRANSPORTATION TO HER PHYSICAL THERAPY APPOINTMENT. SHE IS UNABLE TO DRIVE AT THIS TIME. PLEASE CALL PATIENT ONCE FAXED TO ABOVE NUMBER.

## 2023-01-26 RX ORDER — NITROFURANTOIN 25; 75 MG/1; MG/1
100 CAPSULE ORAL 2 TIMES DAILY
Qty: 14 CAPSULE | Refills: 0 | Status: SHIPPED | OUTPATIENT
Start: 2023-01-26

## 2023-01-26 RX ORDER — FLUCONAZOLE 150 MG/1
150 TABLET ORAL ONCE
Qty: 1 TABLET | Refills: 0 | Status: SHIPPED | OUTPATIENT
Start: 2023-01-26 | End: 2023-01-26

## 2023-01-26 NOTE — TELEPHONE ENCOUNTER
Informed pt that she would need to call PTA for them to write her a note informing of all dates of appointments. She voiced understanding.

## 2023-01-30 RX ORDER — NITROFURANTOIN 25; 75 MG/1; MG/1
CAPSULE ORAL
Qty: 14 CAPSULE | Refills: 0 | OUTPATIENT
Start: 2023-01-30

## 2023-02-02 DIAGNOSIS — M62.838 MUSCLE SPASM: ICD-10-CM

## 2023-02-02 RX ORDER — METHOCARBAMOL 500 MG/1
TABLET, FILM COATED ORAL
Qty: 90 TABLET | Refills: 0 | Status: SHIPPED | OUTPATIENT
Start: 2023-02-02 | End: 2023-03-08

## 2023-02-06 DIAGNOSIS — G62.9 POLYNEUROPATHY: ICD-10-CM

## 2023-02-06 RX ORDER — GABAPENTIN 800 MG/1
TABLET ORAL
Qty: 120 TABLET | Refills: 2 | Status: SHIPPED | OUTPATIENT
Start: 2023-02-06

## 2023-02-20 ENCOUNTER — OFFICE VISIT (OUTPATIENT)
Dept: PODIATRY | Facility: CLINIC | Age: 63
End: 2023-02-20
Payer: MEDICAID

## 2023-02-20 VITALS
BODY MASS INDEX: 47.09 KG/M2 | TEMPERATURE: 97.5 F | SYSTOLIC BLOOD PRESSURE: 143 MMHG | WEIGHT: 293 LBS | DIASTOLIC BLOOD PRESSURE: 65 MMHG | OXYGEN SATURATION: 89 % | HEIGHT: 66 IN | HEART RATE: 89 BPM

## 2023-02-20 DIAGNOSIS — M79.671 FOOT PAIN, BILATERAL: ICD-10-CM

## 2023-02-20 DIAGNOSIS — B35.1 ONYCHOMYCOSIS: Primary | ICD-10-CM

## 2023-02-20 DIAGNOSIS — M79.672 FOOT PAIN, BILATERAL: ICD-10-CM

## 2023-02-20 DIAGNOSIS — L60.0 ONYCHOCRYPTOSIS: ICD-10-CM

## 2023-02-20 PROCEDURE — 11721 DEBRIDE NAIL 6 OR MORE: CPT | Performed by: PODIATRIST

## 2023-02-20 RX ORDER — IBUPROFEN 800 MG/1
TABLET ORAL
Qty: 90 TABLET | Refills: 0 | Status: SHIPPED | OUTPATIENT
Start: 2023-02-20 | End: 2023-03-20

## 2023-02-20 NOTE — PROGRESS NOTES
[facility] - PODIATRY    Today's Date: 02/20/23    Patient Name: Anita Estrada  MRN: [id number]  CSN: [id number]  PCP: Maris Thurman APRN, Last PCP Visit: 1/23/2023  Referring Provider: No ref. provider found    SUBJECTIVE     Chief Complaint   Patient presents with   • Left Foot - Follow-up, Nail Problem   • Right Foot - Follow-up, Nail Problem     HPI: Anita Estrada, a 62 y.o.female, comes to clinic.    New, Established, New Problem:  established   Location:  Toenails  Duration:   Greater than five years  Onset:  Gradual  Nature:  sore with palpation.  Stable, worsening, improving:   stable  Aggravating factors:  Pain with shoe gear and ambulation.  Previous Treatment:  debridement    No recent medical changes.    Patient denies any fevers, chills, nausea, vomiting, shortness of breath, nor any other constitutional signs nor symptoms.       Past Medical History:   Diagnosis Date   • Acid reflux    • Arthritis    • Asthma    • Carpal tunnel syndrome    • Claustrophobia    • Eczema    • Essential hypertension 03/02/2015   • Foot pain, bilateral    • Hypertension    • Ingrowing nail 08/08/2018   • Limb swelling    • Lumbar back pain    • Migraine headache    • Obesity    • Plantar fascial fibromatosis of right foot 10/11/2018   • Pressure ulcer, stage 1    • Sleep apnea with use of continuous positive airway pressure (CPAP)    • Tinea unguium      Past Surgical History:   Procedure Laterality Date   • BREAST LUMPECTOMY Right 2011   • CARPAL TUNNEL RELEASE Right 03/06/2015, 9/15/17   • DILATATION AND CURETTAGE  1976   • HYSTERECTOMY  1983   • INTRAOCULAR LENS INSERTION       Family History   Problem Relation Age of Onset   • Arthritis Mother         Family history of certain chronic disabling diseases   • Osteoporosis Mother    • Cancer Father         unspecified   • Stroke Other         not specified   • Diabetes Other         unspecified type     Social History     Socioeconomic History   •  Marital status:    Tobacco Use   • Smoking status: Former     Packs/day: 0.50     Years: 20.00     Pack years: 10.00     Types: Cigarettes     Quit date:      Years since quittin.1   • Smokeless tobacco: Never   • Tobacco comments:     smoking since age 11   Vaping Use   • Vaping Use: Every day   • Substances: Nicotine, Flavoring   • Devices: Disposable   Substance and Sexual Activity   • Alcohol use: Yes     Comment: social   • Drug use: Never   • Sexual activity: Yes     Partners: Male     Birth control/protection: Hysterectomy     No Known Allergies  Current Outpatient Medications   Medication Sig Dispense Refill   • Diclofenac Sodium (VOLTAREN) 1 % gel gel Apply  topically to the appropriate area as directed 4 (Four) Times a Day. 100 g 3   • fluticasone (Flonase) 50 MCG/ACT nasal spray 1 spray into the nostril(s) as directed by provider Daily. 18.2 mL 10   • gabapentin (NEURONTIN) 800 MG tablet TAKE 1 TABLET BY MOUTH FOUR TIMES A  tablet 2   • hydroCHLOROthiazide (HYDRODIURIL) 25 MG tablet Take 1 tablet by mouth Daily. 90 tablet 1   • ibuprofen (ADVIL,MOTRIN) 800 MG tablet TAKE 1 TABLET BY MOUTH THREE TIMES A DAY AS NEEDED 90 tablet 0   • losartan (COZAAR) 50 MG tablet Take 1 tablet by mouth Daily. 90 tablet 1   • metFORMIN (Glucophage) 500 MG tablet Take 1 tablet by mouth Daily With Breakfast. 90 tablet 1   • methocarbamol (ROBAXIN) 500 MG tablet TAKE 1 TABLET BY MOUTH THREE TIMES A DAY AS NEEDED FOR MUSCLE SPASMS 90 tablet 0   • nitrofurantoin, macrocrystal-monohydrate, (Macrobid) 100 MG capsule Take 1 capsule by mouth 2 (Two) Times a Day. 14 capsule 0     Current Facility-Administered Medications   Medication Dose Route Frequency Provider Last Rate Last Admin   • cyanocobalamin injection 1,000 mcg  1,000 mcg Intramuscular Q28 Days Valentino Rizo MD   1,000 mcg at 21 1146   • cyanocobalamin injection 1,000 mcg  1,000 mcg Intramuscular Q28 Days Valentino Rizo MD   1,000 mcg at  22 1127     Review of Systems   Constitutional: Negative.    Skin:        Painful toenails.   All other systems reviewed and are negative.      OBJECTIVE     Vitals:    23 1035   BP: 143/65   Pulse: 89   Temp: 97.5 °F (36.4 °C)   SpO2: (!) 89%       Patient seen in no apparent distress.      PHYSICAL EXAM:     Foot/Ankle Exam:       General:   Appearance: obesity    Orientation: AAOx3    Affect: appropriate    Assistance: cane    Shoe Gear:  Sandals    VASCULAR      Right Foot Vascularity   Normal vascular exam    Dorsalis pedis:  2+  Posterior tibial:  2+  Skin Temperature: warm    Edema Grading:  None  CFT:  < 3 seconds  Pedal Hair Growth:  Present  Varicosities: none       Left Foot Vascularity   Normal vascular exam    Dorsalis pedis:  2+  Posterior tibial:  2+  Skin Temperature: warm    Edema Gradin+ and non-pitting  CFT:  < 3 seconds  Pedal Hair Growth:  Present  Varicosities: none        NEUROLOGIC     Right Foot Neurologic   Normal sensation    Light touch sensation:  Normal  Vibratory sensation:  Normal  Hot/Cold sensation: normal    Protective Sensation using Creighton-Bryanna Monofilament:  10     Left Foot Neurologic   Normal sensation    Light touch sensation:  Normal  Vibratory sensation:  Normal  Hot/cold sensation: normal    Protective Sensation using Creighton-Bryanna Monofilament:  10     MUSCLE STRENGTH     Right Foot Muscle Strength   Foot dorsiflexion:  4  Foot plantar flexion:  4  Foot inversion:  4  Foot eversion:  4     Left Foot Muscle Strength   Foot dorsiflexion:  4  Foot plantar flexion:  4  Foot inversion:  4  Foot eversion:  4     RANGE OF MOTION      Right Foot Range of Motion   Foot and ankle ROM within normal limits       Left Foot Range of Motion   Foot and ankle ROM within normal limits       DERMATOLOGIC     Right Foot Dermatologic   Skin: skin intact    Nails: onychomycosis, abnormally thick, subungual debris, dystrophic nails and ingrown toenail    Nails comment:   Toenails 1, 2, 3, 4     Left Foot Dermatologic   Skin: skin intact    Nails: onychomycosis, abnormally thick, subungual debris, dystrophic nails and ingrown toenail    Nails comment:  Toenails 1, 2, 3, 4, and 5    ASSESSMENT/PLAN     Diagnoses and all orders for this visit:    1. Onychomycosis (Primary)    2. Foot pain, bilateral    3. Onychocryptosis        Comprehensive lower extremity examination and evaluation was performed.    Discussed findings and treatment plan including risks, benefits, and treatment options with patient in detail. Patient agreed with treatment plan.    Toenails 1 through 5 on left and toenails 1 through 4 on right were debrided in thickness and length and then smoothed with a Dremel Tool.  Tolerated the procedure well without complications.    An After Visit Summary was printed and given to the patient at discharge, including (if requested) any available informative/educational handouts regarding diagnosis, treatment, or medications. All questions were answered to patient/family satisfaction. Should symptoms fail to improve or worsen they agree to call or return to clinic or to go to the Emergency Department. Discussed the importance of following up with any needed screening tests/labs/specialist appointments and any requested follow-up recommended by me today. Importance of maintaining follow-up discussed and patient accepts that missed appointments can delay diagnosis and potentially lead to worsening of conditions.    Return in about 9 weeks (around 4/24/2023) for Toenail Care., or sooner if acute issues arise.    This document has been electronically signed by Ranulfo Huizar DPM on February 20, 2023 10:39 EST

## 2023-03-08 DIAGNOSIS — M62.838 MUSCLE SPASM: ICD-10-CM

## 2023-03-08 RX ORDER — METHOCARBAMOL 500 MG/1
TABLET, FILM COATED ORAL
Qty: 30 TABLET | Refills: 1 | Status: SHIPPED | OUTPATIENT
Start: 2023-03-08 | End: 2023-03-27

## 2023-03-14 ENCOUNTER — TELEPHONE (OUTPATIENT)
Dept: ORTHOPEDIC SURGERY | Facility: CLINIC | Age: 63
End: 2023-03-14

## 2023-03-14 ENCOUNTER — TRANSCRIBE ORDERS (OUTPATIENT)
Dept: ADMINISTRATIVE | Facility: HOSPITAL | Age: 63
End: 2023-03-14
Payer: MEDICAID

## 2023-03-14 DIAGNOSIS — Z12.31 VISIT FOR SCREENING MAMMOGRAM: Primary | ICD-10-CM

## 2023-03-14 NOTE — TELEPHONE ENCOUNTER
Caller: Anita Estrada    Relationship to patient: Self    Best call back number: [phone]    Patient is needing:  LEFT KNEE INJECTION

## 2023-03-20 RX ORDER — IBUPROFEN 800 MG/1
TABLET ORAL
Qty: 90 TABLET | Refills: 0 | Status: SHIPPED | OUTPATIENT
Start: 2023-03-20

## 2023-03-21 ENCOUNTER — TELEPHONE (OUTPATIENT)
Dept: ORTHOPEDIC SURGERY | Facility: CLINIC | Age: 63
End: 2023-03-21

## 2023-03-21 ENCOUNTER — OFFICE VISIT (OUTPATIENT)
Dept: ORTHOPEDIC SURGERY | Facility: CLINIC | Age: 63
End: 2023-03-21
Payer: MEDICAID

## 2023-03-21 VITALS — WEIGHT: 293 LBS | HEIGHT: 66 IN | BODY MASS INDEX: 47.09 KG/M2

## 2023-03-21 DIAGNOSIS — M79.642 LEFT HAND PAIN: Primary | ICD-10-CM

## 2023-03-21 DIAGNOSIS — M19.042 OSTEOARTHRITIS OF LEFT HAND, UNSPECIFIED OSTEOARTHRITIS TYPE: ICD-10-CM

## 2023-03-21 PROCEDURE — 99203 OFFICE O/P NEW LOW 30 MIN: CPT | Performed by: ORTHOPAEDIC SURGERY

## 2023-03-21 NOTE — PROGRESS NOTES
"Chief Complaint  Initial Evaluation of the Left Hand     Subjective      Anita Estrada presents to [facility] for initial evaluation of the left hand.  She notes her hand has been hurting.  She has had no fall or injury.  She has had pain for 2-3 months.  Back last August she had pain in her wrist.  Her hand is hurting at the base of her fingers. She notes no locking or catching.  She uses diclofenac gel and that helps a little.     No Known Allergies     Social History     Socioeconomic History   • Marital status:    Tobacco Use   • Smoking status: Former     Packs/day: 0.50     Years: 20.00     Pack years: 10.00     Types: Cigarettes     Quit date:      Years since quittin.2   • Smokeless tobacco: Never   • Tobacco comments:     smoking since age 11   Vaping Use   • Vaping Use: Every day   • Substances: Nicotine, Flavoring   • Devices: Disposable   Substance and Sexual Activity   • Alcohol use: Yes     Comment: social   • Drug use: Never   • Sexual activity: Yes     Partners: Male     Birth control/protection: Hysterectomy        Review of Systems     Objective   Vital Signs:   Ht 167.6 cm (66\")   Wt (!) 152 kg (335 lb)   BMI 54.07 kg/m²       Physical Exam  Constitutional:       Appearance: Normal appearance. Patient is well-developed and normal weight.   HENT:      Head: Normocephalic.      Right Ear: Hearing and external ear normal.      Left Ear: Hearing and external ear normal.      Nose: Nose normal.   Eyes:      Conjunctiva/sclera: Conjunctivae normal.   Cardiovascular:      Rate and Rhythm: Normal rate.   Pulmonary:      Effort: Pulmonary effort is normal.      Breath sounds: No wheezing or rales.   Abdominal:      Palpations: Abdomen is soft.      Tenderness: There is no abdominal tenderness.   Musculoskeletal:      Cervical back: Normal range of motion.   Skin:     Findings: No rash.   Neurological:      Mental Status: Patient is alert and oriented to " person, place, and time.   Psychiatric:         Mood and Affect: Mood and affect normal.         Judgment: Judgment normal.       Ortho Exam      LEFT HAND Negative Compression testing/ Negative Tinels. NegativeFinkelsteins. Negative Rizo's testing. Negative CMC grind testing. Negative Phalens. Full ROM of the hand, fingers, elbow and wrist. Negative Triggering of the digit. Sensation grossly intact to light touch, median, radial and ulnar nerve. Positive AIN, PIN and ulnar nerve motor function intact. Axillary nerve intact. Positive pulses.         Procedures      Imaging Results (Most Recent)     Procedure Component Value Units Date/Time    XR Hand 2 View Left [[id number]] Resulted: 03/21/23 1012     Updated: 03/21/23 1013           Result Review :     X-Ray Report:  Left hand  X-Ray  Indication: Evaluation of the left hand.   AP/Lateral and [place] view(s)  Findings: No fracture or dislocation. Mild arthritis.   Prior studies available for comparison: No         Assessment and Plan     Diagnoses and all orders for this visit:    1. Left hand pain (Primary)  -     XR Hand 2 View Left    2. Osteoarthritis of left hand, unspecified osteoarthritis type      Discussed the treatment plan with the patient. I reviewed the X-rays that were obtained today with the patient. Continue therapy and add the left hand if possible    Call or return if worsening symptoms.    Follow Up     PRN    Patient was given instructions and counseling regarding her condition or for health maintenance advice. Please see specific information pulled into the AVS if appropriate.     Scribed for Chad Todd MD by Tania Sumner MA.  03/21/23   10:27 EDT    I have personally performed the services described in this document as scribed by the above individual and it is both accurate and complete. Chad Todd MD 03/21/23

## 2023-03-21 NOTE — TELEPHONE ENCOUNTER
PATIENT WAS SEEN TODAY 3- AND ASKED ME TO LOOK AND SEE WHEN SHE COULD GET ANOTHER GEL INJ IN LEFT KNEE / PATIENT LAST MONOVISC INJ. WAS 10- PATIENT WANTED TO GO ON AND BE SCHEDULED AND ASKED IF WE COULD GET INJ. AUTH /  SO THE  WASN'T BOOKED OUT TO FAR / SCHEDULED PATIENT FOR Friday 4-7-2023  AT 9:45 AM WITH BROOK

## 2023-03-27 DIAGNOSIS — M62.838 MUSCLE SPASM: ICD-10-CM

## 2023-03-27 RX ORDER — METHOCARBAMOL 500 MG/1
TABLET, FILM COATED ORAL
Qty: 30 TABLET | Refills: 0 | Status: SHIPPED | OUTPATIENT
Start: 2023-03-27

## 2023-04-07 ENCOUNTER — OFFICE VISIT (OUTPATIENT)
Dept: ORTHOPEDIC SURGERY | Facility: CLINIC | Age: 63
End: 2023-04-07
Payer: MEDICAID

## 2023-04-07 VITALS — HEIGHT: 66 IN | BODY MASS INDEX: 47.09 KG/M2 | WEIGHT: 293 LBS

## 2023-04-07 DIAGNOSIS — M17.12 OSTEOARTHRITIS OF LEFT KNEE, UNSPECIFIED OSTEOARTHRITIS TYPE: Primary | ICD-10-CM

## 2023-04-07 PROCEDURE — 20610 DRAIN/INJ JOINT/BURSA W/O US: CPT | Performed by: ORTHOPAEDIC SURGERY

## 2023-04-07 NOTE — PROGRESS NOTES
"Chief Complaint  Follow-up of the Left Knee     Subjective      Anita Estrada presents to [facility] for follow-up of left knee pain and osteoarthritis, which she has managed conservatively with intermittent injections in the past. She has had no recent injuries.      No Known Allergies     Social History     Socioeconomic History   • Marital status:    Tobacco Use   • Smoking status: Former     Packs/day: 0.50     Years: 20.00     Pack years: 10.00     Types: Cigarettes     Quit date:      Years since quittin.2   • Smokeless tobacco: Never   • Tobacco comments:     smoking since age 11   Vaping Use   • Vaping Use: Every day   • Substances: Nicotine, Flavoring   • Devices: Disposable   Substance and Sexual Activity   • Alcohol use: Yes     Comment: social   • Drug use: Never   • Sexual activity: Yes     Partners: Male     Birth control/protection: Hysterectomy        Review of Systems     Objective   Vital Signs:   Ht 167.6 cm (66\")   Wt (!) 152 kg (335 lb)   BMI 54.07 kg/m²       Physical Exam  Constitutional:       Appearance: Normal appearance. Patient is well-developed and normal weight.   HENT:      Head: Normocephalic.      Right Ear: Hearing and external ear normal.      Left Ear: Hearing and external ear normal.      Nose: Nose normal.   Eyes:      Conjunctiva/sclera: Conjunctivae normal.   Cardiovascular:      Rate and Rhythm: Normal rate.   Pulmonary:      Effort: Pulmonary effort is normal.      Breath sounds: No wheezing or rales.   Abdominal:      Palpations: Abdomen is soft.      Tenderness: There is no abdominal tenderness.   Musculoskeletal:      Cervical back: Normal range of motion.   Skin:     Findings: No rash.   Neurological:      Mental Status: Patient is alert and oriented to person, place, and time.   Psychiatric:         Mood and Affect: Mood and affect normal.         Judgment: Judgment normal.       Ortho Exam    LEFT KNEE Flexion 120. " Extension 0. Stable to varus/valgus stress. Stable to anterior/posterior drawer. Neurovascularly intact. Negative Sindy. Negative Lachman. Positive EHL, FHL, HS and TA. Sensation intact to light touch all 5 nerves of the foot. Ambulates with Antalgic gait. Patella is well tracking. Calf supple, non-tender. Positive tenderness to the medial joint line. Positive tenderness to the lateral joint line. Positive Crepitus. Good strength to hamstrings, quadriceps, dorsiflexors, and plantar flexors.  Knee Extensor Mechanism intact        Large Joint Arthrocentesis: L knee  Date/Time: 4/7/2023 10:19 AM  Consent given by: patient  Site marked: site marked  Timeout: Immediately prior to procedure a time out was called to verify the correct patient, procedure, equipment, support staff and site/side marked as required   Supporting Documentation  Indications: pain   Procedure Details  Location: knee - L knee  Preparation: Patient was prepped and draped in the usual sterile fashion  Needle gauge: 21g.  Medications administered: 88 mg Hyaluronan 88 MG/4ML  Patient tolerance: patient tolerated the procedure well with no immediate complications            Imaging Results (Most Recent)     None           Result Review :              Assessment and Plan     Diagnoses and all orders for this visit:    1. Osteoarthritis of left knee, unspecified osteoarthritis type (Primary)        Discussed the treatment plan with the patient. Discussed the risks and benefits of conservative measures.  The patient expressed understanding and wished to proceed with a left knee monovisc injection. She tolerated the injection well.     Educated on risk of smoking. Discussed options for smoking cessation. and Call or return if worsening symptoms.    Follow Up     PRN      Patient was given instructions and counseling regarding her condition or for health maintenance advice. Please see specific information pulled into the AVS if appropriate.     Scribed  for Chad Todd MD by Tania Sumner MA.  04/07/23   10:02 EDT    I have personally performed the services described in this document as scribed by the above individual and it is both accurate and complete. Chad Todd MD 04/07/23

## 2023-04-17 RX ORDER — IBUPROFEN 800 MG/1
TABLET ORAL
Qty: 90 TABLET | Refills: 0 | OUTPATIENT
Start: 2023-04-17

## 2023-04-24 DIAGNOSIS — M62.838 MUSCLE SPASM: ICD-10-CM

## 2023-04-24 RX ORDER — METHOCARBAMOL 500 MG/1
TABLET, FILM COATED ORAL
Qty: 90 TABLET | Refills: 2 | Status: SHIPPED | OUTPATIENT
Start: 2023-04-24

## 2023-05-02 DIAGNOSIS — G62.9 POLYNEUROPATHY: ICD-10-CM

## 2023-05-02 RX ORDER — GABAPENTIN 800 MG/1
TABLET ORAL
Qty: 120 TABLET | Refills: 2 | Status: SHIPPED | OUTPATIENT
Start: 2023-05-02

## 2023-05-15 ENCOUNTER — OFFICE VISIT (OUTPATIENT)
Dept: PODIATRY | Facility: CLINIC | Age: 63
End: 2023-05-15
Payer: MEDICAID

## 2023-05-15 VITALS
OXYGEN SATURATION: 94 % | BODY MASS INDEX: 55.68 KG/M2 | HEART RATE: 66 BPM | DIASTOLIC BLOOD PRESSURE: 69 MMHG | SYSTOLIC BLOOD PRESSURE: 126 MMHG | WEIGHT: 293 LBS | TEMPERATURE: 98.9 F

## 2023-05-15 DIAGNOSIS — R26.2 DIFFICULTY WALKING: ICD-10-CM

## 2023-05-15 DIAGNOSIS — M79.671 FOOT PAIN, BILATERAL: ICD-10-CM

## 2023-05-15 DIAGNOSIS — B35.1 ONYCHOMYCOSIS: ICD-10-CM

## 2023-05-15 DIAGNOSIS — M79.672 FOOT PAIN, BILATERAL: ICD-10-CM

## 2023-05-15 DIAGNOSIS — L60.0 ONYCHOCRYPTOSIS: Primary | ICD-10-CM

## 2023-05-15 NOTE — PROGRESS NOTES
[facility] - PODIATRY    Today's Date: 05/15/23    Patient Name: Anita Estrada  MRN: [id number]  CSN: [id number]  PCP: Maris Thurman APRN, Last PCP Visit: 1/23/2023  Referring Provider: No ref. provider found    SUBJECTIVE     Chief Complaint   Patient presents with   • Left Foot - Follow-up, Nail Problem   • Right Foot - Follow-up, Nail Problem     HPI: Anita Estrada, a 62 y.o.female, comes to clinic.    New, Established, New Problem:  established   Location:  Toenails  Duration:   Greater than five years  Onset:  Gradual  Nature:  sore with palpation.  Stable, worsening, improving:   stable  Aggravating factors:  Pain with shoe gear and ambulation.  Previous Treatment:  debridement    No recent medical changes.    Patient denies any fevers, chills, nausea, vomiting, shortness of breath, nor any other constitutional signs nor symptoms.       Past Medical History:   Diagnosis Date   • Acid reflux    • Arthritis    • Asthma    • Carpal tunnel syndrome    • Claustrophobia    • Eczema    • Essential hypertension 03/02/2015   • Foot pain, bilateral    • Hypertension    • Ingrowing nail 08/08/2018   • Limb swelling    • Lumbar back pain    • Migraine headache    • Obesity    • Plantar fascial fibromatosis of right foot 10/11/2018   • Pressure ulcer, stage 1    • Sleep apnea with use of continuous positive airway pressure (CPAP)    • Tinea unguium      Past Surgical History:   Procedure Laterality Date   • BREAST LUMPECTOMY Right 2011   • CARPAL TUNNEL RELEASE Right 03/06/2015, 9/15/17   • DILATATION AND CURETTAGE  1976   • HYSTERECTOMY  1983   • INTRAOCULAR LENS INSERTION       Family History   Problem Relation Age of Onset   • Arthritis Mother         Family history of certain chronic disabling diseases   • Osteoporosis Mother    • Cancer Father         unspecified   • Stroke Other         not specified   • Diabetes Other         unspecified type     Social History     Socioeconomic History   •  Marital status:    Tobacco Use   • Smoking status: Former     Packs/day: 0.50     Years: 20.00     Pack years: 10.00     Types: Cigarettes     Quit date:      Years since quittin.3   • Smokeless tobacco: Never   • Tobacco comments:     smoking since age 11   Vaping Use   • Vaping Use: Every day   • Substances: Nicotine, Flavoring   • Devices: Disposable   Substance and Sexual Activity   • Alcohol use: Yes     Comment: social   • Drug use: Never   • Sexual activity: Yes     Partners: Male     Birth control/protection: Hysterectomy     No Known Allergies  Current Outpatient Medications   Medication Sig Dispense Refill   • Diclofenac Sodium (VOLTAREN) 1 % gel gel Apply  topically to the appropriate area as directed 4 (Four) Times a Day. 100 g 3   • fluticasone (Flonase) 50 MCG/ACT nasal spray 1 spray into the nostril(s) as directed by provider Daily. 18.2 mL 10   • gabapentin (NEURONTIN) 800 MG tablet TAKE 1 TABLET BY MOUTH FOUR TIMES A  tablet 2   • hydroCHLOROthiazide (HYDRODIURIL) 25 MG tablet Take 1 tablet by mouth Daily. 90 tablet 1   • ibuprofen (ADVIL,MOTRIN) 800 MG tablet TAKE 1 TABLET BY MOUTH THREE TIMES A DAY AS NEEDED 90 tablet 0   • losartan (COZAAR) 50 MG tablet Take 1 tablet by mouth Daily. 90 tablet 1   • metFORMIN (Glucophage) 500 MG tablet Take 1 tablet by mouth Daily With Breakfast. 90 tablet 1   • methocarbamol (ROBAXIN) 500 MG tablet TAKE 1 TABLET BY MOUTH THREE TIMES A DAY AS NEEDED FOR MUSCLE SPASMS 90 tablet 2   • nitrofurantoin, macrocrystal-monohydrate, (Macrobid) 100 MG capsule Take 1 capsule by mouth 2 (Two) Times a Day. 14 capsule 0     Current Facility-Administered Medications   Medication Dose Route Frequency Provider Last Rate Last Admin   • cyanocobalamin injection 1,000 mcg  1,000 mcg Intramuscular Q28 Days Valentino Rizo MD   1,000 mcg at 21 1146   • cyanocobalamin injection 1,000 mcg  1,000 mcg Intramuscular Q28 Days Valentino Rizo MD   1,000 mcg at  22 1127     Review of Systems   Constitutional: Negative.    Skin:        Painful toenails.   All other systems reviewed and are negative.      OBJECTIVE     Vitals:    05/15/23 0958   BP: 126/69   Pulse: 66   Temp: 98.9 °F (37.2 °C)   SpO2: 94%       Patient seen in no apparent distress.      PHYSICAL EXAM:     Foot/Ankle Exam    GENERAL  Appearance:  obese  Orientation:  AAOx3  Affect:  appropriate  Gait:  antalgic  Assistance:  cane use  Right shoe gear: casual shoe  Left shoe gear: casual shoe    VASCULAR     Right Foot Vascularity   Normal vascular exam    Dorsalis pedis:  2+  Posterior tibial:  2+  Skin temperature:  warm  Edema grading:  None  CFT:  < 3 seconds  Pedal hair growth:  Present  Varicosities:  none     Left Foot Vascularity   Normal vascular exam    Dorsalis pedis:  2+  Posterior tibial:  2+  Skin temperature:  warm  Edema gradin+ and non-pitting  CFT:  < 3 seconds  Pedal hair growth:  Present  Varicosities:  none     NEUROLOGIC     Right Foot Neurologic   Normal sensation    Light touch sensation: normal  Vibratory sensation: normal  Hot/Cold sensation: normal  Protective Sensation using Pepperell-Bryanna Monofilament:   Sites intact: 10  Sites tested: 10     Left Foot Neurologic   Normal sensation    Light touch sensation: normal  Vibratory sensation: normal  Hot/Cold sensation:  normal  Protective Sensation using Pepperell-Bryanna Monofilament:   Sites intact: 10  Sites tested: 10    MUSCLE STRENGTH     Right Foot Muscle Strength   Foot dorsiflexion:  4  Foot plantar flexion:  4  Foot inversion:  4  Foot eversion:  4     Left Foot Muscle Strength   Foot dorsiflexion:  4  Foot plantar flexion:  4  Foot inversion:  4  Foot eversion:  4    RANGE OF MOTION     Right Foot Range of Motion   Foot and ankle ROM within normal limits       Left Foot Range of Motion   Foot and ankle ROM within normal limits      DERMATOLOGIC      Right Foot Dermatologic   Skin  Right foot skin is intact.    Nails  1.  Positive for elongated, onychomycosis, abnormal thickness, subungual debris and ingrown toenail.  2.  Positive for elongated, onychomycosis, abnormal thickness, subungual debris and ingrown toenail.  3.  Positive for elongated, onychomycosis, abnormal thickness, subungual debris and ingrown toenail.  4.  Positive for elongated, onychomycosis, abnormal thickness, subungual debris and ingrown toenail.     Left Foot Dermatologic   Skin  Left foot skin is intact.   Nails  1.  Positive for elongated, onychomycosis, abnormal thickness, subungual debris and ingrown toenail.  2.  Positive for elongated, onychomycosis, abnormal thickness, subungual debris and ingrown toenail.  3.  Positive for elongated, onychomycosis, abnormal thickness, subungual debris and ingrown toenail.  4.  Positive for elongated, onychomycosis, abnormally thick, subungual debris and ingrown toenail.  5.  Positive for elongated, onychomycosis, abnormally thick, subungual debris and ingrown toenail.    ASSESSMENT/PLAN     Diagnoses and all orders for this visit:    1. Onychocryptosis (Primary)    2. Foot pain, bilateral    3. Onychomycosis    4. Difficulty walking        Comprehensive lower extremity examination and evaluation was performed.    Discussed findings and treatment plan including risks, benefits, and treatment options with patient in detail. Patient agreed with treatment plan.    Toenails 1 through 5 on left and toenails 1 through 4 on right were debrided in thickness and length and then smoothed with a Dremel Tool.  Tolerated the procedure well without complications.    An After Visit Summary was printed and given to the patient at discharge, including (if requested) any available informative/educational handouts regarding diagnosis, treatment, or medications. All questions were answered to patient/family satisfaction. Should symptoms fail to improve or worsen they agree to call or return to clinic or to go to the Emergency  Department. Discussed the importance of following up with any needed screening tests/labs/specialist appointments and any requested follow-up recommended by me today. Importance of maintaining follow-up discussed and patient accepts that missed appointments can delay diagnosis and potentially lead to worsening of conditions.    Return in about 9 weeks (around 7/17/2023) for Toenail Care., or sooner if acute issues arise.    This document has been electronically signed by Ranulfo Huizar DPM on May 15, 2023 10:12 EDT

## 2023-06-05 ENCOUNTER — OFFICE VISIT (OUTPATIENT)
Dept: FAMILY MEDICINE CLINIC | Facility: CLINIC | Age: 63
End: 2023-06-05
Payer: MEDICAID

## 2023-06-05 ENCOUNTER — HOSPITAL ENCOUNTER (OUTPATIENT)
Dept: MAMMOGRAPHY | Facility: HOSPITAL | Age: 63
Discharge: HOME | End: 2023-06-05
Payer: MEDICAID

## 2023-06-05 VITALS
SYSTOLIC BLOOD PRESSURE: 136 MMHG | RESPIRATION RATE: 16 BRPM | WEIGHT: 293 LBS | OXYGEN SATURATION: 95 % | DIASTOLIC BLOOD PRESSURE: 84 MMHG | HEIGHT: 66 IN | BODY MASS INDEX: 47.09 KG/M2 | HEART RATE: 87 BPM

## 2023-06-05 DIAGNOSIS — R73.03 PREDIABETES: Primary | ICD-10-CM

## 2023-06-05 DIAGNOSIS — M19.90 ARTHRITIS: ICD-10-CM

## 2023-06-05 DIAGNOSIS — Z12.31 VISIT FOR SCREENING MAMMOGRAM: ICD-10-CM

## 2023-06-05 DIAGNOSIS — E66.01 CLASS 3 SEVERE OBESITY DUE TO EXCESS CALORIES WITH SERIOUS COMORBIDITY AND BODY MASS INDEX (BMI) OF 50.0 TO 59.9 IN ADULT: ICD-10-CM

## 2023-06-05 DIAGNOSIS — I10 ESSENTIAL HYPERTENSION: ICD-10-CM

## 2023-06-05 LAB
EXPIRATION DATE: NORMAL
HBA1C MFR BLD: 5.9 %
Lab: NORMAL

## 2023-06-05 PROCEDURE — 77063 BREAST TOMOSYNTHESIS BI: CPT

## 2023-06-05 PROCEDURE — 77067 SCR MAMMO BI INCL CAD: CPT

## 2023-06-05 RX ORDER — HYDROCHLOROTHIAZIDE 12.5 MG/1
12.5 TABLET ORAL DAILY
Qty: 90 TABLET | Refills: 0 | Status: SHIPPED | OUTPATIENT
Start: 2023-06-05

## 2023-06-05 RX ORDER — LOSARTAN POTASSIUM 25 MG/1
25 TABLET ORAL DAILY
Qty: 90 TABLET | Refills: 0 | Status: SHIPPED | OUTPATIENT
Start: 2023-06-05

## 2023-06-05 NOTE — PROGRESS NOTES
Chief Complaint  Bump in genital area    Subjective      Anita Estrada is a 62 year old female that presents to [facility] with c/o a bump in the genital area that was there for a couple of weeks. It has since resolved. No fevers or drainage from the area.     She c/o left hand pain that has been going on for a while. She had carpal tunnel surgery 2-3 years ago. No numbness but is having difficulty with ROM. She was previously dx with arthritis.  She states that her hand is stiff and hurts worse in the morning. Symptoms improve as the day goes on.  She states that rain/weather exacerbates it. She does take ibuprofen and gabapentin which seems to help.     She quit taking metformin about 1 month after starting it.  She states she had a lot of urinary pressure and diarrhea. She would like to go back on ozempic. She felt good on this medication and lost a lot of weight.     She also stopped taking her blood pressure medications. She states that they make her feel funny. Makes her feel cold and develops a headache. She is inquiring about lower dosages of those medications.   She checks blood pressure daily at home.  Has been 130/80 some have been a little lower. She has changed her diet and has been trying the mediterranean diet for about 1 month and has lost 5 lbs.          History of Present Illness    Current Outpatient Medications   Medication Instructions    Diclofenac Sodium (VOLTAREN) 1 % gel gel Topical, 4 Times Daily    fluticasone (Flonase) 50 MCG/ACT nasal spray 1 spray, Nasal, Daily    gabapentin (NEURONTIN) 800 MG tablet TAKE 1 TABLET BY MOUTH FOUR TIMES A DAY    hydroCHLOROthiazide (HYDRODIURIL) 12.5 mg, Oral, Daily    ibuprofen (ADVIL,MOTRIN) 800 MG tablet TAKE 1 TABLET BY MOUTH THREE TIMES A DAY AS NEEDED    losartan (COZAAR) 25 mg, Oral, Daily    methocarbamol (ROBAXIN) 500 MG tablet TAKE 1 TABLET BY MOUTH THREE TIMES A DAY AS NEEDED FOR MUSCLE SPASMS     "Semaglutide(0.25 or 0.5MG/DOS) (OZEMPIC) 0.25 mg, Subcutaneous, Weekly       The following portions of the patient's history were reviewed and updated as appropriate: allergies, current medications, past family history, past medical history, past social history, past surgical history, and problem list.    Objective   Vital Signs:   /84   Pulse 87   Resp 16   Ht 167.6 cm (66\")   Wt (!) 155 kg (340 lb 11.2 oz)   SpO2 95%   BMI 54.99 kg/m²     Wt Readings from Last 3 Encounters:   06/05/23 (!) 155 kg (340 lb 11.2 oz)   05/15/23 (!) 156 kg (345 lb)   04/07/23 (!) 152 kg (335 lb)     BP Readings from Last 3 Encounters:   06/05/23 136/84   05/15/23 126/69   02/20/23 143/65     Physical Exam  Vitals reviewed.   Constitutional:       Appearance: Normal appearance. She is well-developed. She is obese. She is not ill-appearing.   HENT:      Head: Normocephalic and atraumatic.      Right Ear: External ear normal.      Left Ear: External ear normal.   Eyes:      Conjunctiva/sclera: Conjunctivae normal.      Pupils: Pupils are equal, round, and reactive to light.   Cardiovascular:      Rate and Rhythm: Normal rate and regular rhythm.      Heart sounds: Normal heart sounds. No murmur heard.     Comments: Trace edema BLE  Pulmonary:      Effort: Pulmonary effort is normal.      Breath sounds: Normal breath sounds.   Skin:     General: Skin is warm and dry.   Neurological:      Mental Status: She is alert and oriented to person, place, and time.   Psychiatric:         Mood and Affect: Affect normal.        Result Review :  The following data was reviewed by: CHYNA Jackson on 06/05/2023:      Common labs          8/11/2022    19:45 1/23/2023    15:32 6/5/2023    09:19   Common Labs   Glucose 104  101     BUN 14  16     Creatinine 0.91  0.86     Sodium 142  141     Potassium 4.3  4.1     Chloride 105  104     Calcium 9.5  8.9     Albumin 3.90  4.0     Total Bilirubin <0.2  <0.2     Alkaline Phosphatase 71  64  "    AST (SGOT) 14  12     ALT (SGPT) 15  12     WBC 5.66  7.77     Hemoglobin 13.3  12.2     Hematocrit 41.0  36.2     Platelets 211  209     Total Cholesterol  207     Triglycerides  122     HDL Cholesterol  94     LDL Cholesterol   92     Hemoglobin A1C  5.90  5.9        Lab Results (last 72 hours)       Procedure Component Value Units Date/Time    POC Glycosylated Hemoglobin (Hb A1C) [[id number]]  (Normal) Collected: 06/05/23 0919    Specimen: Blood Updated: 06/05/23 0920     Hemoglobin A1C 5.9 %      Lot Number [id number]     Expiration Date 11-7-24             No Images in the past 120 days found..        Procedures        Assessment and Plan   Diagnoses and all orders for this visit:    1. Prediabetes (Primary)  Comments:  a1c 5.9. Unsure if insurance will approve ozempic but will start here.  Orders:  -     POC Glycosylated Hemoglobin (Hb A1C)  -     Semaglutide,0.25 or 0.5MG/DOS, (OZEMPIC) 2 MG/1.5ML solution pen-injector; Inject 0.25 mg under the skin into the appropriate area as directed 1 (One) Time Per Week.  Dispense: 2 mL; Refill: 0    2. Essential hypertension  Comments:  stable but slightly higher than recommendations. Will restart medications at 1/2 of previous dose. F/U next month with Maris.  Orders:  -     hydroCHLOROthiazide (HYDRODIURIL) 12.5 MG tablet; Take 1 tablet by mouth Daily.  Dispense: 90 tablet; Refill: 0  -     losartan (Cozaar) 25 MG tablet; Take 1 tablet by mouth Daily.  Dispense: 90 tablet; Refill: 0    3. Class 3 severe obesity due to excess calories with serious comorbidity and body mass index (BMI) of 50.0 to 59.9 in adult  -     Semaglutide,0.25 or 0.5MG/DOS, (OZEMPIC) 2 MG/1.5ML solution pen-injector; Inject 0.25 mg under the skin into the appropriate area as directed 1 (One) Time Per Week.  Dispense: 2 mL; Refill: 0    4. Arthritis  Comments:  continue current medications. can also try voltaren gel. Provided with exercises to do at home.          Medications Discontinued During  This Encounter   Medication Reason    nitrofurantoin, macrocrystal-monohydrate, (Macrobid) 100 MG capsule *Therapy completed    metFORMIN (Glucophage) 500 MG tablet Other- See Medication Note    hydroCHLOROthiazide (HYDRODIURIL) 25 MG tablet Dose adjustment    losartan (COZAAR) 50 MG tablet Dose adjustment          Follow Up   No follow-ups on file.  Patient was given instructions and counseling regarding her condition or for health maintenance advice. Please see specific information pulled into the AVS if appropriate.       CHYNA Jackson  06/05/23  10:46 EDT

## 2023-07-28 DIAGNOSIS — G62.9 POLYNEUROPATHY: ICD-10-CM

## 2023-07-31 RX ORDER — GABAPENTIN 800 MG/1
TABLET ORAL
Qty: 120 TABLET | Refills: 2 | Status: SHIPPED | OUTPATIENT
Start: 2023-07-31

## 2023-08-01 RX ORDER — IBUPROFEN 800 MG/1
TABLET ORAL
Qty: 90 TABLET | Refills: 0 | Status: SHIPPED | OUTPATIENT
Start: 2023-08-01

## 2023-08-14 DIAGNOSIS — M62.838 MUSCLE SPASM: ICD-10-CM

## 2023-08-14 RX ORDER — METHOCARBAMOL 500 MG/1
TABLET, FILM COATED ORAL
Qty: 90 TABLET | Refills: 0 | Status: SHIPPED | OUTPATIENT
Start: 2023-08-14

## 2023-08-16 ENCOUNTER — TELEPHONE (OUTPATIENT)
Dept: ORTHOPEDIC SURGERY | Facility: CLINIC | Age: 63
End: 2023-08-16

## 2023-08-16 NOTE — TELEPHONE ENCOUNTER
Caller: Anita Estrada    Relationship to patient: Self    Best call back number: [phone]    Chief complaint: LEFT KNEE PAIN    Type of visit: GEL INJECTION    Requested date: ASAP       Additional notes: OKAY TO LVM

## 2023-08-28 RX ORDER — IBUPROFEN 800 MG/1
TABLET ORAL
Qty: 90 TABLET | Refills: 0 | Status: SHIPPED | OUTPATIENT
Start: 2023-08-28

## 2023-08-29 ENCOUNTER — TELEPHONE (OUTPATIENT)
Dept: ORTHOPEDIC SURGERY | Facility: CLINIC | Age: 63
End: 2023-08-29
Payer: MEDICAID

## 2023-08-29 NOTE — TELEPHONE ENCOUNTER
----- Message from Leslie Slater sent at 8/24/2023  9:14 AM EDT -----  Patient has been approved for left Knee Monovisc for dates:  03/23/23 to 03/27/24.  Okay to schedule when appropriate.

## 2023-08-29 NOTE — TELEPHONE ENCOUNTER
SENT THRU MY CHART (8-29-23)  YOUR APPT WILL BE ON:   DATE:   10-10-23  TIME:   1:00 PM  REASON:  LT KNEE MONOVISC INJECTION  PROVIDER:  PAULA LEONARD    PER YOUR INSURANCE, APPROVED.    IF YOU HAVE ANY QUESTIONS REGARDING YOUR APPOINTMENT, PLEASE CALL US [phone].  THANKS    MOSES

## 2023-09-06 ENCOUNTER — OFFICE VISIT (OUTPATIENT)
Dept: FAMILY MEDICINE CLINIC | Facility: CLINIC | Age: 63
End: 2023-09-06
Payer: MEDICARE

## 2023-09-06 VITALS
TEMPERATURE: 97.9 F | HEIGHT: 66 IN | SYSTOLIC BLOOD PRESSURE: 138 MMHG | OXYGEN SATURATION: 95 % | WEIGHT: 293 LBS | HEART RATE: 75 BPM | DIASTOLIC BLOOD PRESSURE: 72 MMHG | BODY MASS INDEX: 47.09 KG/M2

## 2023-09-06 DIAGNOSIS — E66.01 CLASS 3 SEVERE OBESITY DUE TO EXCESS CALORIES WITHOUT SERIOUS COMORBIDITY WITH BODY MASS INDEX (BMI) OF 50.0 TO 59.9 IN ADULT: Primary | ICD-10-CM

## 2023-09-06 DIAGNOSIS — E55.9 VITAMIN D DEFICIENCY: ICD-10-CM

## 2023-09-06 DIAGNOSIS — Z13.220 SCREENING FOR LIPID DISORDERS: ICD-10-CM

## 2023-09-06 DIAGNOSIS — M62.838 MUSCLE SPASM: ICD-10-CM

## 2023-09-06 DIAGNOSIS — R53.83 FATIGUE, UNSPECIFIED TYPE: ICD-10-CM

## 2023-09-06 DIAGNOSIS — M19.042 LOCALIZED PRIMARY OSTEOARTHRITIS OF LEFT HAND: ICD-10-CM

## 2023-09-06 DIAGNOSIS — I10 ESSENTIAL HYPERTENSION: ICD-10-CM

## 2023-09-06 DIAGNOSIS — N90.7 INCLUSION CYST OF VULVA: ICD-10-CM

## 2023-09-06 DIAGNOSIS — M19.90 ARTHRITIS: ICD-10-CM

## 2023-09-06 DIAGNOSIS — G62.9 POLYNEUROPATHY: ICD-10-CM

## 2023-09-06 DIAGNOSIS — R73.03 PREDIABETES: ICD-10-CM

## 2023-09-06 DIAGNOSIS — M51.36 DDD (DEGENERATIVE DISC DISEASE), LUMBAR: ICD-10-CM

## 2023-09-06 RX ORDER — LOSARTAN POTASSIUM 25 MG/1
25 TABLET ORAL DAILY
Qty: 90 TABLET | Refills: 3 | Status: SHIPPED | OUTPATIENT
Start: 2023-09-06

## 2023-09-06 RX ORDER — HYDROCHLOROTHIAZIDE 12.5 MG/1
12.5 TABLET ORAL DAILY
Qty: 90 TABLET | Refills: 3 | Status: SHIPPED | OUTPATIENT
Start: 2023-09-06

## 2023-09-06 RX ORDER — METHOCARBAMOL 500 MG/1
TABLET, FILM COATED ORAL
Qty: 90 TABLET | Refills: 0 | Status: SHIPPED | OUTPATIENT
Start: 2023-09-06

## 2023-09-06 NOTE — PROGRESS NOTES
Anita Estrada presents to [facility] who presents to the clinic for 6-month follow-up with complaints of knot/lesion on vagina, left hand pain.      History of Present Illness  This is a 62-year-old female who presents to the clinic for 6-month follow-up with complaints of knot/lesion on vagina and left hand pain.    Prediabetes/obesity: Patient states that she really needs to lose weight, knows that it were really help with her chronic left knee osteoarthritis, and she really needs enough knee replacement but they would not do so until she is able to lose some weight.  States that she was on Ozempic at 1 point for her prediabetes, but then was taken off of this because her insurance quit covering it.  States that she has tried phentermine in the past, could not tolerate it.  At this point, is considering having bariatric surgery.  Would like referral to a bariatric surgeon.    Patient also is been having some issues with her left hand.  Patient states that she actually went to [facility] about this, had a left hand x-ray which did show osteoarthritis.  Patient states that the pain is pretty intense, that she is tried ibuprofen, gabapentin, nothing is helping her.  At this point, does not know what else to do.  Would like this further evaluated or even possibly find a solution for it.    Patient also has complaints of lesion/lumps on her vaginal area.  Patient states that he been there since January or so, and at this point just wants to have this evaluated and see what the cause of that is.  Denies any vaginal symptoms, no vaginal discharge, no vaginal pain.  Has had a hysterectomy.    Hypertension: Stable.  Blood pressure today is 138/72.  Remains on losartan and hydrochlorothiazide.        The following portions of the patient's history were personally reviewed and updated as appropriate: allergies, current medications, past medical history, past surgical history, past family  "history, and past social history.       Objective   Vital Signs:   /72 (BP Location: Left arm, Patient Position: Sitting, Cuff Size: Adult)   Pulse 75   Temp 97.9 °F (36.6 °C) (Temporal)   Ht 167.6 cm (66\")   Wt (!) 149 kg (329 lb 1.6 oz)   SpO2 95%   BMI 53.12 kg/m²     Body mass index is 53.12 kg/m².    All labs, imaging, test results, and specialty provider notes reviewed with patient.     Physical Exam  Vitals reviewed.   Constitutional:       Appearance: Normal appearance.   Cardiovascular:      Rate and Rhythm: Normal rate and regular rhythm.      Pulses: Normal pulses.      Heart sounds: Normal heart sounds.   Pulmonary:      Effort: Pulmonary effort is normal.      Breath sounds: Normal breath sounds.   Neurological:      General: No focal deficit present.      Mental Status: She is alert and oriented to person, place, and time.          Incision & Drainage    Date/Time: 9/6/2023 3:55 PM  Performed by: Maris Thurman APRN  Authorized by: Maris Thurman APRN   Type: cyst  Body area: anogenital  Location details: vulva    Sedation:  Patient sedated: no    Needle gauge: 18  Drainage: serous  Drainage amount: scant  Wound treatment: wound left open  Patient tolerance: patient tolerated the procedure well with no immediate complications         Class 3 Severe Obesity (BMI >=40). Obesity-related health conditions include the following: hypertension, osteoarthritis, and lower extremity venous stasis disease. Obesity is unchanged. BMI is is above average; BMI management plan is completed. We discussed portion control, increasing exercise, consulting a Bariatric surgeon, and management of depression/anxiety/stress to control compensatory eating.            Assessment and Plan:  Diagnoses and all orders for this visit:    1. Class 3 severe obesity due to excess calories without serious comorbidity with body mass index (BMI) of 50.0 to 59.9 in adult (Primary)  Comments:  Refer to bariatric " surgeon.  Continue lifestyle modifications.  Would like GLP, insurance would not cover.  Orders:  -     Ambulatory Referral to Bariatric Surgery    2. Polyneuropathy  Comments:  Continue on gabapentin current dose UDS/consent reviewed, Elvin reviewed.    3. Prediabetes  Comments:  Recheck blood work.  Lifestyle modifications.  Orders:  -     Hemoglobin A1c; Future    4. Arthritis    5. Essential hypertension  -     CBC Auto Differential; Future  -     Comprehensive Metabolic Panel; Future  -     TSH Rfx On Abnormal To Free T4; Future  -     Urinalysis With Culture If Indicated -; Future  -     losartan (Cozaar) 25 MG tablet; Take 1 tablet by mouth Daily.  Dispense: 90 tablet; Refill: 3  -     hydroCHLOROthiazide (HYDRODIURIL) 12.5 MG tablet; Take 1 tablet by mouth Daily.  Dispense: 90 tablet; Refill: 3    6. Screening for lipid disorders  -     Lipid Panel; Future    7. DDD (degenerative disc disease), lumbar    8. Inclusion cyst of vulva  Comments:  Did during these at visit today.  No acute complications noted.    9. Vitamin D deficiency  -     Vitamin D,25-Hydroxy; Future    10. Fatigue, unspecified type  -     Vitamin B12 & Folate; Future    11. Localized primary osteoarthritis of left hand  Comments:  Refer to Ortho hand specialist for evaluation.  Likely would benefit from joint injection.  Orders:  -     Ambulatory Referral to Orthopedic Surgery    Other orders  -     Incision & Drainage          Follow Up:  No follow-ups on file.    Patient was given instructions and counseling regarding her condition or for health maintenance advice. Please see specific information pulled into the AVS if appropriate.

## 2023-09-11 ENCOUNTER — TELEPHONE (OUTPATIENT)
Dept: FAMILY MEDICINE CLINIC | Facility: CLINIC | Age: 63
End: 2023-09-11

## 2023-09-11 NOTE — TELEPHONE ENCOUNTER
Caller: Anita Estrada    Relationship: Self    Best call back number: [phone]     What form or medical record are you requesting: FORM FOR BARIATRIC SURGERY NEEDS TO BE FILLED OUT    Who is requesting this form or medical record from you: [facility]    How would you like to receive the form or medical records (pick-up, mail, fax): FAX TO [facility], NUMBER IS ON THE PAPER    Timeframe paperwork needed: ASAP    Additional notes: PATIENT STATED THAT SHE WILL DROP THIS PAPERWORK OFF TOMORROW. PLEASE CALL IF THERE ARE ANY QUESTIONS.

## 2023-09-13 ENCOUNTER — LAB (OUTPATIENT)
Dept: LAB | Facility: HOSPITAL | Age: 63
End: 2023-09-13
Payer: MEDICAID

## 2023-09-13 DIAGNOSIS — Z13.220 SCREENING FOR LIPID DISORDERS: ICD-10-CM

## 2023-09-13 DIAGNOSIS — E55.9 VITAMIN D DEFICIENCY: ICD-10-CM

## 2023-09-13 DIAGNOSIS — R73.03 PREDIABETES: ICD-10-CM

## 2023-09-13 DIAGNOSIS — I10 ESSENTIAL HYPERTENSION: ICD-10-CM

## 2023-09-13 DIAGNOSIS — R53.83 FATIGUE, UNSPECIFIED TYPE: ICD-10-CM

## 2023-09-13 LAB
ALBUMIN SERPL-MCNC: 3.8 G/DL (ref 3.5–5.2)
ALBUMIN/GLOB SERPL: 0.9 G/DL
ALP SERPL-CCNC: 63 U/L (ref 39–117)
ALT SERPL W P-5'-P-CCNC: 13 U/L (ref 1–33)
ANION GAP SERPL CALCULATED.3IONS-SCNC: 8.6 MMOL/L (ref 5–15)
AST SERPL-CCNC: 13 U/L (ref 1–32)
BACTERIA UR QL AUTO: ABNORMAL /HPF
BASOPHILS # BLD AUTO: 0.06 10*3/MM3 (ref 0–0.2)
BASOPHILS NFR BLD AUTO: 0.8 % (ref 0–1.5)
BILIRUB SERPL-MCNC: 0.3 MG/DL (ref 0–1.2)
BILIRUB UR QL STRIP: NEGATIVE
BUN SERPL-MCNC: 17 MG/DL (ref 8–23)
BUN/CREAT SERPL: 15.9 (ref 7–25)
CALCIUM SPEC-SCNC: 9.2 MG/DL (ref 8.6–10.5)
CHLORIDE SERPL-SCNC: 106 MMOL/L (ref 98–107)
CHOLEST SERPL-MCNC: 161 MG/DL (ref 0–200)
CLARITY UR: ABNORMAL
CO2 SERPL-SCNC: 28.4 MMOL/L (ref 22–29)
COLOR UR: YELLOW
CREAT SERPL-MCNC: 1.07 MG/DL (ref 0.57–1)
DEPRECATED RDW RBC AUTO: 40.2 FL (ref 37–54)
EGFRCR SERPLBLD CKD-EPI 2021: 58.1 ML/MIN/1.73
EOSINOPHIL # BLD AUTO: 0.05 10*3/MM3 (ref 0–0.4)
EOSINOPHIL NFR BLD AUTO: 0.7 % (ref 0.3–6.2)
ERYTHROCYTE [DISTWIDTH] IN BLOOD BY AUTOMATED COUNT: 12.5 % (ref 12.3–15.4)
GLOBULIN UR ELPH-MCNC: 4.1 GM/DL
GLUCOSE SERPL-MCNC: 101 MG/DL (ref 65–99)
GLUCOSE UR STRIP-MCNC: NEGATIVE MG/DL
HBA1C MFR BLD: 6.4 % (ref 4.8–5.6)
HCT VFR BLD AUTO: 36.9 % (ref 34–46.6)
HDLC SERPL-MCNC: 68 MG/DL (ref 40–60)
HGB BLD-MCNC: 12.3 G/DL (ref 12–15.9)
HGB UR QL STRIP.AUTO: ABNORMAL
HYALINE CASTS UR QL AUTO: ABNORMAL /LPF
IMM GRANULOCYTES # BLD AUTO: 0.04 10*3/MM3 (ref 0–0.05)
IMM GRANULOCYTES NFR BLD AUTO: 0.5 % (ref 0–0.5)
KETONES UR QL STRIP: NEGATIVE
LDLC SERPL CALC-MCNC: 78 MG/DL (ref 0–100)
LDLC/HDLC SERPL: 1.13 {RATIO}
LEUKOCYTE ESTERASE UR QL STRIP.AUTO: ABNORMAL
LYMPHOCYTES # BLD AUTO: 2.23 10*3/MM3 (ref 0.7–3.1)
LYMPHOCYTES NFR BLD AUTO: 29.5 % (ref 19.6–45.3)
MCH RBC QN AUTO: 29.6 PG (ref 26.6–33)
MCHC RBC AUTO-ENTMCNC: 33.3 G/DL (ref 31.5–35.7)
MCV RBC AUTO: 88.9 FL (ref 79–97)
MONOCYTES # BLD AUTO: 0.43 10*3/MM3 (ref 0.1–0.9)
MONOCYTES NFR BLD AUTO: 5.7 % (ref 5–12)
NEUTROPHILS NFR BLD AUTO: 4.74 10*3/MM3 (ref 1.7–7)
NEUTROPHILS NFR BLD AUTO: 62.8 % (ref 42.7–76)
NITRITE UR QL STRIP: POSITIVE
NRBC BLD AUTO-RTO: 0 /100 WBC (ref 0–0.2)
PH UR STRIP.AUTO: 5.5 [PH] (ref 5–8)
PLATELET # BLD AUTO: 381 10*3/MM3 (ref 140–450)
PMV BLD AUTO: 10.6 FL (ref 6–12)
POTASSIUM SERPL-SCNC: 4.3 MMOL/L (ref 3.5–5.2)
PROT SERPL-MCNC: 7.9 G/DL (ref 6–8.5)
PROT UR QL STRIP: ABNORMAL
RBC # BLD AUTO: 4.15 10*6/MM3 (ref 3.77–5.28)
RBC # UR STRIP: ABNORMAL /HPF
REF LAB TEST METHOD: ABNORMAL
SODIUM SERPL-SCNC: 143 MMOL/L (ref 136–145)
SP GR UR STRIP: 1.02 (ref 1–1.03)
SQUAMOUS #/AREA URNS HPF: ABNORMAL /HPF
TRIGL SERPL-MCNC: 81 MG/DL (ref 0–150)
TSH SERPL DL<=0.05 MIU/L-ACNC: 1.04 UIU/ML (ref 0.27–4.2)
UROBILINOGEN UR QL STRIP: ABNORMAL
VLDLC SERPL-MCNC: 15 MG/DL (ref 5–40)
WBC # UR STRIP: ABNORMAL /HPF
WBC NRBC COR # BLD: 7.55 10*3/MM3 (ref 3.4–10.8)

## 2023-09-13 PROCEDURE — 87186 SC STD MICRODIL/AGAR DIL: CPT

## 2023-09-13 PROCEDURE — 82746 ASSAY OF FOLIC ACID SERUM: CPT

## 2023-09-13 PROCEDURE — 82607 VITAMIN B-12: CPT

## 2023-09-13 PROCEDURE — 80061 LIPID PANEL: CPT

## 2023-09-13 PROCEDURE — 82306 VITAMIN D 25 HYDROXY: CPT

## 2023-09-13 PROCEDURE — 87086 URINE CULTURE/COLONY COUNT: CPT

## 2023-09-13 PROCEDURE — 36415 COLL VENOUS BLD VENIPUNCTURE: CPT

## 2023-09-13 PROCEDURE — 85025 COMPLETE CBC W/AUTO DIFF WBC: CPT

## 2023-09-13 PROCEDURE — 84443 ASSAY THYROID STIM HORMONE: CPT

## 2023-09-13 PROCEDURE — 83036 HEMOGLOBIN GLYCOSYLATED A1C: CPT

## 2023-09-13 PROCEDURE — 87077 CULTURE AEROBIC IDENTIFY: CPT

## 2023-09-13 PROCEDURE — 81001 URINALYSIS AUTO W/SCOPE: CPT

## 2023-09-13 PROCEDURE — 80053 COMPREHEN METABOLIC PANEL: CPT

## 2023-09-14 LAB
25(OH)D3 SERPL-MCNC: 24 NG/ML (ref 30–100)
FOLATE SERPL-MCNC: 6.72 NG/ML (ref 4.78–24.2)
VIT B12 BLD-MCNC: 577 PG/ML (ref 211–946)

## 2023-09-15 LAB — BACTERIA SPEC AEROBE CULT: ABNORMAL

## 2023-09-18 ENCOUNTER — TELEPHONE (OUTPATIENT)
Dept: FAMILY MEDICINE CLINIC | Facility: CLINIC | Age: 63
End: 2023-09-18
Payer: MEDICAID

## 2023-09-18 RX ORDER — CEPHALEXIN 500 MG/1
500 CAPSULE ORAL 2 TIMES DAILY
Qty: 14 CAPSULE | Refills: 0 | Status: SHIPPED | OUTPATIENT
Start: 2023-09-18

## 2023-09-18 RX ORDER — ERGOCALCIFEROL 1.25 MG/1
50000 CAPSULE ORAL WEEKLY
Qty: 15 CAPSULE | Refills: 1 | Status: SHIPPED | OUTPATIENT
Start: 2023-09-18

## 2023-09-18 NOTE — TELEPHONE ENCOUNTER
Patient called about lab results. When over them with her letting her know:  Urinalysis looks like he may have a urinary tract infection. Medication sent in. Kidney function is stable, electrolytes all within good range. Thyroid function within good range.Cholesterol panel looks great.Hemoglobin A1c is borderline to be diabetic.  You are at a 6.4%, diabetes is 6.5%.Vitamin B12 and folate within good range.  Thyroid function within good range.  Complete blood count within good range. Patient verbalized understanding.   Vitamin D level is still low, are you taking vitamin D supplements?  Taye is not taking a supplement and is wanting to know if Maris would suggest one or if she able to send in a medication for her to her pharmacy.

## 2023-09-22 RX ORDER — CEPHALEXIN 500 MG/1
CAPSULE ORAL
Qty: 14 CAPSULE | Refills: 0 | OUTPATIENT
Start: 2023-09-22

## 2023-09-25 RX ORDER — IBUPROFEN 800 MG/1
TABLET ORAL
Qty: 90 TABLET | Refills: 0 | Status: SHIPPED | OUTPATIENT
Start: 2023-09-25

## 2023-10-09 DIAGNOSIS — M62.838 MUSCLE SPASM: ICD-10-CM

## 2023-10-09 RX ORDER — METHOCARBAMOL 500 MG/1
500 TABLET, FILM COATED ORAL 3 TIMES DAILY PRN
Qty: 90 TABLET | Refills: 0 | OUTPATIENT
Start: 2023-10-09

## 2023-10-09 RX ORDER — METHOCARBAMOL 500 MG/1
500 TABLET, FILM COATED ORAL 3 TIMES DAILY PRN
Qty: 90 TABLET | Refills: 3 | Status: SHIPPED | OUTPATIENT
Start: 2023-10-09

## 2023-10-10 ENCOUNTER — OFFICE VISIT (OUTPATIENT)
Dept: ORTHOPEDIC SURGERY | Facility: CLINIC | Age: 63
End: 2023-10-10
Payer: MEDICAID

## 2023-10-10 VITALS
WEIGHT: 293 LBS | SYSTOLIC BLOOD PRESSURE: 151 MMHG | BODY MASS INDEX: 47.09 KG/M2 | DIASTOLIC BLOOD PRESSURE: 85 MMHG | OXYGEN SATURATION: 94 % | HEART RATE: 77 BPM | HEIGHT: 66 IN

## 2023-10-10 DIAGNOSIS — M17.12 PRIMARY OSTEOARTHRITIS OF LEFT KNEE: Primary | ICD-10-CM

## 2023-10-10 NOTE — PROGRESS NOTES
"Chief Complaint  Follow-up and Pain of the Left Knee    Subjective      Anita Estrada presents to [facility] for follow-up of left knee osteoarthritis which she is managed conservatively with intermittent injections.  She denies recent injury.  She was last seen on 4/7/23 and received a gel injection at that time.  She reports that the gel injection works very well for her.  She is here today to receive a subsequent dose.    Objective   No Known Allergies    Vital Signs:   /85   Pulse 77   Ht 167.6 cm (66\")   Wt (!) 155 kg (342 lb)   SpO2 94%   BMI 55.20 kg/mý     Please follow-up with PCP regarding blood pressure.  Physical Exam    Constitutional: Awake, alert. Well nourished appearance.    Integumentary: Warm, dry, intact. No obvious rashes.    HENT: Atraumatic, normocephalic.   Respiratory: Non labored respirations .   Cardiovascular: Intact peripheral pulses.    Psychiatric: Normal mood and affect. A&O X3    Ortho Exam  Left knee: Range of motion 0 to 120 degrees.  Stable to varus and valgus stress.  Negative anterior and posterior drawer test.  Sensation is intact.  Tender to palpation the medial lateral joint line.  Neurovascular intact.  Negative Lachman's.  Negative Sindy's.    Imaging Results (Most Recent)       None             Large Joint LEFT KNEE: L knee  Date/Time: 10/10/2023 1:25 PM  Consent given by: patient  Site marked: site marked  Timeout: Immediately prior to procedure a time out was called to verify the correct patient, procedure, equipment, support staff and site/side marked as required   Supporting Documentation  Indications: pain   Procedure Details  Location: knee - L knee  Preparation: Patient was prepped and draped in the usual sterile fashion  Needle gauge: 21G.  Medications administered: 88 mg Hyaluronan 88 MG/4ML  Patient tolerance: patient tolerated the procedure well with no immediate complications              Assessment and Plan "   Problem List Items Addressed This Visit          Musculoskeletal and Injuries    Primary osteoarthritis of left knee - Primary    Relevant Orders    Large Joint LEFT KNEE: L knee       Follow Up   Return if symptoms worsen or fail to improve.    Patient is a smoker, please discuss smoking cessation options with your PCP.    Social History     Socioeconomic History    Marital status:    Tobacco Use    Smoking status: Former     Packs/day: 0.50     Years: 20.00     Additional pack years: 0.00     Total pack years: 10.00     Types: Cigarettes     Quit date:      Years since quittin.7    Smokeless tobacco: Never    Tobacco comments:     smoking since age 11   Vaping Use    Vaping Use: Every day    Substances: Nicotine, Flavoring    Devices: Disposable   Substance and Sexual Activity    Alcohol use: Yes     Comment: social    Drug use: Never    Sexual activity: Yes     Partners: Male     Birth control/protection: Hysterectomy       Patient Instructions   Patient received Visco injection today in office into the left knee and tolerated the procedure well without complication.  Counseled that these injections can be given every 6 months and 1 day with insurance approval. Pt can also receive steroid injections intermittently between these doses.  Steroid injections can be given every 3 months.    Follow up as needed. Call with questions, concerns, or worsening symptoms.   Patient was given instructions and counseling regarding her condition or for health maintenance advice. Please see specific information pulled into the AVS if appropriate.

## 2023-10-12 NOTE — PATIENT INSTRUCTIONS
Patient received Visco injection today in office into the left knee and tolerated the procedure well without complication.  Counseled that these injections can be given every 6 months and 1 day with insurance approval. Pt can also receive steroid injections intermittently between these doses.  Steroid injections can be given every 3 months.    Follow up as needed. Call with questions, concerns, or worsening symptoms.

## 2023-10-23 RX ORDER — IBUPROFEN 800 MG/1
TABLET ORAL
Qty: 90 TABLET | Refills: 0 | Status: SHIPPED | OUTPATIENT
Start: 2023-10-23

## 2023-10-23 RX ORDER — IBUPROFEN 800 MG/1
800 TABLET ORAL 3 TIMES DAILY PRN
Qty: 90 TABLET | Refills: 0 | Status: CANCELLED | OUTPATIENT
Start: 2023-10-23

## 2023-10-26 DIAGNOSIS — G62.9 POLYNEUROPATHY: ICD-10-CM

## 2023-10-26 NOTE — TELEPHONE ENCOUNTER
Called patient and made her aware we will need an updated UDS&Consent to send in her Gabapentin.    She verbally stated her understanding and stated she would come in as a nurse visit to complete this.

## 2023-10-30 RX ORDER — FLUTICASONE PROPIONATE 50 MCG
1 SPRAY, SUSPENSION (ML) NASAL DAILY
Qty: 18.2 ML | Refills: 10 | Status: SHIPPED | OUTPATIENT
Start: 2023-10-30

## 2023-10-31 ENCOUNTER — TELEPHONE (OUTPATIENT)
Dept: BARIATRICS/WEIGHT MGMT | Facility: CLINIC | Age: 63
End: 2023-10-31
Payer: MEDICAID

## 2023-10-31 NOTE — TELEPHONE ENCOUNTER
Patient insurance changing on 11/01/23, patient have intake on 11/07/23. I will check patient new insurance on 11/01/23 and update.

## 2023-11-07 ENCOUNTER — CONSULT (OUTPATIENT)
Dept: BARIATRICS/WEIGHT MGMT | Facility: CLINIC | Age: 63
End: 2023-11-07
Payer: MEDICARE

## 2023-11-07 ENCOUNTER — HOSPITAL ENCOUNTER (OUTPATIENT)
Dept: GENERAL RADIOLOGY | Facility: HOSPITAL | Age: 63
Discharge: HOME | End: 2023-11-07
Payer: MEDICARE

## 2023-11-07 ENCOUNTER — HOSPITAL ENCOUNTER (OUTPATIENT)
Dept: CARDIOLOGY | Facility: HOSPITAL | Age: 63
Discharge: HOME | End: 2023-11-07
Payer: MEDICARE

## 2023-11-07 VITALS
HEART RATE: 75 BPM | SYSTOLIC BLOOD PRESSURE: 172 MMHG | WEIGHT: 293 LBS | HEIGHT: 66 IN | DIASTOLIC BLOOD PRESSURE: 94 MMHG | TEMPERATURE: 98.3 F | BODY MASS INDEX: 47.09 KG/M2

## 2023-11-07 DIAGNOSIS — G47.33 OSA ON CPAP: ICD-10-CM

## 2023-11-07 DIAGNOSIS — E66.01 MORBID OBESITY WITH BMI OF 50.0-59.9, ADULT: ICD-10-CM

## 2023-11-07 DIAGNOSIS — Z01.818 PRE-OP EVALUATION: ICD-10-CM

## 2023-11-07 DIAGNOSIS — K21.9 GASTROESOPHAGEAL REFLUX DISEASE, UNSPECIFIED WHETHER ESOPHAGITIS PRESENT: ICD-10-CM

## 2023-11-07 DIAGNOSIS — I10 ESSENTIAL HYPERTENSION: ICD-10-CM

## 2023-11-07 DIAGNOSIS — E66.01 MORBID OBESITY WITH BMI OF 50.0-59.9, ADULT: Primary | ICD-10-CM

## 2023-11-07 PROBLEM — F41.8 DEPRESSION WITH ANXIETY: Status: ACTIVE | Noted: 2023-11-07

## 2023-11-07 PROBLEM — R60.9 EDEMA: Status: ACTIVE | Noted: 2023-11-07

## 2023-11-07 PROBLEM — E55.9 VITAMIN D DEFICIENCY: Status: ACTIVE | Noted: 2023-11-07

## 2023-11-07 PROBLEM — Z71.3 DIETARY COUNSELING: Status: ACTIVE | Noted: 2023-11-07

## 2023-11-07 PROBLEM — E66.813 CLASS 3 SEVERE OBESITY DUE TO EXCESS CALORIES WITHOUT SERIOUS COMORBIDITY WITH BODY MASS INDEX (BMI) OF 50.0 TO 59.9 IN ADULT: Status: RESOLVED | Noted: 2017-10-05 | Resolved: 2023-11-07

## 2023-11-07 LAB
QT INTERVAL: 396 MS
QTC INTERVAL: 415 MS

## 2023-11-07 PROCEDURE — 93005 ELECTROCARDIOGRAM TRACING: CPT | Performed by: NURSE PRACTITIONER

## 2023-11-07 PROCEDURE — 71046 X-RAY EXAM CHEST 2 VIEWS: CPT

## 2023-11-07 RX ORDER — SODIUM CHLORIDE, SODIUM LACTATE, POTASSIUM CHLORIDE, CALCIUM CHLORIDE 600; 310; 30; 20 MG/100ML; MG/100ML; MG/100ML; MG/100ML
30 INJECTION, SOLUTION INTRAVENOUS CONTINUOUS
OUTPATIENT
Start: 2023-11-07

## 2023-11-07 NOTE — PROGRESS NOTES
[facility]  [address] [facility] [address]  [facility] [id number] [phone]  [address] [facility] [address]  [facility] [id number] [phone]  Dept: [phone]  11/7/2023      Anita Estrada.  [id number]  [id number]  1960  female      Chief Complaint of weight gain; unable to maintain weight loss    History of Present Illness:   Anita is a 62 y.o. female who presents today for evaluation, education and consultation regarding weight loss surgery. The patient is interested in the sleeve gastrectomy.      Diet History:Anita has been overweight for at least 30 years, has been 35 pounds or more overweight for at least 30 years, has been 100 pounds or more overweight for 20 or more years and started dieting at age 14.  The most weight Anita lost was 60 pounds on reduced calorie and maintained the weight loss for a year. Anita describes her eating habits as volume, snacker and sweets eater. Anita Estrada has tried Atkins, Hilliards, reduced calorie, exercising, medications, and slim fast among others with success of losing up to 60 pounds, but in each instance regained the weight.    See dietician documentation for complete history.    Bariatric Surgery Evaluation: The patient is being seen for an initial visit for bariatric surgery evaluation.     Bariatric Co-morbidities:  sleep apnea, hypertension, GERD, edema, and depression    Patient Active Problem List   Diagnosis   • Arthritis   • Acid reflux   • Carpal tunnel syndrome   • DDD (degenerative disc disease), lumbar   • Eczema   • Essential hypertension   • Ingrown nail   • Limb swelling   • Lumbar back pain   • Migraine without aura   • Pain of foot   • Plantar fascial fibromatosis   • Pressure ulcer, stage 1   • Primary localized osteoarthrosis of left lower leg   • EVAN on CPAP   • Tinea unguium   • Primary osteoarthritis of left knee   • Chronic left shoulder pain   • Anxiety   • Morbid  obesity with BMI of 50.0-59.9, adult   • Vitamin D deficiency   • Edema   • Dietary counseling   • Pre-op evaluation   • Depression with anxiety       Past Medical History:   Diagnosis Date   • Acid reflux    • Arthritis    • Asthma    • Carpal tunnel syndrome    • Claustrophobia    • Eczema    • Essential hypertension 03/02/2015   • Foot pain, bilateral    • Hypertension    • Ingrowing nail 08/08/2018   • Limb swelling    • Lumbar back pain    • Migraine headache    • Obesity    • Plantar fascial fibromatosis of right foot 10/11/2018   • Pressure ulcer, stage 1    • Sleep apnea with use of continuous positive airway pressure (CPAP)    • Tinea unguium        Past Surgical History:   Procedure Laterality Date   • BREAST LUMPECTOMY Right 2011   • BREAST LUMPECTOMY     • CARPAL TUNNEL RELEASE Right 03/06/2015, 9/15/17   • DILATATION AND CURETTAGE  1976   • HYSTERECTOMY  1983   • SHOULDER ARTHROSCOPY Left        No Known Allergies      Current Outpatient Medications:   •  Diclofenac Sodium (VOLTAREN) 1 % gel gel, APPLY TOPICALLY FOUR TIMES A DAY AS DIRECTED, Disp: 100 g, Rfl: 3  •  fluticasone (Flonase) 50 MCG/ACT nasal spray, 1 spray into the nostril(s) as directed by provider Daily., Disp: 18.2 mL, Rfl: 10  •  gabapentin (NEURONTIN) 800 MG tablet, TAKE 1 TABLET BY MOUTH FOUR TIMES A DAY, Disp: 120 tablet, Rfl: 2  •  hydroCHLOROthiazide (HYDRODIURIL) 12.5 MG tablet, Take 1 tablet by mouth Daily., Disp: 90 tablet, Rfl: 3  •  ibuprofen (ADVIL,MOTRIN) 800 MG tablet, TAKE 1 TABLET BY MOUTH THREE TIMES A DAY AS NEEDED, Disp: 90 tablet, Rfl: 0  •  losartan (Cozaar) 25 MG tablet, Take 1 tablet by mouth Daily., Disp: 90 tablet, Rfl: 3  •  methocarbamol (ROBAXIN) 500 MG tablet, Take 1 tablet by mouth 3 (Three) Times a Day As Needed for Muscle Spasms., Disp: 90 tablet, Rfl: 3  •  vitamin D (ERGOCALCIFEROL) 1.25 MG (42961 UT) capsule capsule, Take 1 capsule by mouth 1 (One) Time Per Week., Disp: 15 capsule, Rfl: 1  No current  facility-administered medications for this visit.    Social History     Socioeconomic History   • Marital status:    Tobacco Use   • Smoking status: Former     Packs/day: 0.50     Years: 20.00     Additional pack years: 0.00     Total pack years: 10.00     Types: Cigarettes     Quit date:      Years since quittin.8   • Smokeless tobacco: Never   • Tobacco comments:     smoking since age 11   Vaping Use   • Vaping Use: Every day   • Substances: Nicotine, Flavoring   • Devices: Disposable   Substance and Sexual Activity   • Alcohol use: Yes     Comment: social   • Drug use: Never   • Sexual activity: Yes     Partners: Male     Birth control/protection: Hysterectomy       Family History   Problem Relation Age of Onset   • Arthritis Mother         Family history of certain chronic disabling diseases   • Osteoporosis Mother    • Cancer Father         unspecified   • Stroke Other         not specified   • Diabetes Other         unspecified type         Review of Systems:  Review of Systems   Musculoskeletal:  Positive for arthralgias.   All other systems reviewed and are negative.    Physical Exam:  Vital Signs:  Weight: (!) 150 kg (330 lb)   Body mass index is 53.29 kg/m².  Temp: 98.3 °F (36.8 °C)   Heart Rate: 75   BP: 172/94     Physical Exam  Vitals reviewed.   Constitutional:       Appearance: Normal appearance. She is well-developed. She is obese.   HENT:      Head: Normocephalic and atraumatic.   Cardiovascular:      Rate and Rhythm: Normal rate.   Pulmonary:      Effort: Pulmonary effort is normal.      Breath sounds: Normal breath sounds.   Abdominal:      General: Bowel sounds are normal. There is no distension.      Palpations: Abdomen is soft.      Tenderness: There is no abdominal tenderness.   Musculoskeletal:         General: Normal range of motion.   Skin:     General: Skin is warm and dry.   Neurological:      Mental Status: She is alert and oriented to person, place, and time.    Psychiatric:         Behavior: Behavior normal.         Thought Content: Thought content normal.         Judgment: Judgment normal.        Assessment:         Anita Estrada is a 62 y.o. year old female with medically complicated severe obesity. Weight: (!) 150 kg (330 lb), Body mass index is 53.29 kg/m². and weight related problems including sleep apnea, hypertension, GERD, edema, and depression.    I explained in detail, potential surgical options of interest to the patient including the RNY gastric bypass, sleeve gastrectomy, and gastric band while considering the patient's medical history. At this time, the patient expressed interest in the sleeve gastrectomy.  All of those procedures can be performed laparoscopically but there is a chance to convert to open if any technical challenges or complications do occur.  Bariatric surgery is not cosmetic surgery but rather a tool to help a patient make a life-long commitment lifestyle changes including diet, exercise, behavior changes, and taking supplemental vitamins and minerals. The risks, benefits, alternatives, and potential complications of all of the procedures were explained in detail including but not limited to failure to lose weight or gain weight and change in body image, metabolic complications. The patient was informed that surgery is a tool and requires lifestyle change including dietary modification to be successful. The patient was made aware of the need for vitamin supplementation after surgery due to the risk of deficiencies including but not limited to calcium, thiamine, vitamin B12, folate, iron, and anemia.    The patient was advised to start a high protein, low fat and low carbohydrate diet. The patient was given individualized information by our dietician along with handouts. The patient was encouraged to start routine exercise including but not limited to 150 minutes per week. The patient received a resistance band along with a handout of  exercises.     The patient was given information regarding the BESSIE educational video. BESSIE is an internet based educational video which explains the surgical procedure and answers basic questions regarding the procedure. The patient was provided with instructions and a password to watch the video.    Due to the patient's BMI, history and co-morbidities they are at a high risk for surgery and will obtain the following:  -The patient has been advised that a letter of medical support and a history and physical must be obtained from her primary care physician. The patient was given a copy of a sample form, that will suffice as their letter to take to their primary are provider.  -A referral for pre-operative psychological evaluation was ordered for the patient to evaluate candidacy as well as provide mental health support, should it be warranted before or after surgery.  -Pre-operative testing will be ordered to include: CXR, EKG. Previous results of testing were reviewed including CBC, CMP, TSH, b12, folate, vitamin d, A1c, FLP. Discussed with the patient the importance of smoking cessation, the time frame for quitting and the risks involved with smoking after bariatric surgery; patient understands they will be tested prior to surgery to verify cessation.    -Anita Estrada will be set up for a pre-operative diagnostic esophagogastroduodenoscopy with biopsy for evaluation. The risks and benefits of the procedure were discussed with the patient in detail and all questions were answered.  Possibility of perforation, bleeding, aspiration, anoxic brain injury, respiratory and/or cardiac arrest and death were discussed. The patient received handouts regarding her instructions and all questions were answered.    The patient understands the surgical procedures and the different surgical options that are available.  She understands the lifestyle changes that would be required after surgery and has agreed to participate in a  pre-operative and postoperative weight management program.  She also expressed understanding of possible risks, had several questions answered and desires to proceed.    I think she is a good candidate for this surgery, and is interested in a sleeve gastrectomy.    Encounter Diagnoses   Name Primary?   • Morbid obesity with BMI of 50.0-59.9, adult Yes   • Essential hypertension    • EVAN on CPAP    • Gastroesophageal reflux disease, unspecified whether esophagitis present    • Pre-op evaluation        Plan:    The consultation plan was reviewed with the patient.  Patient will have evaluations and follow up with bariatric dieticians and a psychologist before undergoing a multidisciplinary review of her candidacy.  We also discussed the weight loss requirement and rationale, as well as other program requirements to ensure the safest approach to surgery. We spent time discussing different surgical procedures and plan of care throughout their lifespan to ensure long term success in achieving and maintaining a healthier weight. Patient will proceed with preoperative lab work, radiology, and endoscopy after obtaining agreed upon clearances and letter of support from their primary care provider.     As this patient is a female of childbearing age, she was counseled regarding conception and pregnancy after surgery. Patient was advised that conception and pregnancy in the first 18 months post surgery is not advised due to increased metabolic demands required during pregnancy as well as increased risk of nutrient and vitamin deficiencies which could jeopardize fetal development and birth weight. N/A    Total encounter exceeded 60 minutes including reviewing their chart/outside medical records/previous visits, face to face time obtaining medical history and physical, reviewing surgical options and answering any questions, discussing pre-operative plan and requirements along with care coordination.         Joleen Covington,  APRN  11/7/2023

## 2023-11-10 ENCOUNTER — PATIENT ROUNDING (BHMG ONLY) (OUTPATIENT)
Dept: BARIATRICS/WEIGHT MGMT | Facility: CLINIC | Age: 63
End: 2023-11-10
Payer: MEDICARE

## 2023-11-10 NOTE — PROGRESS NOTES
Good afternoon,    My name is Ana Oliver, I am the Practice Manager for [facility].      I want to officially welcome you to our practice and ask about your recent visit.      If you could tell me about your recent visit with us. What things went well?      We're always looking for ways to make our patients experiences even better. Do you have recommendations on ways we may improve?      I appreciate you taking the time to answer a few questions today.      Thank you, and have a great day!  ]    Message was sent to the patient via [employer] for PATIENT ROUNDING for [facility] [place].

## 2023-11-15 ENCOUNTER — OFFICE VISIT (OUTPATIENT)
Dept: PODIATRY | Facility: CLINIC | Age: 63
End: 2023-11-15
Payer: MEDICARE

## 2023-11-15 VITALS
HEART RATE: 62 BPM | BODY MASS INDEX: 47.09 KG/M2 | OXYGEN SATURATION: 97 % | WEIGHT: 293 LBS | HEIGHT: 66 IN | DIASTOLIC BLOOD PRESSURE: 95 MMHG | SYSTOLIC BLOOD PRESSURE: 152 MMHG | TEMPERATURE: 98.4 F

## 2023-11-15 DIAGNOSIS — L60.0 ONYCHOCRYPTOSIS: Primary | ICD-10-CM

## 2023-11-15 DIAGNOSIS — B35.1 ONYCHOMYCOSIS: ICD-10-CM

## 2023-11-15 DIAGNOSIS — R26.2 DIFFICULTY WALKING: ICD-10-CM

## 2023-11-15 DIAGNOSIS — M79.672 FOOT PAIN, BILATERAL: ICD-10-CM

## 2023-11-15 DIAGNOSIS — M79.671 FOOT PAIN, BILATERAL: ICD-10-CM

## 2023-11-15 NOTE — PROGRESS NOTES
[facility] - PODIATRY    Today's Date: 11/15/23    Patient Name: Anita Estrada  MRN: [id number]  CSN: [id number]  PCP: Maris Thurman APRN, Last PCP Visit: 9/6/2023  Referring Provider: No ref. provider found    SUBJECTIVE     Chief Complaint   Patient presents with    Left Foot - Follow-up, Nail Problem    Right Foot - Follow-up, Nail Problem     HPI: Anita Estrada, a 62 y.o.female, comes to clinic.    New, Established, New Problem:  established   Location:  Toenails  Duration:   Greater than five years  Onset:  Gradual  Nature:  sore with palpation.  Stable, worsening, improving:   stable  Aggravating factors:  Pain with shoe gear and ambulation.  Previous Treatment:  debridement    Medical changes:  none.    Patient denies any fevers, chills, nausea, vomiting, shortness of breath, nor any other constitutional signs nor symptoms.       Past Medical History:   Diagnosis Date    Acid reflux     Arthritis     Asthma     Carpal tunnel syndrome     Claustrophobia     Eczema     Essential hypertension 03/02/2015    Foot pain, bilateral     Hypertension     Ingrowing nail 08/08/2018    Limb swelling     Lumbar back pain     Migraine headache     Obesity     Plantar fascial fibromatosis of right foot 10/11/2018    Pressure ulcer, stage 1     Sleep apnea with use of continuous positive airway pressure (CPAP)     Tinea unguium      Past Surgical History:   Procedure Laterality Date    BREAST LUMPECTOMY Right 2011    BREAST LUMPECTOMY      CARPAL TUNNEL RELEASE Right 03/06/2015, 9/15/17    DILATATION AND CURETTAGE  1976    HYSTERECTOMY  1983    SHOULDER ARTHROSCOPY Left      Family History   Problem Relation Age of Onset    Arthritis Mother         Family history of certain chronic disabling diseases    Osteoporosis Mother     Cancer Father         unspecified    Stroke Other         not specified    Diabetes Other         unspecified type     Social History     Socioeconomic History    Marital  status:    Tobacco Use    Smoking status: Former     Packs/day: 0.50     Years: 20.00     Additional pack years: 0.00     Total pack years: 10.00     Types: Cigarettes     Quit date:      Years since quittin.8    Smokeless tobacco: Never    Tobacco comments:     smoking since age 11   Vaping Use    Vaping Use: Every day    Substances: Nicotine, Flavoring    Devices: Disposable   Substance and Sexual Activity    Alcohol use: Yes     Comment: social    Drug use: Never    Sexual activity: Yes     Partners: Male     Birth control/protection: Hysterectomy     No Known Allergies  Current Outpatient Medications   Medication Sig Dispense Refill    Diclofenac Sodium (VOLTAREN) 1 % gel gel APPLY TOPICALLY FOUR TIMES A DAY AS DIRECTED 100 g 3    fluticasone (Flonase) 50 MCG/ACT nasal spray 1 spray into the nostril(s) as directed by provider Daily. 18.2 mL 10    gabapentin (NEURONTIN) 800 MG tablet TAKE 1 TABLET BY MOUTH FOUR TIMES A  tablet 2    hydroCHLOROthiazide (HYDRODIURIL) 12.5 MG tablet Take 1 tablet by mouth Daily. 90 tablet 3    ibuprofen (ADVIL,MOTRIN) 800 MG tablet TAKE 1 TABLET BY MOUTH THREE TIMES A DAY AS NEEDED 90 tablet 0    losartan (Cozaar) 25 MG tablet Take 1 tablet by mouth Daily. 90 tablet 3    methocarbamol (ROBAXIN) 500 MG tablet Take 1 tablet by mouth 3 (Three) Times a Day As Needed for Muscle Spasms. 90 tablet 3    vitamin D (ERGOCALCIFEROL) 1.25 MG (90587 UT) capsule capsule Take 1 capsule by mouth 1 (One) Time Per Week. 15 capsule 1     No current facility-administered medications for this visit.     Review of Systems   Constitutional: Negative.    Skin:         Painful toenails.   All other systems reviewed and are negative.      OBJECTIVE     Vitals:    11/15/23 1057   BP: 152/95   Pulse: 62   Temp: 98.4 °F (36.9 °C)   SpO2: 97%       Patient seen in no apparent distress.      PHYSICAL EXAM:     Foot/Ankle Exam    GENERAL  Appearance:  obese  Orientation:  AAOx3  Affect:   appropriate  Gait:  antalgic  Assistance:  cane use  Right shoe gear: casual shoe  Left shoe gear: casual shoe    VASCULAR     Right Foot Vascularity   Normal vascular exam    Dorsalis pedis:  2+  Posterior tibial:  2+  Skin temperature:  warm  Edema grading:  None  CFT:  < 3 seconds  Pedal hair growth:  Present  Varicosities:  none     Left Foot Vascularity   Normal vascular exam    Dorsalis pedis:  2+  Posterior tibial:  2+  Skin temperature:  warm  Edema gradin+ and non-pitting  CFT:  < 3 seconds  Pedal hair growth:  Present  Varicosities:  none     NEUROLOGIC     Right Foot Neurologic   Normal sensation    Light touch sensation: normal  Vibratory sensation: normal  Hot/Cold sensation: normal  Protective Sensation using [place] Monofilament:   Sites intact: 10  Sites tested: 10     Left Foot Neurologic   Normal sensation    Light touch sensation: normal  Vibratory sensation: normal  Hot/Cold sensation:  normal  Protective Sensation using [place] Monofilament:   Sites intact: 10  Sites tested: 10    MUSCLE STRENGTH     Right Foot Muscle Strength   Foot dorsiflexion:  4  Foot plantar flexion:  4  Foot inversion:  4  Foot eversion:  4     Left Foot Muscle Strength   Foot dorsiflexion:  4  Foot plantar flexion:  4  Foot inversion:  4  Foot eversion:  4    RANGE OF MOTION     Right Foot Range of Motion   Foot and ankle ROM within normal limits       Left Foot Range of Motion   Foot and ankle ROM within normal limits      DERMATOLOGIC      Right Foot Dermatologic   Skin  Right foot skin is intact.   Nails  1.  Positive for elongated, onychomycosis, abnormal thickness, subungual debris and ingrown toenail.  2.  Positive for elongated, onychomycosis, abnormal thickness, subungual debris and ingrown toenail.  3.  Positive for elongated, onychomycosis, abnormal thickness, subungual debris and ingrown toenail.  4.  Positive for elongated, onychomycosis, abnormal thickness, subungual debris and ingrown  toenail.     Left Foot Dermatologic   Skin  Left foot skin is intact.   Nails  1.  Positive for elongated, onychomycosis, abnormal thickness, subungual debris and ingrown toenail.  2.  Positive for elongated, onychomycosis, abnormal thickness, subungual debris and ingrown toenail.  3.  Positive for elongated, onychomycosis, abnormal thickness, subungual debris and ingrown toenail.  4.  Positive for elongated, onychomycosis, abnormally thick, subungual debris and ingrown toenail.  5.  Positive for elongated, onychomycosis, abnormally thick, subungual debris and ingrown toenail.    ASSESSMENT/PLAN     Diagnoses and all orders for this visit:    1. Onychocryptosis (Primary)    2. Foot pain, bilateral    3. Onychomycosis    4. Difficulty walking        Comprehensive lower extremity examination and evaluation was performed.    Discussed findings and treatment plan including risks, benefits, and treatment options with patient in detail. Patient agreed with treatment plan.    Toenails 1 through 5 on left and toenails 1 through 4 on right were debrided in thickness and length and then smoothed with a Dremel Tool.  Tolerated the procedure well without complications.    An After Visit Summary was printed and given to the patient at discharge, including (if requested) any available informative/educational handouts regarding diagnosis, treatment, or medications. All questions were answered to patient/family satisfaction. Should symptoms fail to improve or worsen they agree to call or return to clinic or to go to the Emergency Department. Discussed the importance of following up with any needed screening tests/labs/specialist appointments and any requested follow-up recommended by me today. Importance of maintaining follow-up discussed and patient accepts that missed appointments can delay diagnosis and potentially lead to worsening of conditions.    Return in about 9 weeks (around 1/17/2024) for Toenail Care., or sooner if  acute issues arise.    This document has been electronically signed by Ranulfo Huizar DPM on November 15, 2023 11:12 EST

## 2023-11-17 RX ORDER — SENNOSIDES 8.6 MG
650 CAPSULE ORAL EVERY 8 HOURS PRN
COMMUNITY

## 2023-11-20 ENCOUNTER — TELEPHONE (OUTPATIENT)
Dept: BARIATRICS/WEIGHT MGMT | Facility: CLINIC | Age: 63
End: 2023-11-20
Payer: MEDICARE

## 2023-12-01 ENCOUNTER — TELEPHONE (OUTPATIENT)
Dept: FAMILY MEDICINE CLINIC | Facility: CLINIC | Age: 63
End: 2023-12-01
Payer: MEDICARE

## 2023-12-01 NOTE — TELEPHONE ENCOUNTER
Patient dropped off bariatric surgery form and asked for us to fax back when done. Can we update patient when this has been done.

## 2023-12-05 ENCOUNTER — TELEPHONE (OUTPATIENT)
Dept: BARIATRICS/WEIGHT MGMT | Facility: CLINIC | Age: 63
End: 2023-12-05
Payer: MEDICARE

## 2023-12-05 DIAGNOSIS — F18.10 ABUSE OF SMOKED SUBSTANCE: ICD-10-CM

## 2023-12-05 DIAGNOSIS — Z01.818 PRE-OP EVALUATION: ICD-10-CM

## 2023-12-05 DIAGNOSIS — E66.01 MORBID OBESITY WITH BMI OF 50.0-59.9, ADULT: Primary | ICD-10-CM

## 2023-12-05 NOTE — TELEPHONE ENCOUNTER
Pt called regarding psych eval.: advised pt has some concerns  needs negative nicotine screen & to establish care with a psych provider

## 2023-12-06 ENCOUNTER — OFFICE VISIT (OUTPATIENT)
Dept: SLEEP MEDICINE | Facility: HOSPITAL | Age: 63
End: 2023-12-06
Payer: MEDICARE

## 2023-12-06 VITALS
WEIGHT: 293 LBS | HEIGHT: 66 IN | DIASTOLIC BLOOD PRESSURE: 90 MMHG | BODY MASS INDEX: 47.09 KG/M2 | SYSTOLIC BLOOD PRESSURE: 155 MMHG | HEART RATE: 82 BPM | OXYGEN SATURATION: 97 %

## 2023-12-06 DIAGNOSIS — I10 ESSENTIAL HYPERTENSION: ICD-10-CM

## 2023-12-06 DIAGNOSIS — E66.01 MORBID OBESITY WITH BMI OF 50.0-59.9, ADULT: ICD-10-CM

## 2023-12-06 DIAGNOSIS — G47.33 OSA ON CPAP: Primary | ICD-10-CM

## 2023-12-06 PROCEDURE — G0463 HOSPITAL OUTPT CLINIC VISIT: HCPCS

## 2023-12-06 NOTE — PROGRESS NOTES
"  [address] [address]  [place]   [place] [address]  Phone: [phone]  Fax: [phone]      SLEEP CLINIC FOLLOW UP PROGRESS NOTE.    Anita Estrada  [id number]   1960  63 y.o.  female      PCP: Maris Thurman APRN      Date of visit: 12/6/2023    Chief Complaint   Patient presents with    Sleep Apnea    Obesity       HPI:  This is a 63 y.o. years old patient is here for the management of obstructive sleep apnea.  Sleep apnea is moderate in severity with a AHI of 25/hr. Patient is using positive airway pressure therapy with CPAP 9 and the symptoms of sleep apnea have improved significantly on the therapy. Normally patient goes to bed at 11 PM and wakes up at 9 AM .  The patient wakes up 2 time(s) during the night and has no problem going back to sleep.  Feels refreshed after waking up.     Medications and allergies are reviewed by me and documented in the encounter.     SOCIAL (habits pertaining to sleep medicine)  History tobacco use:No   History of alcohol use: 0 per week  Caffeine use: 0     REVIEW OF SYSTEMS:   Pertaining positive symptoms are:  Stuart Sleepiness Scale :Total score: 12   Morning headache      PHYSICAL EXAMINATION:  CONSTITUTIONAL:  Vitals:    12/06/23 1300   BP: 155/90   Pulse: 82   SpO2: 97%   Weight: (!) 147 kg (323 lb)   Height: 167.6 cm (65.98\")    Body mass index is 52.16 kg/m².   NOSE: nasal passages are clear, No deformities noted   RESP SYSTEM: Not in any respiratory distress, no chest deformities noted,   CARDIOVASULAR: No edema noted  NEURO: Oriented x 3, gait normal,  Mood and affect appeared appropriate      Data reviewed:  The Smart card downloaded on 12/6/2023 has been reviewed independently by me for compliance and discussed the data with the patient.   Compliance; 100%  More than 4 hr use, 85%  Average use of the device 5 hours and 50 minutes per night  Residual AHI: 0.9 /hr (goal < 5.0 /hr)  Mask type: Nasal pillows  Device: " DreamStation 2  DME: [employer]      ASSESSMENT AND PLAN:  Obstructive sleep apnea ( G 47.33).  The symptoms of sleep apnea have improved with the device and the treatment.  Patient's compliance with the device is excellent for treatment of sleep apnea.  I have independently reviewed the smart card down load and discussed with the patient the download data and encouarged the patient to continue to use the device.The residual AHI is acceptable. The device is benefiting the patient and the device is medically necessary.  Without proper control of sleep apnea and good compliance there is a increased risk for hypertension, diabetes mellitus and nonrestorative sleep with hypersomnia which can increase risk for motor vehicle accidents.  Untreated sleep apnea is also a risk factor for development of atrial fibrillation, pulmonary hypertension, insulin resistance and stroke. The patient is also instructed to get the supplies from the [employer] company and and change them on a regular basis.  A prescription for supplies has been sent to the [employer] company.  I have also discussed the good sleep hygiene habits and adequate amount of sleep needed for good health.  Obesity  3 with BMI is Body mass index is 52.16 kg/m².. I have discuss the relationship between the weight and sleep apnea. The benefit of weight loss in reducing severity of sleep apnea was discussed. Discussed diet and exercise with the patient to achieve ideal BMI.  Morning headache.  Her blood pressure is slightly high.  She is not taking medications on a consistent basis so I reinforced that she should take the medication on a daily basis  Return in about 1 year (around 12/6/2024) for with smart card down load. . Patient's questions were answered.    12/6/2023  John Maldonado MD  Sleep Medicine.  Medical Director,   [facility], [facility] sleep [facility].

## 2023-12-07 ENCOUNTER — OFFICE VISIT (OUTPATIENT)
Dept: BARIATRICS/WEIGHT MGMT | Facility: CLINIC | Age: 63
End: 2023-12-07
Payer: MEDICARE

## 2023-12-07 VITALS
BODY MASS INDEX: 47.09 KG/M2 | TEMPERATURE: 97.3 F | HEART RATE: 74 BPM | SYSTOLIC BLOOD PRESSURE: 157 MMHG | WEIGHT: 293 LBS | DIASTOLIC BLOOD PRESSURE: 90 MMHG | HEIGHT: 66 IN

## 2023-12-07 DIAGNOSIS — E66.01 MORBID OBESITY WITH BMI OF 50.0-59.9, ADULT: Primary | ICD-10-CM

## 2023-12-07 DIAGNOSIS — I10 ESSENTIAL HYPERTENSION: ICD-10-CM

## 2023-12-07 DIAGNOSIS — Z71.3 DIETARY COUNSELING: ICD-10-CM

## 2023-12-07 NOTE — PROGRESS NOTES
[facility] BARIATRIC SURGERY  [address] [address]  [phone]  [address] [address]  [phone]  Dept: [phone]  12/7/2023      Anita Estrada.  [id number]  [id number]  1960  female      Chief Complaint   Patient presents with    Follow-up     Fup diet       The patient is here for initial appt of their pre-operative physician supervised diet. Today's weight is (!) 146 kg (321 lb) pounds and had a loss of 9 lbs. The patient states that she is following the recommendations given by our office and dietician including a high lean protein, low carb and low fat diet. We recommended adequate fruits and vegetable intake along with limited portion sizes. Patient is working on eliminating fast foods, fried foods, sweets and soda. Anita Estrada has been increasing her daily water intake. She has been exercising.    Patient states they have made positive changes including focusing on protein first with meals and not skipping meals. She has also been increasing her daily water intake.  The patient admits to be struggling with trying a protein shake.    Review of Systems   Constitutional:  Positive for appetite change. Negative for fatigue and unexpected weight change.   HENT: Negative.     Eyes: Negative.    Respiratory: Negative.     Cardiovascular: Negative.  Negative for leg swelling.   Gastrointestinal:  Negative for abdominal distention, abdominal pain, constipation, diarrhea, nausea and vomiting.   Genitourinary:  Negative for difficulty urinating, frequency and urgency.   Musculoskeletal:  Negative for back pain.   Skin: Negative.    Psychiatric/Behavioral: Negative.     All other systems reviewed and are negative.    Vitals:    12/07/23 1025   BP: 157/90   Pulse: 74   Temp: 97.3 °F (36.3 °C)     Patient Active Problem List   Diagnosis    Arthritis    Acid reflux    Carpal tunnel syndrome    DDD (degenerative  disc disease), lumbar    Eczema    Essential hypertension    Ingrown nail    Limb swelling    Lumbar back pain    Migraine without aura    Pain of foot    Plantar fascial fibromatosis    Pressure ulcer, stage 1    Primary localized osteoarthrosis of left lower leg    EVAN on CPAP    Tinea unguium    Primary osteoarthritis of left knee    Chronic left shoulder pain    Anxiety    Morbid obesity with BMI of 50.0-59.9, adult    Vitamin D deficiency    Edema    Dietary counseling    Pre-op evaluation    Depression with anxiety     Body mass index is 51.84 kg/m².    The following portions of the patient's history were reviewed and updated as appropriate: active problem list, medication list, allergies, notes from last encounter    Physical Exam  Vitals reviewed.   Constitutional:       General: She is not in acute distress.     Appearance: Normal appearance. She is morbidly obese.   HENT:      Head: Normocephalic and atraumatic.   Eyes:      General: No scleral icterus.     Pupils: Pupils are equal, round, and reactive to light.   Cardiovascular:      Rate and Rhythm: Normal rate and regular rhythm.   Pulmonary:      Effort: Pulmonary effort is normal. No respiratory distress.   Musculoskeletal:         General: Normal range of motion.      Cervical back: Normal range of motion and neck supple.   Neurological:      General: No focal deficit present.      Mental Status: She is alert and oriented to person, place, and time.   Psychiatric:         Mood and Affect: Mood normal.         Behavior: Behavior normal.         Discussion/Plan:  Obesity/Morbid Obesity: Currently the patient's weight is decreasing. There are no medications prescribed.Treatment plan includes prescribed diet, prescribed exercise regimen and behavior modification.    I reviewed the appropriate dietary choices with the patient and encouraged the necessary changes. Recommended at least 70 grams of protein per day, around 35 grams of fats and less than 100  grams of carbohydrates. Reviewed calorie intake if patient wanted to calorie count and/or had BMR. Instructed patient to drink half of body weight in ounces per day and exercise a minimum of 150 minutes per week including both cardio and strength training. Discussed the option of keeping a food journal which will help patient become more aware of the nutritional value of foods so they are more prepared after surgery.    The patient was given written materials from our office for education.   I answered all of the patients questions regarding dietary changes, exercise or surgical options.  The total time spent face to face was approximately 20 minutes.     Encounter Diagnoses   Name Primary?    Morbid obesity with BMI of 50.0-59.9, adult Yes    Essential hypertension     Dietary counseling

## 2023-12-08 NOTE — PROGRESS NOTES
Nutrition Services    Patient Name: Anita Estrada  YOB: 1960  MRN: [id number]  Date of Service: 12/11/23      ICD-10-CM ICD-9-CM   1. Super obese  E66.9 278.00      You have chosen to receive care through a MyChart video visit. Do you consent to use a MyChart video visit for your medical care today? Yes    RD Recommendation        Candidacy for surgery? Good   RD Comments Recommend patient for surgery     NUTRITION ASSESSMENT - BARIATRIC SURGERY      Reason for Visit RDN eval for surgery     H&P      Past Medical History:   Diagnosis Date    Acid reflux     Arthritis     Asthma     Carpal tunnel syndrome     Claustrophobia     Eczema     Essential hypertension 03/02/2015    Foot pain, bilateral     History of transfusion     1976,1983    Hypertension     Ingrowing nail 08/08/2018    Limb swelling     Lumbar back pain     Migraine headache     Obesity     Plantar fascial fibromatosis of right foot 10/11/2018    Pressure ulcer, stage 1     Sleep apnea with use of continuous positive airway pressure (CPAP)     Tinea unguium        Past Surgical History:   Procedure Laterality Date    BREAST LUMPECTOMY Right 2011    BREAST LUMPECTOMY      CARPAL TUNNEL RELEASE Right 03/06/2015, 9/15/17    DILATATION AND CURETTAGE  1976    HYSTERECTOMY  1983    SHOULDER ARTHROSCOPY Left         Previous Goals          Encounter Information        Visit Narrative     Patient reports she tried Ozempic recently but has been unable to get rx filled. States she did lose weight while on medication. Patient has tried the soup diet, egg diet with no results. Patient has been trying to work on portions recently. Patient bought kids plate with small utensils and has been trying to use more often. Patient has also been working on eating protein first.     Diet Recall:   Breakfast: egg with parnell and juice   Lunch: salad with chicken and sometimes broccoli  Dinner: salad with chicken or turkey  Snacks: cottage cheese with fruit,  "Greek yogurt  Beverages: water - around 64oz    Exercise: patient goes to senior day to do chair exercises 1 day each week    Supplements: no current MV, probiotic, vit C, vit D    Self Monitoring:          Anthropometrics        Current Height, Weight Height: 167.6 cm (65.98\")  Weight: (!) 146 kg (321 lb) (12/11/23 1430)            Wt Readings from Last 30 Encounters:   12/11/23 1430 (!) 146 kg (321 lb)   12/07/23 1025 (!) 146 kg (321 lb)   12/06/23 1300 (!) 147 kg (323 lb)   11/15/23 1057 (!) 149 kg (329 lb)   11/07/23 0711 (!) 150 kg (330 lb)   10/10/23 1258 (!) 155 kg (342 lb)   09/06/23 0949 (!) 149 kg (329 lb 1.6 oz)   06/05/23 0853 (!) 155 kg (340 lb 11.2 oz)   05/15/23 0958 (!) 156 kg (345 lb)   04/07/23 0955 (!) 152 kg (335 lb)   03/21/23 1008 (!) 152 kg (335 lb)   02/20/23 1035 (!) 152 kg (335 lb)   01/23/23 0938 (!) 158 kg (347 lb 6.4 oz)   01/10/23 1817 (!) 159 kg (350 lb 1.5 oz)   12/05/22 1100 (!) 155 kg (341 lb 3.2 oz)   12/04/22 1348 (!) 154 kg (339 lb 1.1 oz)   11/18/22 1656 (!) 153 kg (336 lb 9.6 oz)   11/15/22 0905 (!) 149 kg (329 lb)   11/10/22 0832 (!) 149 kg (329 lb 3.2 oz)   10/13/22 1100 (!) 155 kg (340 lb 14.4 oz)   10/10/22 1624 (!) 154 kg (339 lb 9.6 oz)   10/04/22 0841 (!) 154 kg (340 lb)   09/27/22 1523 (!) 154 kg (340 lb)   09/27/22 1451 (!) 156 kg (344 lb)   08/11/22 1935 (!) 156 kg (344 lb 5.7 oz)   08/09/22 1516 (!) 158 kg (348 lb)   08/01/22 1409 (!) 158 kg (348 lb)   07/13/22 0854 (!) 161 kg (354 lb 9.6 oz)   05/09/22 1429 (!) 158 kg (349 lb)   04/07/22 1506 (!) 158 kg (349 lb)      BMI kg/m2 Body mass index is 51.84 kg/m².       Nutrition Diagnosis         Nutrition Dx Statement Overweight/obesity RT multifactorial biochemical, behavioral and environmental contributors to disease AEB BMI 51.84 kg/m^2         Nutrition Intervention         Nutrition Intervention Nutrition education related to diet modification and physical activity        Monitor/Evaluation        New Goals Patient " to continue lifestyle changes made so far to prepare for surgery       Total time spent with pt 15 minutes of which 15 minutes were spent on education.       Electronically signed by:  Alfredo Quesada RD  12/11/23 14:33 EST

## 2023-12-11 ENCOUNTER — TELEMEDICINE (OUTPATIENT)
Dept: BARIATRICS/WEIGHT MGMT | Facility: CLINIC | Age: 63
End: 2023-12-11
Payer: MEDICARE

## 2023-12-11 VITALS — WEIGHT: 293 LBS | BODY MASS INDEX: 47.09 KG/M2 | HEIGHT: 66 IN

## 2023-12-11 DIAGNOSIS — E66.9 SUPER OBESE: Primary | ICD-10-CM

## 2023-12-11 PROCEDURE — 1160F RVW MEDS BY RX/DR IN RCRD: CPT | Performed by: DIETITIAN, REGISTERED

## 2023-12-11 PROCEDURE — 1159F MED LIST DOCD IN RCRD: CPT | Performed by: DIETITIAN, REGISTERED

## 2023-12-11 PROCEDURE — 97802 MEDICAL NUTRITION INDIV IN: CPT | Performed by: DIETITIAN, REGISTERED

## 2023-12-17 ENCOUNTER — HOSPITAL ENCOUNTER (EMERGENCY)
Facility: HOSPITAL | Age: 63
Discharge: HOME | End: 2023-12-17
Attending: EMERGENCY MEDICINE | Admitting: EMERGENCY MEDICINE
Payer: MEDICARE

## 2023-12-17 VITALS
HEART RATE: 76 BPM | OXYGEN SATURATION: 100 % | HEIGHT: 64 IN | SYSTOLIC BLOOD PRESSURE: 151 MMHG | RESPIRATION RATE: 24 BRPM | TEMPERATURE: 98.4 F | DIASTOLIC BLOOD PRESSURE: 76 MMHG | BODY MASS INDEX: 55.1 KG/M2

## 2023-12-17 DIAGNOSIS — J02.9 SORE THROAT: Primary | ICD-10-CM

## 2023-12-17 LAB
FLUAV SUBTYP SPEC NAA+PROBE: NOT DETECTED
FLUBV RNA ISLT QL NAA+PROBE: NOT DETECTED
RSV RNA NPH QL NAA+NON-PROBE: NOT DETECTED
S PYO AG THROAT QL: NEGATIVE
SARS-COV-2 RNA RESP QL NAA+PROBE: NOT DETECTED

## 2023-12-17 PROCEDURE — 99283 EMERGENCY DEPT VISIT LOW MDM: CPT

## 2023-12-17 PROCEDURE — 87880 STREP A ASSAY W/OPTIC: CPT | Performed by: EMERGENCY MEDICINE

## 2023-12-17 PROCEDURE — 87081 CULTURE SCREEN ONLY: CPT | Performed by: EMERGENCY MEDICINE

## 2023-12-17 PROCEDURE — 87637 SARSCOV2&INF A&B&RSV AMP PRB: CPT | Performed by: EMERGENCY MEDICINE

## 2023-12-17 NOTE — ED PROVIDER NOTES
Time: 5:58 PM EST  Date of encounter:  12/17/2023  Independent Historian/Clinical History and Information was obtained by:   Patient    History is limited by: N/A    Chief Complaint: Sore throat      History of Present Illness:  Patient is a 63 y.o. year old female who presents to the emergency department for evaluation of sore throat.  Patient states she has had right-sided sore throat for the past 2 to 3 days.  Patient states that she initially thought it was related to getting new dentures but she does not feel that her gums are irritated.  Patient does states that she is having some mild right-sided ear pain as well.  Patient is denying any fever.  She does state that she began having diarrhea yesterday but denies any nausea, vomiting, abdominal pain.  Patient denies chest pain or shortness of breath.  Patient does state that she has been exposed to RSV and COVID so she wanted to be evaluated to make sure that she is not contagious.    HPI    Patient Care Team  Primary Care Provider: Maris Thurman APRN    Past Medical History:     No Known Allergies  Past Medical History:   Diagnosis Date    Acid reflux     Arthritis     Asthma     Carpal tunnel syndrome     Claustrophobia     Eczema     Essential hypertension 03/02/2015    Foot pain, bilateral     History of transfusion     1976,1983    Hypertension     Ingrowing nail 08/08/2018    Limb swelling     Lumbar back pain     Migraine headache     Obesity     Plantar fascial fibromatosis of right foot 10/11/2018    Pressure ulcer, stage 1     Sleep apnea with use of continuous positive airway pressure (CPAP)     Tinea unguium      Past Surgical History:   Procedure Laterality Date    BREAST LUMPECTOMY Right 2011    BREAST LUMPECTOMY      CARPAL TUNNEL RELEASE Right 03/06/2015, 9/15/17    DILATATION AND CURETTAGE  1976    HYSTERECTOMY  1983    SHOULDER ARTHROSCOPY Left      Family History   Problem Relation Age of Onset    Arthritis Mother         Family history  of certain chronic disabling diseases    Osteoporosis Mother     Cancer Father         unspecified    Stroke Other         not specified    Diabetes Other         unspecified type    Malig Hyperthermia Neg Hx        Home Medications:  Prior to Admission medications    Medication Sig Start Date End Date Taking? Authorizing Provider   acetaminophen (TYLENOL) 650 MG 8 hr tablet Take 1 tablet by mouth Every 8 (Eight) Hours As Needed for Mild Pain.    Provider, MD Bravo   Diclofenac Sodium (VOLTAREN) 1 % gel gel APPLY TOPICALLY FOUR TIMES A DAY AS DIRECTED 23   Chad Todd MD   fluticasone (Flonase) 50 MCG/ACT nasal spray 1 spray into the nostril(s) as directed by provider Daily. 10/30/23   John Maldonado MD   gabapentin (NEURONTIN) 800 MG tablet TAKE 1 TABLET BY MOUTH FOUR TIMES A DAY 23   Maris Thurman APRN   hydroCHLOROthiazide (HYDRODIURIL) 12.5 MG tablet Take 1 tablet by mouth Daily. 23   Maris Thurman APRN   losartan (Cozaar) 25 MG tablet Take 1 tablet by mouth Daily. 23   Maris Thurman APRN   methocarbamol (ROBAXIN) 500 MG tablet Take 1 tablet by mouth 3 (Three) Times a Day As Needed for Muscle Spasms. 10/9/23   Maris Thurman APRN   vitamin D (ERGOCALCIFEROL) 1.25 MG (14640 UT) capsule capsule Take 1 capsule by mouth 1 (One) Time Per Week. 23   Maris Thurman APRN        Social History:   Social History     Tobacco Use    Smoking status: Every Day     Packs/day: 0.50     Years: 20.00     Additional pack years: 0.00     Total pack years: 10.00     Types: Cigarettes     Last attempt to quit:      Years since quittin.9    Smokeless tobacco: Never    Tobacco comments:     smoking since age 11   Vaping Use    Vaping Use: Former    Substances: Nicotine, Flavoring    Devices: Disposable   Substance Use Topics    Alcohol use: Yes     Comment: social    Drug use: Never         Review of Systems:  Review of Systems   Constitutional:  Negative for  "fever.   HENT:  Positive for ear pain and sore throat.    Respiratory:  Negative for cough and shortness of breath.    Cardiovascular:  Negative for chest pain.   Gastrointestinal:  Positive for diarrhea. Negative for abdominal pain, nausea and vomiting.   Neurological:  Negative for headaches.        Physical Exam:  /76 (BP Location: Left arm, Patient Position: Sitting)   Pulse 76   Temp 98.4 °F (36.9 °C) (Oral)   Resp 24   Ht 162.6 cm (64\")   SpO2 100%   BMI 55.10 kg/m²     Physical Exam  Vitals and nursing note reviewed.   Constitutional:       General: She is not in acute distress.     Appearance: Normal appearance. She is well-developed. She is obese. She is not ill-appearing, toxic-appearing or diaphoretic.   HENT:      Head: Normocephalic and atraumatic.      Right Ear: Tympanic membrane and ear canal normal.      Left Ear: Tympanic membrane and ear canal normal.      Nose: Nose normal. No congestion.      Mouth/Throat:      Mouth: Mucous membranes are moist. No oral lesions.      Pharynx: Oropharynx is clear. Uvula midline. No pharyngeal swelling, oropharyngeal exudate, posterior oropharyngeal erythema or uvula swelling.      Tonsils: No tonsillar exudate or tonsillar abscesses.   Eyes:      Extraocular Movements: Extraocular movements intact.      Conjunctiva/sclera: Conjunctivae normal.      Pupils: Pupils are equal, round, and reactive to light.   Cardiovascular:      Rate and Rhythm: Normal rate and regular rhythm.      Heart sounds: Normal heart sounds.   Pulmonary:      Effort: Pulmonary effort is normal.      Breath sounds: Normal breath sounds.   Abdominal:      General: Abdomen is flat. There is no distension.      Palpations: Abdomen is soft.   Musculoskeletal:         General: Normal range of motion.      Cervical back: Normal range of motion and neck supple.   Lymphadenopathy:      Cervical: No cervical adenopathy.   Skin:     General: Skin is warm and dry.   Neurological:      " General: No focal deficit present.      Mental Status: She is alert and oriented to person, place, and time.   Psychiatric:         Mood and Affect: Mood normal.         Behavior: Behavior normal.         Thought Content: Thought content normal.         Judgment: Judgment normal.                Procedures:  Procedures      Medical Decision Making:    Comorbidities that affect care:    Hypertension, Obesity    External Notes reviewed:    None      The following orders were placed and all results were independently analyzed by me:  Orders Placed This Encounter   Procedures    Rapid Strep A Screen - Swab, Throat    COVID PRE-OP / PRE-PROCEDURE SCREENING ORDER (NO ISOLATION) - Swab, Nasopharynx    COVID-19, FLU A/B, RSV PCR 1 HR TAT - Swab, Nasopharynx    Beta Strep Culture, Throat - Swab, Throat       Medications Given in the Emergency Department:  Medications - No data to display     ED Course:         Labs:    Lab Results (last 24 hours)       Procedure Component Value Units Date/Time    Rapid Strep A Screen - Swab, Throat [[id number]]  (Normal) Collected: 12/17/23 1735    Specimen: Swab from Throat Updated: 12/17/23 1839     Strep A Ag Negative    COVID PRE-OP / PRE-PROCEDURE SCREENING ORDER (NO ISOLATION) - Swab, Nasopharynx [[id number]]  (Normal) Collected: 12/17/23 1735    Specimen: Swab from Nasopharynx Updated: 12/17/23 1915    Narrative:      The following orders were created for panel order COVID PRE-OP / PRE-PROCEDURE SCREENING ORDER (NO ISOLATION) - Swab, Nasopharynx.  Procedure                               Abnormality         Status                     ---------                               -----------         ------                     COVID-19, FLU A/B, RSV P...[[id number]]  Normal              Final result                 Please view results for these tests on the individual orders.    COVID-19, FLU A/B, RSV PCR 1 HR TAT - Swab, Nasopharynx [[id number]]  (Normal) Collected: 12/17/23 1735    Specimen: Swab  from Nasopharynx Updated: 12/17/23 1915     COVID19 Not Detected     Influenza A PCR Not Detected     Influenza B PCR Not Detected     RSV, PCR Not Detected    Narrative:      Fact sheet for providers: https://www.fda.gov/media/148907/download    Fact sheet for patients: https://www.fda.gov/media/210494/download    Test performed by PCR.    Beta Strep Culture, Throat - Swab, Throat [[id number]] Collected: 12/17/23 1735    Specimen: Swab from Throat Updated: 12/17/23 1839             Imaging:    No Radiology Exams Resulted Within Past 24 Hours      Differential Diagnosis and Discussion:    Sore Throat: Differential diagnosis includes but is not limited to bacterial infection, viral infection, inhaled irritants, sinus drainage, thyroiditis, epiglottitis, and retropharyngeal abscess.    All labs were reviewed and interpreted by me.    MDM  Number of Diagnoses or Management Options  Sore throat  Diagnosis management comments: Patient presented to the emergency department today for evaluation of sore throat.  Rapid influenza, strep, COVID, and RSV testing are all negative.  We will send strep swab for culture due to patient's symptoms but I will not start any antibiotics at this time.       Amount and/or Complexity of Data Reviewed  Clinical lab tests: reviewed and ordered    Risk of Complications, Morbidity, and/or Mortality  Presenting problems: moderate  Diagnostic procedures: low  Management options: low    Patient Progress  Patient progress: stable       Patient Care Considerations:    ANTIBIOTICS: I considered prescribing antibiotics as an outpatient however no bacterial focus of infection was found.      Consultants/Shared Management Plan:    None    Social Determinants of Health:    Patient is independent, reliable, and has access to care.       Disposition and Care Coordination:    Discharged: The patient is suitable and stable for discharge with no need for consideration of observation or admission.    I have  explained the patient´s condition, diagnoses and treatment plan based on the information available to me at this time. I have answered questions and addressed any concerns. The patient has a good  understanding of the patient´s diagnosis, condition, and treatment plan as can be expected at this point. The vital signs have been stable. The patient´s condition is stable and appropriate for discharge from the emergency department.      The patient will pursue further outpatient evaluation with the primary care physician or other designated or consulting physician as outlined in the discharge instructions. They are agreeable to this plan of care and follow-up instructions have been explained in detail. The patient has received these instructions in written format and have expressed an understanding of the discharge instructions. The patient is aware that any significant change in condition or worsening of symptoms should prompt an immediate return to this or the closest emergency department or call to 1.  I have explained discharge medications and the need for follow up with the patient/caretakers. This was also printed in the discharge instructions. Patient was discharged with the following medications and follow up:      Medication List      No changes were made to your prescriptions during this visit.      Maris Thurman, CHYNA  [facility] [address] [address]  [phone]             Final diagnoses:   Sore throat        ED Disposition       ED Disposition   Discharge    Condition   Stable    Comment   --               This medical record created using voice recognition software.             Donny Smith PA-C  12/17/23 1934

## 2023-12-18 NOTE — DISCHARGE INSTRUCTIONS
Your flu, strep, COVID, and RSV testing are all negative in the emergency department today.  We will culture the strep swab to ensure that it is not a false negative.  Return to the emergency department for new or worsening symptoms concerning to you.

## 2023-12-19 LAB — BACTERIA SPEC AEROBE CULT: NORMAL

## 2024-01-05 ENCOUNTER — TELEPHONE (OUTPATIENT)
Dept: BARIATRICS/WEIGHT MGMT | Facility: CLINIC | Age: 64
End: 2024-01-05
Payer: MEDICARE

## 2024-01-05 NOTE — TELEPHONE ENCOUNTER
Spoke to patient and she needed to reschedule her EGD from 1/16 to 1/30 with RICK. Patient was informed that I will let her know by [employer] message for arrival time when we get a bit closer to the date.

## 2024-01-10 ENCOUNTER — OFFICE VISIT (OUTPATIENT)
Dept: FAMILY MEDICINE CLINIC | Facility: CLINIC | Age: 64
End: 2024-01-10
Payer: MEDICARE

## 2024-01-10 VITALS
WEIGHT: 293 LBS | DIASTOLIC BLOOD PRESSURE: 82 MMHG | OXYGEN SATURATION: 98 % | SYSTOLIC BLOOD PRESSURE: 140 MMHG | HEART RATE: 71 BPM | BODY MASS INDEX: 50.02 KG/M2 | HEIGHT: 64 IN | TEMPERATURE: 97.4 F

## 2024-01-10 DIAGNOSIS — Z00.00 MEDICARE ANNUAL WELLNESS VISIT, INITIAL: Primary | ICD-10-CM

## 2024-01-10 DIAGNOSIS — G62.9 POLYNEUROPATHY: ICD-10-CM

## 2024-01-10 DIAGNOSIS — I10 ESSENTIAL HYPERTENSION: ICD-10-CM

## 2024-01-10 PROCEDURE — 3079F DIAST BP 80-89 MM HG: CPT

## 2024-01-10 PROCEDURE — G0402 INITIAL PREVENTIVE EXAM: HCPCS

## 2024-01-10 PROCEDURE — 1159F MED LIST DOCD IN RCRD: CPT

## 2024-01-10 PROCEDURE — 1160F RVW MEDS BY RX/DR IN RCRD: CPT

## 2024-01-10 PROCEDURE — 1170F FXNL STATUS ASSESSED: CPT

## 2024-01-10 PROCEDURE — 3077F SYST BP >= 140 MM HG: CPT

## 2024-01-10 RX ORDER — IBUPROFEN 800 MG/1
800 TABLET ORAL EVERY 12 HOURS PRN
Qty: 60 TABLET | Refills: 2 | Status: SHIPPED | OUTPATIENT
Start: 2024-01-10

## 2024-01-10 RX ORDER — FLUTICASONE PROPIONATE 50 MCG
1 SPRAY, SUSPENSION (ML) NASAL DAILY
Qty: 18.2 ML | Refills: 10 | Status: SHIPPED | OUTPATIENT
Start: 2024-01-10

## 2024-01-10 RX ORDER — GABAPENTIN 800 MG/1
TABLET ORAL
Qty: 120 TABLET | Refills: 2 | OUTPATIENT
Start: 2024-01-10

## 2024-01-10 RX ORDER — IBUPROFEN 800 MG/1
800 TABLET ORAL EVERY 6 HOURS PRN
COMMUNITY
End: 2024-01-10 | Stop reason: SDUPTHER

## 2024-01-10 RX ORDER — HYDROCHLOROTHIAZIDE 12.5 MG/1
12.5 TABLET ORAL DAILY
Qty: 90 TABLET | Refills: 3 | Status: SHIPPED | OUTPATIENT
Start: 2024-01-10

## 2024-01-10 RX ORDER — GABAPENTIN 800 MG/1
800 TABLET ORAL 4 TIMES DAILY
Qty: 120 TABLET | Refills: 2 | Status: SHIPPED | OUTPATIENT
Start: 2024-01-10

## 2024-01-10 RX ORDER — ERGOCALCIFEROL 1.25 MG/1
50000 CAPSULE ORAL WEEKLY
Qty: 15 CAPSULE | Refills: 1 | Status: SHIPPED | OUTPATIENT
Start: 2024-01-10

## 2024-01-10 RX ORDER — LOSARTAN POTASSIUM 25 MG/1
25 TABLET ORAL DAILY
Qty: 90 TABLET | Refills: 3 | Status: SHIPPED | OUTPATIENT
Start: 2024-01-10

## 2024-01-10 NOTE — PROGRESS NOTES
The ABCs of the Annual Wellness Visit  [facility]    Subjective     Anita Estrada is a 63 y.o. female who presents for a  Welcome to Medicare Visit.    The following portions of the patient's history were reviewed and   updated as appropriate: allergies, current medications, past family history, past medical history, past social history, past surgical history, and problem list.     Compared to one year ago, the patient feels her physical   health is better.    Compared to one year ago, the patient feels her mental   health is better.    Recent Hospitalizations:  She was not admitted to the hospital during the last year.       Current Medical Providers:  Patient Care Team:  Maris Thurman APRN as PCP - General (Nurse Practitioner)  Ranulfo Huizar DPM as Consulting Physician (Podiatry)    Outpatient Medications Prior to Visit   Medication Sig Dispense Refill    acetaminophen (TYLENOL) 650 MG 8 hr tablet Take 1 tablet by mouth Every 8 (Eight) Hours As Needed for Mild Pain.      Diclofenac Sodium (VOLTAREN) 1 % gel gel APPLY TOPICALLY FOUR TIMES A DAY AS DIRECTED 100 g 3    fluticasone (Flonase) 50 MCG/ACT nasal spray 1 spray into the nostril(s) as directed by provider Daily. 18.2 mL 10    gabapentin (NEURONTIN) 800 MG tablet TAKE 1 TABLET BY MOUTH FOUR TIMES A  tablet 2    hydroCHLOROthiazide (HYDRODIURIL) 12.5 MG tablet Take 1 tablet by mouth Daily. 90 tablet 3    ibuprofen (ADVIL,MOTRIN) 800 MG tablet Take 1 tablet by mouth Every 6 (Six) Hours As Needed for Mild Pain.      losartan (Cozaar) 25 MG tablet Take 1 tablet by mouth Daily. 90 tablet 3    vitamin D (ERGOCALCIFEROL) 1.25 MG (64348 UT) capsule capsule Take 1 capsule by mouth 1 (One) Time Per Week. 15 capsule 1    methocarbamol (ROBAXIN) 500 MG tablet Take 1 tablet by mouth 3 (Three) Times a Day As Needed for Muscle Spasms. (Patient not taking: Reported on 1/10/2024) 90 tablet 3     No facility-administered medications  "prior to visit.       No opioid medication identified on active medication list. I have reviewed chart for other potential  high risk medication/s and harmful drug interactions in the elderly.        Aspirin is not on active medication list.  Aspirin use is not indicated based on review of current medical condition/s. Risk of harm outweighs potential benefits.  .    Patient Active Problem List   Diagnosis    Arthritis    Acid reflux    Carpal tunnel syndrome    DDD (degenerative disc disease), lumbar    Eczema    Essential hypertension    Ingrown nail    Limb swelling    Lumbar back pain    Migraine without aura    Pain of foot    Plantar fascial fibromatosis    Pressure ulcer, stage 1    Primary localized osteoarthrosis of left lower leg    EVAN on CPAP    Tinea unguium    Primary osteoarthritis of left knee    Chronic left shoulder pain    Anxiety    Morbid obesity with BMI of 50.0-59.9, adult    Vitamin D deficiency    Edema    Dietary counseling    Pre-op evaluation    Depression with anxiety     Advance Care Planning   Advance Care Planning     Advance Directive is not on file.  ACP discussion was declined by the patient. Patient does not have an advance directive, declines further assistance.       Objective   Vitals:    01/10/24 1440   BP: 140/82   BP Location: Left arm   Patient Position: Sitting   Cuff Size: Adult   Pulse: 71   Temp: 97.4 °F (36.3 °C)   TempSrc: Temporal   SpO2: 98%   Weight: (!) 151 kg (333 lb 6.4 oz)   Height: 162.6 cm (64\")     Estimated body mass index is 57.23 kg/m² as calculated from the following:    Height as of this encounter: 162.6 cm (64\").    Weight as of this encounter: 151 kg (333 lb 6.4 oz).           Does the patient have evidence of cognitive impairment?   No         Procedures       HEALTH RISK ASSESSMENT    Smoking Status:  Social History     Tobacco Use   Smoking Status Every Day    Packs/day: 0.50    Years: 20.00    Additional pack years: 0.00    Total pack years: 10.00 "    Types: Cigarettes    Last attempt to quit:     Years since quittin.0   Smokeless Tobacco Never   Tobacco Comments    smoking since age 11     Alcohol Consumption:  Social History     Substance and Sexual Activity   Alcohol Use Yes    Comment: social       Fall Risk Screen:    LEXIE Fall Risk Assessment was completed, and patient is at LOW risk for falls.Assessment completed on:1/10/2024    Depression Screen:       1/10/2024     2:45 PM   PHQ-2/PHQ-9 Depression Screening   Little Interest or Pleasure in Doing Things 0-->not at all   Feeling Down, Depressed or Hopeless 0-->not at all   PHQ-9: Brief Depression Severity Measure Score 0       Health Habits and Functional and Cognitive Screenin/10/2024     2:43 PM   Functional & Cognitive Status   Do you have difficulty preparing food and eating? No   Do you have difficulty bathing yourself, getting dressed or grooming yourself? No   Do you have difficulty using the toilet? No   Do you have difficulty moving around from place to place? No   Do you have trouble with steps or getting out of a bed or a chair? No   Current Diet Well Balanced Diet   Dental Exam Up to date   Eye Exam Up to date   Exercise (times per week) 2 times per week   Current Exercises Include Walking   Do you need help using the phone?  No   Are you deaf or do you have serious difficulty hearing?  No   Do you need help to go to places out of walking distance? No   Do you need help shopping? No   Do you need help preparing meals?  No   Do you need help with housework?  No   Do you need help with laundry? No   Do you need help taking your medications? No   Do you need help managing money? No   Do you ever drive or ride in a car without wearing a seat belt? No   Have you felt unusual stress, anger or loneliness in the last month? No   Who do you live with? Alone   If you need help, do you have trouble finding someone available to you? No   Have you been bothered in the last four weeks  by sexual problems? No   Do you have difficulty concentrating, remembering or making decisions? No       Visual Acuity:    Vision Screening    Right eye Left eye Both eyes   Without correction      With correction 20/40 20/30 20/40       Age-appropriate Screening Schedule:  Refer to the list below for future screening recommendations based on patient's age, sex and/or medical conditions. Orders for these recommended tests are listed in the plan section. The patient has been provided with a written plan.    Health Maintenance   Topic Date Due    DXA SCAN  Never done    DIABETIC FOOT EXAM  Never done    DIABETIC EYE EXAM  Never done    URINE MICROALBUMIN  02/09/2023    ZOSTER VACCINE (2 of 2) 01/10/2024 (Originally 11/25/2022)    COVID-19 Vaccine (4 - 2023-24 season) 01/12/2024 (Originally 9/1/2023)    Pneumococcal Vaccine 65+ (1 of 2 - PCV) 01/10/2025 (Originally 11/29/1966)    TDAP/TD VACCINES (1 - Tdap) 01/10/2025 (Originally 11/29/1977)    HEMOGLOBIN A1C  03/13/2024    LIPID PANEL  09/13/2024    BMI FOLLOWUP  12/11/2024    ANNUAL WELLNESS VISIT  01/10/2025    MAMMOGRAM  06/05/2025    COLORECTAL CANCER SCREENING  01/10/2030    HEPATITIS C SCREENING  Completed    INFLUENZA VACCINE  Completed        CMS Preventative Services Quick Reference  Risk Factors Identified During Encounter    Chronic Pain: Natural history and expected course discussed. Questions answered.  Inactivity/Sedentary: Patient was advised to exercise at least 150 minutes a week per CDC recommendations.  Tobacco Use/Dependance Risk Anita Estrada  reports that she has been smoking cigarettes. She has a 10.00 pack-year smoking history. She has never used smokeless tobacco.. I have educated her on the risk of diseases from using tobacco products such as cancer, COPD, and heart disease.     I advised her to quit and she is willing to quit. We have discussed the following method/s for tobacco cessation:  [place].  Together we have set a quit date  for  has already quit, maintaining cessation .  She will follow up with me in  as scheduled   or sooner to check on her progress.    I spent 5 minutes counseling the patient.        The above risks/problems have been discussed with the patient.  Pertinent information has been shared with the patient in the After Visit Summary.  Follow up plans and orders are seen below in the Assessment/Plan Section.    Diagnoses and all orders for this visit:    1. Medicare annual wellness visit, initial (Primary)    2. Polyneuropathy  -     gabapentin (NEURONTIN) 800 MG tablet; Take 1 tablet by mouth 4 (Four) Times a Day.  Dispense: 120 tablet; Refill: 2    3. Essential hypertension  -     losartan (Cozaar) 25 MG tablet; Take 1 tablet by mouth Daily.  Dispense: 90 tablet; Refill: 3  -     hydroCHLOROthiazide (HYDRODIURIL) 12.5 MG tablet; Take 1 tablet by mouth Daily.  Dispense: 90 tablet; Refill: 3    Other orders  -     ibuprofen (ADVIL,MOTRIN) 800 MG tablet; Take 1 tablet by mouth Every 12 (Twelve) Hours As Needed for Mild Pain.  Dispense: 60 tablet; Refill: 2  -     vitamin D (ERGOCALCIFEROL) 1.25 MG (33362 UT) capsule capsule; Take 1 capsule by mouth 1 (One) Time Per Week.  Dispense: 15 capsule; Refill: 1  -     fluticasone (Flonase) 50 MCG/ACT nasal spray; 1 spray into the nostril(s) as directed by provider Daily.  Dispense: 18.2 mL; Refill: 10        Follow Up:   Initial Medicare Visit in one year    An After Visit Summary and PPPS were made available to the patient.

## 2024-01-26 ENCOUNTER — TELEPHONE (OUTPATIENT)
Dept: BARIATRICS/WEIGHT MGMT | Facility: CLINIC | Age: 64
End: 2024-01-26
Payer: MEDICARE

## 2024-01-26 NOTE — TELEPHONE ENCOUNTER
Patient had a death in the family and needs to reschedule her EGD. We moved it from 1/30 to 2/20 with an arrival time of 10:15. Patient had no additional questions at this time.

## 2024-03-05 ENCOUNTER — TELEPHONE (OUTPATIENT)
Dept: FAMILY MEDICINE CLINIC | Facility: CLINIC | Age: 64
End: 2024-03-05
Payer: MEDICARE

## 2024-03-05 NOTE — TELEPHONE ENCOUNTER
Caller: Anita Estrada    Relationship to patient: Self    Best call back number: [phone]     Patient is needing: PATIENT IS INQUIRING HOW TO CONTACT HER HAND DOCTOR. PATIENT IS REQUESTING A CALL BACK FROM STAFF.

## 2024-03-06 ENCOUNTER — OFFICE VISIT (OUTPATIENT)
Dept: PODIATRY | Facility: CLINIC | Age: 64
End: 2024-03-06
Payer: MEDICARE

## 2024-03-06 VITALS
OXYGEN SATURATION: 96 % | SYSTOLIC BLOOD PRESSURE: 130 MMHG | BODY MASS INDEX: 54.41 KG/M2 | DIASTOLIC BLOOD PRESSURE: 88 MMHG | WEIGHT: 293 LBS | HEART RATE: 96 BPM | TEMPERATURE: 98.4 F

## 2024-03-06 DIAGNOSIS — M79.671 FOOT PAIN, BILATERAL: ICD-10-CM

## 2024-03-06 DIAGNOSIS — L60.0 ONYCHOCRYPTOSIS: Primary | ICD-10-CM

## 2024-03-06 DIAGNOSIS — M79.672 FOOT PAIN, BILATERAL: ICD-10-CM

## 2024-03-06 DIAGNOSIS — R26.2 DIFFICULTY WALKING: ICD-10-CM

## 2024-03-06 DIAGNOSIS — B35.1 ONYCHOMYCOSIS: ICD-10-CM

## 2024-03-06 PROCEDURE — 3079F DIAST BP 80-89 MM HG: CPT | Performed by: PODIATRIST

## 2024-03-06 PROCEDURE — 3075F SYST BP GE 130 - 139MM HG: CPT | Performed by: PODIATRIST

## 2024-03-06 PROCEDURE — 1160F RVW MEDS BY RX/DR IN RCRD: CPT | Performed by: PODIATRIST

## 2024-03-06 PROCEDURE — 11721 DEBRIDE NAIL 6 OR MORE: CPT | Performed by: PODIATRIST

## 2024-03-06 PROCEDURE — 1159F MED LIST DOCD IN RCRD: CPT | Performed by: PODIATRIST

## 2024-03-06 NOTE — PROGRESS NOTES
[facility] - PODIATRY    Today's Date: 03/06/24    Patient Name: Anita Estrada  MRN: [id number]  CSN: [id number]  PCP: Maris Thurman APRN, Last PCP Visit: 1/10/2024  Referring Provider: No ref. provider found    SUBJECTIVE     Chief Complaint   Patient presents with    Left Foot - Follow-up, Nail Problem    Right Foot - Follow-up, Nail Problem     HPI: Anita Estrada, a 63 y.o.female, comes to clinic.    New, Established, New Problem:  established   Location:  Toenails  Duration:   Greater than five years  Onset:  Gradual  Nature:  sore with palpation.  Stable, worsening, improving:   stable  Aggravating factors:  Pain with shoe gear and ambulation.  Previous Treatment:  debridement    Medical changes:  no changes    Patient denies any fevers, chills, nausea, vomiting, shortness of breath, nor any other constitutional signs nor symptoms.       Past Medical History:   Diagnosis Date    Acid reflux     Arthritis     Asthma     Carpal tunnel syndrome     Claustrophobia     Eczema     Essential hypertension 03/02/2015    Foot pain, bilateral     History of transfusion     1976,1983    Hypertension     Ingrowing nail 08/08/2018    Limb swelling     Lumbar back pain     Migraine headache     Obesity     Plantar fascial fibromatosis of right foot 10/11/2018    Pressure ulcer, stage 1     Sleep apnea with use of continuous positive airway pressure (CPAP)     Tinea unguium      Past Surgical History:   Procedure Laterality Date    BREAST LUMPECTOMY Right 2011    BREAST LUMPECTOMY      CARPAL TUNNEL RELEASE Right 03/06/2015, 9/15/17    DILATATION AND CURETTAGE  1976    HYSTERECTOMY  1983    SHOULDER ARTHROSCOPY Left      Family History   Problem Relation Age of Onset    Arthritis Mother         Family history of certain chronic disabling diseases    Osteoporosis Mother     Cancer Father         unspecified    Stroke Other         not specified    Diabetes Other         unspecified type    Malig  Hyperthermia Neg Hx      Social History     Socioeconomic History    Marital status:    Tobacco Use    Smoking status: Every Day     Current packs/day: 0.00     Average packs/day: 0.5 packs/day for 20.0 years (10.0 ttl pk-yrs)     Types: Cigarettes     Start date:      Last attempt to quit:      Years since quittin.1    Smokeless tobacco: Never    Tobacco comments:     smoking since age 11   Vaping Use    Vaping status: Former    Substances: Nicotine, Flavoring    Devices: Disposable   Substance and Sexual Activity    Alcohol use: Yes     Comment: social    Drug use: Never    Sexual activity: Yes     Partners: Male     Birth control/protection: Hysterectomy     No Known Allergies  Current Outpatient Medications   Medication Sig Dispense Refill    acetaminophen (TYLENOL) 650 MG 8 hr tablet Take 1 tablet by mouth Every 8 (Eight) Hours As Needed for Mild Pain.      Diclofenac Sodium (VOLTAREN) 1 % gel gel APPLY TOPICALLY FOUR TIMES A DAY AS DIRECTED 100 g 3    fluticasone (Flonase) 50 MCG/ACT nasal spray 1 spray into the nostril(s) as directed by provider Daily. 18.2 mL 10    gabapentin (NEURONTIN) 800 MG tablet Take 1 tablet by mouth 4 (Four) Times a Day. 120 tablet 2    hydroCHLOROthiazide (HYDRODIURIL) 12.5 MG tablet Take 1 tablet by mouth Daily. 90 tablet 3    ibuprofen (ADVIL,MOTRIN) 800 MG tablet Take 1 tablet by mouth Every 12 (Twelve) Hours As Needed for Mild Pain. 60 tablet 2    losartan (Cozaar) 25 MG tablet Take 1 tablet by mouth Daily. 90 tablet 3    vitamin D (ERGOCALCIFEROL) 1.25 MG (56368 UT) capsule capsule Take 1 capsule by mouth 1 (One) Time Per Week. 15 capsule 1     No current facility-administered medications for this visit.     Review of Systems   Constitutional: Negative.    Skin:         Painful toenails.   All other systems reviewed and are negative.      OBJECTIVE     Vitals:    24 1517   BP: 130/88   Pulse: 96   Temp: 98.4 °F (36.9 °C)   SpO2: 96%       Patient seen  in no apparent distress.      PHYSICAL EXAM:     Foot/Ankle Exam    GENERAL  Appearance:  obese  Orientation:  AAOx3  Affect:  appropriate  Gait:  antalgic  Assistance:  cane use  Right shoe gear: casual shoe  Left shoe gear: casual shoe    VASCULAR     Right Foot Vascularity   Normal vascular exam    Dorsalis pedis:  2+  Posterior tibial:  2+  Skin temperature:  warm  Edema grading:  None  CFT:  < 3 seconds  Pedal hair growth:  Present  Varicosities:  none     Left Foot Vascularity   Normal vascular exam    Dorsalis pedis:  2+  Posterior tibial:  2+  Skin temperature:  warm  Edema gradin+ and non-pitting  CFT:  < 3 seconds  Pedal hair growth:  Present  Varicosities:  none     NEUROLOGIC     Right Foot Neurologic   Normal sensation    Light touch sensation: normal  Vibratory sensation: normal  Hot/Cold sensation: normal  Protective Sensation using [place] Monofilament:   Sites intact: 10  Sites tested: 10     Left Foot Neurologic   Normal sensation    Light touch sensation: normal  Vibratory sensation: normal  Hot/Cold sensation:  normal  Protective Sensation using [place] Monofilament:   Sites intact: 10  Sites tested: 10    MUSCLE STRENGTH     Right Foot Muscle Strength   Foot dorsiflexion:  4  Foot plantar flexion:  4  Foot inversion:  4  Foot eversion:  4     Left Foot Muscle Strength   Foot dorsiflexion:  4  Foot plantar flexion:  4  Foot inversion:  4  Foot eversion:  4    RANGE OF MOTION     Right Foot Range of Motion   Foot and ankle ROM within normal limits       Left Foot Range of Motion   Foot and ankle ROM within normal limits      DERMATOLOGIC      Right Foot Dermatologic   Skin  Right foot skin is intact.   Nails  1.  Positive for elongated, onychomycosis, abnormal thickness, subungual debris and ingrown toenail.  2.  Positive for elongated, onychomycosis, abnormal thickness, subungual debris and ingrown toenail.  3.  Positive for elongated, onychomycosis, abnormal thickness,  subungual debris and ingrown toenail.  4.  Positive for elongated, onychomycosis, abnormal thickness, subungual debris and ingrown toenail.     Left Foot Dermatologic   Skin  Left foot skin is intact.   Nails  1.  Positive for elongated, onychomycosis, abnormal thickness, subungual debris and ingrown toenail.  2.  Positive for elongated, onychomycosis, abnormal thickness, subungual debris and ingrown toenail.  3.  Positive for elongated, onychomycosis, abnormal thickness, subungual debris and ingrown toenail.  4.  Positive for elongated, onychomycosis, abnormally thick, subungual debris and ingrown toenail.  5.  Positive for elongated, onychomycosis, abnormally thick, subungual debris and ingrown toenail.    I have reexamined the patient the results are consistent with the previously documented exam.    ASSESSMENT/PLAN     Diagnoses and all orders for this visit:    1. Onychocryptosis (Primary)    2. Foot pain, bilateral    3. Onychomycosis    4. Difficulty walking      Comprehensive lower extremity examination and evaluation was performed.    Discussed findings and treatment plan including risks, benefits, and treatment options with patient in detail. Patient agreed with treatment plan.    Toenails 1 through 5 on left and toenails 1 through 4 on right were debrided in thickness and length and then smoothed with a Dremel Tool.  Tolerated the procedure well without complications.    An After Visit Summary was printed and given to the patient at discharge, including (if requested) any available informative/educational handouts regarding diagnosis, treatment, or medications. All questions were answered to patient/family satisfaction. Should symptoms fail to improve or worsen they agree to call or return to clinic or to go to the Emergency Department. Discussed the importance of following up with any needed screening tests/labs/specialist appointments and any requested follow-up recommended by me today. Importance of  maintaining follow-up discussed and patient accepts that missed appointments can delay diagnosis and potentially lead to worsening of conditions.    Return in about 9 weeks (around 5/8/2024) for Toenail Care., or sooner if acute issues arise.    This document has been electronically signed by Ranulfo Huizar DPM on March 6, 2024 15:28 EST

## 2024-03-19 ENCOUNTER — HOSPITAL ENCOUNTER (OUTPATIENT)
Facility: HOSPITAL | Age: 64
Setting detail: HOSPITAL OUTPATIENT SURGERY
Discharge: HOME | End: 2024-03-19
Attending: SURGERY | Admitting: SURGERY
Payer: MEDICARE

## 2024-03-19 ENCOUNTER — ANESTHESIA (OUTPATIENT)
Dept: GASTROENTEROLOGY | Facility: HOSPITAL | Age: 64
End: 2024-03-19
Payer: MEDICARE

## 2024-03-19 ENCOUNTER — ANESTHESIA EVENT (OUTPATIENT)
Dept: GASTROENTEROLOGY | Facility: HOSPITAL | Age: 64
End: 2024-03-19
Payer: MEDICARE

## 2024-03-19 VITALS
OXYGEN SATURATION: 99 % | BODY MASS INDEX: 50.02 KG/M2 | HEIGHT: 64 IN | SYSTOLIC BLOOD PRESSURE: 121 MMHG | WEIGHT: 293 LBS | HEART RATE: 63 BPM | DIASTOLIC BLOOD PRESSURE: 80 MMHG | RESPIRATION RATE: 16 BRPM

## 2024-03-19 DIAGNOSIS — Z01.818 PRE-OP EVALUATION: ICD-10-CM

## 2024-03-19 DIAGNOSIS — E66.01 MORBID OBESITY WITH BMI OF 50.0-59.9, ADULT: ICD-10-CM

## 2024-03-19 DIAGNOSIS — K21.9 GASTROESOPHAGEAL REFLUX DISEASE, UNSPECIFIED WHETHER ESOPHAGITIS PRESENT: ICD-10-CM

## 2024-03-19 PROCEDURE — 43239 EGD BIOPSY SINGLE/MULTIPLE: CPT | Performed by: SURGERY

## 2024-03-19 PROCEDURE — 25010000002 PROPOFOL 10 MG/ML EMULSION: Performed by: ANESTHESIOLOGY

## 2024-03-19 PROCEDURE — 25810000003 LACTATED RINGERS PER 1000 ML: Performed by: SURGERY

## 2024-03-19 PROCEDURE — 87081 CULTURE SCREEN ONLY: CPT | Performed by: SURGERY

## 2024-03-19 PROCEDURE — S0260 H&P FOR SURGERY: HCPCS | Performed by: SURGERY

## 2024-03-19 RX ORDER — LIDOCAINE HYDROCHLORIDE 20 MG/ML
INJECTION, SOLUTION INFILTRATION; PERINEURAL AS NEEDED
Status: DISCONTINUED | OUTPATIENT
Start: 2024-03-19 | End: 2024-03-19 | Stop reason: SURG

## 2024-03-19 RX ORDER — PROPOFOL 10 MG/ML
VIAL (ML) INTRAVENOUS AS NEEDED
Status: DISCONTINUED | OUTPATIENT
Start: 2024-03-19 | End: 2024-03-19 | Stop reason: SURG

## 2024-03-19 RX ORDER — SODIUM CHLORIDE, SODIUM LACTATE, POTASSIUM CHLORIDE, CALCIUM CHLORIDE 600; 310; 30; 20 MG/100ML; MG/100ML; MG/100ML; MG/100ML
30 INJECTION, SOLUTION INTRAVENOUS CONTINUOUS PRN
Status: DISCONTINUED | OUTPATIENT
Start: 2024-03-19 | End: 2024-03-19 | Stop reason: HOSPADM

## 2024-03-19 RX ADMIN — PROPOFOL 200 MCG/KG/MIN: 10 INJECTION, EMULSION INTRAVENOUS at 13:51

## 2024-03-19 RX ADMIN — LIDOCAINE HYDROCHLORIDE 60 MG: 20 INJECTION, SOLUTION INFILTRATION; PERINEURAL at 13:51

## 2024-03-19 RX ADMIN — SODIUM CHLORIDE, POTASSIUM CHLORIDE, SODIUM LACTATE AND CALCIUM CHLORIDE 30 ML/HR: 600; 310; 30; 20 INJECTION, SOLUTION INTRAVENOUS at 13:34

## 2024-03-19 RX ADMIN — PROPOFOL 200 MG: 10 INJECTION, EMULSION INTRAVENOUS at 13:51

## 2024-03-19 NOTE — ANESTHESIA POSTPROCEDURE EVALUATION
Patient: Anita Estrada    Procedure Summary       Date: 03/19/24 Room / Location:  [facility]    Anesthesia Start: 1348 Anesthesia Stop: 1400    Procedure: ESOPHAGOGASTRODUODENOSCOPY WITH BIOPSY (Esophagus) Diagnosis:       Morbid obesity with BMI of 50.0-59.9, adult      Gastroesophageal reflux disease, unspecified whether esophagitis present      Pre-op evaluation      (Morbid obesity with BMI of 50.0-59.9, adult [E66.01, Z68.43])      (Gastroesophageal reflux disease, unspecified whether esophagitis present [K21.9])      (Pre-op evaluation [Z01.818])    Surgeons: Carmine Garcia Jr., MD Provider: Osvaldo Alvarado MD    Anesthesia Type: MAC ASA Status: 3            Anesthesia Type: MAC    Vitals  Vitals Value Taken Time   /67 03/19/24 1401   Temp     Pulse 60 03/19/24 1407   Resp 16 03/19/24 1401   SpO2 100 % 03/19/24 1407   Vitals shown include unfiled device data.        Post Anesthesia Care and Evaluation    Patient location during evaluation: PHASE II  Patient participation: complete - patient participated  Level of consciousness: awake and alert  Pain management: adequate    Airway patency: patent  Anesthetic complications: No anesthetic complications  PONV Status: none  Cardiovascular status: acceptable and hemodynamically stable  Respiratory status: acceptable, nonlabored ventilation and spontaneous ventilation  Hydration status: acceptable

## 2024-03-19 NOTE — ANESTHESIA PREPROCEDURE EVALUATION
Anesthesia Evaluation     Patient summary reviewed and Nursing notes reviewed   NPO Solid Status: > 8 hours  NPO Liquid Status: > 2 hours           Airway   Mallampati: III  TM distance: >3 FB  Neck ROM: full  Possible difficult intubation  Dental - normal exam     Pulmonary - normal exam    breath sounds clear to auscultation  (+) a smoker Current, asthma,sleep apnea on CPAP  Cardiovascular - normal exam    ECG reviewed  Rhythm: regular  Rate: normal    (+) hypertension 2 medications or greater      Neuro/Psych  (+) headaches, numbness, psychiatric history Anxiety and Depression    ROS Comment: CTS/migraines  GI/Hepatic/Renal/Endo    (+) obesity, morbid obesity, GERD    Musculoskeletal     (+) back pain      ROS comment: Lumbar DDD  Abdominal   (+) obese   Substance History      OB/GYN          Other   arthritis,                     Anesthesia Plan    ASA 3     MAC     intravenous induction     Anesthetic plan, risks, benefits, and alternatives have been provided, discussed and informed consent has been obtained with: patient.    CODE STATUS:

## 2024-03-19 NOTE — OP NOTE
Surgeon: Carmine Garcia Jr., M.D.    Preoperative Diagnosis: Dyspepsia    Postoperative Diagnosis: Gastritis    Procedure Performed: Transoral esophagogastroduodenoscopy with gastric antral biopsies    Indications: 63-year-old female presents for preoperative evaluation.  Patient does not take H2 blocker or PPI on regular basis    Procedure:     The procedure, indications, preparation and potential complications were explained to the patient, who indicated understanding and signed the corresponding consent forms.  The patient was identified, taken to the endoscopy suite, and placed on the left side down decubitus position.  The patient underwent a MAC anesthesia and was appropriately monitored through the case by the anesthesia personnel with continuous pulse oximetry, blood pressure, and cardiac monitoring.  A bite block was placed.  After adequate IV sedation and using a transoral technique a lubed flexible endoscope was placed in the hypopharynx and advanced to the second portion of the duodenum without difficulty. The scope was then withdrawn back into the antrum of the stomach.  Cold forcep biopsies of the antrum were taken to rule out Helicobacter pylori.  The scope was retroflexed noting the body, fundus and cardia.  The scope was then withdrawn back into the esophagus after decompressing the stomach.  The Z line was noted and GE junction measured from the incisors.  The scope was then completely withdrawn.  The patient tolerated the procedure well and left the endoscopy suite in stable condition.  The findings are listed below.    Duodenum: Unremarkable  Antrum: Patchy erythema  Body/Fundus: Patchy erythema  Cardia: Unremarkable  Esophagus: Z-line regular, no erosive esophagitis    Recommendations:     Await biopsy results and follow-up in the office

## 2024-03-19 NOTE — DISCHARGE INSTRUCTIONS
For the next 24 hours patient needs to be with a responsible adult.    For 24 hours DO NOT drive, operate machinery, appliances, drink alcohol, make important decisions or sign legal documents.    Start with a light or bland diet if you are feeling sick to your stomach otherwise advance to regular diet as tolerated.    Follow recommendations on procedure report if provided by your doctor.    Call Dr Garcia for problems [phone]    Problems may include but not limited to: large amounts of bleeding, trouble breathing, repeated vomiting, severe unrelieved pain, fever or chills.

## 2024-03-19 NOTE — H&P
Patient Care Team:  Maris Thurman APRN as PCP - General (Nurse Practitioner)  Ranulfo Huizar DPM as Consulting Physician (Podiatry)    Chief complaint Need of preoperative clearance prior to surgery    Subjective     Patient is a 63 y.o. female who is a patient of ours and has undergone our extensive initial evaluation for bariatric surgery and needs to proceed with upper endoscopy for preoperative clearance prior to proceeding with surgery.  Please see the initial history and physical for further detailed information.      Review of Systems   Pertinent items are noted in HPI and no changes since last visit.    Past Medical History:   Diagnosis Date    Acid reflux     Arthritis     Asthma     Carpal tunnel syndrome     Claustrophobia     Eczema     Essential hypertension 03/02/2015    Foot pain, bilateral     History of transfusion     1976,1983    Hypertension     Ingrowing nail 08/08/2018    Limb swelling     Lumbar back pain     Migraine headache     Obesity     Plantar fascial fibromatosis of right foot 10/11/2018    Pressure ulcer, stage 1     Sleep apnea with use of continuous positive airway pressure (CPAP)     Tinea unguium      Past Surgical History:   Procedure Laterality Date    BREAST LUMPECTOMY Right 2011    BREAST LUMPECTOMY      CARPAL TUNNEL RELEASE Right 03/06/2015, 9/15/17    DILATATION AND CURETTAGE  1976    HYSTERECTOMY  1983    SHOULDER ARTHROSCOPY Left      Family History   Problem Relation Age of Onset    Arthritis Mother         Family history of certain chronic disabling diseases    Osteoporosis Mother     Cancer Father         unspecified    Stroke Other         not specified    Diabetes Other         unspecified type    Malig Hyperthermia Neg Hx      Social History     Tobacco Use    Smoking status: Every Day     Current packs/day: 0.00     Average packs/day: 0.5 packs/day for 20.0 years (10.0 ttl pk-yrs)     Types: Cigarettes     Start date: 2002     Last attempt to quit:       Years since quittin.2    Smokeless tobacco: Never    Tobacco comments:     smoking since age 11   Vaping Use    Vaping status: Former    Substances: Nicotine, Flavoring    Devices: Disposable   Substance Use Topics    Alcohol use: Yes     Comment: social    Drug use: Never     Medications Prior to Admission   Medication Sig Dispense Refill Last Dose    acetaminophen (TYLENOL) 650 MG 8 hr tablet Take 1 tablet by mouth Every 8 (Eight) Hours As Needed for Mild Pain.       Diclofenac Sodium (VOLTAREN) 1 % gel gel APPLY TOPICALLY FOUR TIMES A DAY AS DIRECTED 100 g 3     fluticasone (Flonase) 50 MCG/ACT nasal spray 1 spray into the nostril(s) as directed by provider Daily. 18.2 mL 10     gabapentin (NEURONTIN) 800 MG tablet Take 1 tablet by mouth 4 (Four) Times a Day. 120 tablet 2     hydroCHLOROthiazide (HYDRODIURIL) 12.5 MG tablet Take 1 tablet by mouth Daily. 90 tablet 3     ibuprofen (ADVIL,MOTRIN) 800 MG tablet Take 1 tablet by mouth Every 12 (Twelve) Hours As Needed for Mild Pain. 60 tablet 2     losartan (Cozaar) 25 MG tablet Take 1 tablet by mouth Daily. 90 tablet 3     vitamin D (ERGOCALCIFEROL) 1.25 MG (83586 UT) capsule capsule Take 1 capsule by mouth 1 (One) Time Per Week. 15 capsule 1      Allergies:  Patient has no known allergies.    Vital Signs  See PreOp record            Physical Exam:   Heart: RR  Lungs: CTA B  Abd: soft and NT/ND  Ext: no clubbing, cyanosis    Results Review:    I have reviewed the patient's clinical results      Acid reflux    Morbid obesity with BMI of 50.0-59.9, adult    Pre-op evaluation      The risks and benefits of the procedure were discussed with the patient in detail and all questions were answered.  Possibility of perforation, bleeding, aspiration, anoxic brain injury, respiratory and/or cardiac arrest and death were discussed.  Consent will be signed and witnessed..     Carmine Garcia MD  24  12:20 EDT  Time: Approximately 15 minutes was spent with the  patient.  All of the patients questions were answered.

## 2024-03-20 LAB — UREASE TISS QL: NEGATIVE

## 2024-03-28 ENCOUNTER — TELEPHONE (OUTPATIENT)
Dept: ORTHOPEDIC SURGERY | Facility: CLINIC | Age: 64
End: 2024-03-28

## 2024-03-28 NOTE — TELEPHONE ENCOUNTER
Caller: Anita Estrada    Relationship to patient: Self    Best call back number: [phone]    Chief complaint: LEFT KNEE     Type of visit: HYALURONAN     Requested date: ASAP        Additional notes:LAST INJECTION 10/10/2023

## 2024-04-02 RX ORDER — IBUPROFEN 800 MG/1
800 TABLET ORAL EVERY 12 HOURS PRN
Qty: 60 TABLET | Refills: 0 | Status: SHIPPED | OUTPATIENT
Start: 2024-04-02

## 2024-04-09 ENCOUNTER — TELEPHONE (OUTPATIENT)
Dept: ORTHOPEDIC SURGERY | Facility: CLINIC | Age: 64
End: 2024-04-09

## 2024-04-10 ENCOUNTER — TELEPHONE (OUTPATIENT)
Dept: ORTHOPEDIC SURGERY | Facility: CLINIC | Age: 64
End: 2024-04-10
Payer: MEDICARE

## 2024-04-10 NOTE — TELEPHONE ENCOUNTER
APPT: LT KNEE EUFLEXXA INJ WITH [facility]  4-23 AT 1:30  4-30 AT 1:30   5-7 AT 1:30    LAST LT KNEE MONOVISC INJ:  10-10-23    [employer]

## 2024-04-10 NOTE — TELEPHONE ENCOUNTER
----- Message from Leslie Slater sent at 4/9/2024 11:01 AM EDT -----  Patient has been approved for Left Knee Euflexxa injections for dates:  04/15/24 to 10/12/24.  Auth #:  [id number].  Okay to schedule when appropriate.  She received Monovisc last time but because she has [employer] Medicare she has to do Euflexxa this time as that is now their preferred.

## 2024-04-23 ENCOUNTER — OFFICE VISIT (OUTPATIENT)
Dept: ORTHOPEDIC SURGERY | Facility: CLINIC | Age: 64
End: 2024-04-23
Payer: MEDICARE

## 2024-04-23 VITALS
DIASTOLIC BLOOD PRESSURE: 69 MMHG | SYSTOLIC BLOOD PRESSURE: 122 MMHG | HEART RATE: 83 BPM | BODY MASS INDEX: 50.02 KG/M2 | WEIGHT: 293 LBS | HEIGHT: 64 IN | OXYGEN SATURATION: 92 %

## 2024-04-23 DIAGNOSIS — M17.12 PRIMARY OSTEOARTHRITIS OF LEFT KNEE: Primary | ICD-10-CM

## 2024-04-23 PROCEDURE — 3074F SYST BP LT 130 MM HG: CPT

## 2024-04-23 PROCEDURE — 1160F RVW MEDS BY RX/DR IN RCRD: CPT

## 2024-04-23 PROCEDURE — 3078F DIAST BP <80 MM HG: CPT

## 2024-04-23 PROCEDURE — 20610 DRAIN/INJ JOINT/BURSA W/O US: CPT

## 2024-04-23 PROCEDURE — 1159F MED LIST DOCD IN RCRD: CPT

## 2024-04-23 RX ADMIN — Medication 20 MG: at 14:29

## 2024-04-23 NOTE — PROGRESS NOTES
"Chief Complaint  Follow-up of the Left Knee    Subjective      Anita Estrada presents to [facility] for follow-up of left knee osteoarthritis that she has been managing conservatively with intermittent injections.  She last received a gel injection on 10/10/2023 and is here today to receive her first Euflexxa injection.  Gel has worked well for her in the past.    Objective   No Known Allergies    Vital Signs:   /69   Pulse 83   Ht 162.6 cm (64\")   Wt (!) 145 kg (320 lb)   SpO2 92%   BMI 54.93 kg/m²       Physical Exam    Constitutional: Awake, alert. Well nourished appearance.    Integumentary: Warm, dry, intact. No obvious rashes.    HENT: Atraumatic, normocephalic.   Respiratory: Non labored respirations .   Cardiovascular: Intact peripheral pulses.    Psychiatric: Normal mood and affect. A&O X3    Ortho Exam  Left knee: Range of motion 0 to 120 degrees.  Stable to varus and valgus stress.  Stable to anterior and posterior drawer test.  Neurovascular intact.  Sensation is intact.  Tender to palpation the medial lateral joint line.  No edema noted.    Imaging Results (Most Recent)       None             Large Joint: L knee  Date/Time: 4/23/2024 2:29 PM  Consent given by: patient  Site marked: site marked  Timeout: Immediately prior to procedure a time out was called to verify the correct patient, procedure, equipment, support staff and site/side marked as required   Supporting Documentation  Indications: pain   Procedure Details  Location: knee - L knee  Preparation: Patient was prepped and draped in the usual sterile fashion  Needle gauge: 21G.  Medications administered: 20 mg Sodium Hyaluronate (Viscosup) 20 MG/2ML  Patient tolerance: patient tolerated the procedure well with no immediate complications         This injection documentation was Scribed for CHYNA Richard by Lashanda Mena CMA.  04/23/24   14:30 EDT      Assessment and Plan   Problem List Items " Addressed This Visit          Musculoskeletal and Injuries    Primary osteoarthritis of left knee - Primary    Relevant Orders    Large Joint: L knee       Follow Up   Return in about 1 week (around 2024).    Patient is a smoker, please discuss smoking cessation options with your PCP.    Social History     Socioeconomic History    Marital status:    Tobacco Use    Smoking status: Every Day     Current packs/day: 0.00     Average packs/day: 0.5 packs/day for 20.0 years (10.0 ttl pk-yrs)     Types: Cigarettes     Start date:      Last attempt to quit:      Years since quittin.3    Smokeless tobacco: Never    Tobacco comments:     smoking since age 11   Vaping Use    Vaping status: Former    Substances: Nicotine, Flavoring    Devices: Disposable   Substance and Sexual Activity    Alcohol use: Yes     Comment: social    Drug use: Never    Sexual activity: Yes     Partners: Male     Birth control/protection: Hysterectomy       Patient Instructions   Patient received first Euflexxa injection today in office into the left knee and tolerated the procedure well without complication.  Counseled that these injections can be given every 6 months and 1 day with insurance approval. Pt can also receive steroid injections intermittently between these doses.  Steroid injections can be given every 3 months.    Follow up in 1 week for repeat injection. Call with questions, concerns, or worsening symptoms.   Patient was given instructions and counseling regarding her condition or for health maintenance advice. Please see specific information pulled into the AVS if appropriate.

## 2024-04-25 ENCOUNTER — TELEMEDICINE (OUTPATIENT)
Dept: FAMILY MEDICINE CLINIC | Facility: CLINIC | Age: 64
End: 2024-04-25
Payer: MEDICARE

## 2024-04-25 DIAGNOSIS — Z78.0 POST-MENOPAUSAL: ICD-10-CM

## 2024-04-25 DIAGNOSIS — I10 ESSENTIAL HYPERTENSION: ICD-10-CM

## 2024-04-25 DIAGNOSIS — Z13.220 SCREENING FOR LIPID DISORDERS: ICD-10-CM

## 2024-04-25 DIAGNOSIS — M17.12 PRIMARY OSTEOARTHRITIS OF LEFT KNEE: ICD-10-CM

## 2024-04-25 DIAGNOSIS — M51.36 DDD (DEGENERATIVE DISC DISEASE), LUMBAR: ICD-10-CM

## 2024-04-25 DIAGNOSIS — E66.01 MORBID OBESITY WITH BMI OF 50.0-59.9, ADULT: Primary | ICD-10-CM

## 2024-04-25 DIAGNOSIS — R73.01 IMPAIRED FASTING GLUCOSE: ICD-10-CM

## 2024-04-25 DIAGNOSIS — R53.83 FATIGUE, UNSPECIFIED TYPE: ICD-10-CM

## 2024-04-25 DIAGNOSIS — M19.90 ARTHRITIS: ICD-10-CM

## 2024-04-25 DIAGNOSIS — E55.9 VITAMIN D DEFICIENCY: ICD-10-CM

## 2024-04-25 PROCEDURE — 99214 OFFICE O/P EST MOD 30 MIN: CPT

## 2024-04-25 PROCEDURE — 1159F MED LIST DOCD IN RCRD: CPT

## 2024-04-25 PROCEDURE — 1160F RVW MEDS BY RX/DR IN RCRD: CPT

## 2024-04-25 RX ORDER — IBUPROFEN 800 MG/1
800 TABLET ORAL EVERY 12 HOURS PRN
Qty: 60 TABLET | Refills: 2 | Status: SHIPPED | OUTPATIENT
Start: 2024-04-25

## 2024-04-25 NOTE — ASSESSMENT & PLAN NOTE
Patient is done really well with diet changes and increased exercise, will continue with this.  Patient still contemplating a bariatric surgery, which is an option for patient, but I do think she is doing great with the necessary lifestyle and diet changes that continuing with that may also provide weight loss as well.  Patient's choice, continue to follow-up with bariatric surgery if she would like.

## 2024-04-25 NOTE — PROGRESS NOTES
Anita Estrada presents to [facility] who presents via telehealth for 6-month follow-up.    You have chosen to receive care through a telephone visit. Do you consent to use a telephone visit for your medical care today? YES    Patient is present in her home alone, I am present in the office in a patient room alone.    This was an audio and video enabled telemedicine encounter.     History of Present Illness  This is a 63-year-old female who presents via telehealth for 6-month follow-up.    Obesity: Patient states that she is still following up with bariatric surgery, did have the upper scope performed and all of the routine testing that needs to be done prior to scheduling the actual bariatric surgery procedure.  States that she still talking to her psychiatrist, she has not completely determined if she is going to go through with this or not, but she states that she is doing really well and continues to lose weight.  Has actually lost already about 20 pounds, has drastically started to change her lifestyle, is adjusting the way she eats, the way she drinks, and states it is making a big difference and is actually something that she can follow through with for probably the rest of her life.  States that it is already made a difference on her knees, her shortness of breath, and she does feel better.  Is going to continue to think about this, but is not sure if she is actually going through surgery or not.    Arthritis: Patient states that she still has a lot of issues and pain out of both of her knees but she recently received an injection in her left knee which is already provided relief.  Had this done earlier this week, and is really happy with the result she has gotten from that.  She does still have quite a bit of chronic pain out of her back, her back is really not improved even with the weight loss, does still use the gabapentin but does need to, update urine drug screen and  consent for this.  Has actually had a cream in the past, but had gabapentin in it, that did give her quite a bit of relief.  Wants a refill of her ibuprofen, did discuss about previous elevation in kidney function, states that she understands this and understands the risks, but states is the only thing that gives her any relief from an arthritis standpoint.  Has tried and failed diclofenac and meloxicam in the past.    The following portions of the patient's history were personally reviewed and updated as appropriate: allergies, current medications, past medical history, past surgical history, past family history, and past social history.       Objective     All labs, imaging, test results, and specialty provider notes reviewed with patient.     Physical Exam  Vitals reviewed.   Constitutional:       Appearance: Normal appearance.   Cardiovascular:      Rate and Rhythm: Normal rate and regular rhythm.      Pulses: Normal pulses.      Heart sounds: Normal heart sounds.   Pulmonary:      Effort: Pulmonary effort is normal.      Breath sounds: Normal breath sounds.   Neurological:      General: No focal deficit present.      Mental Status: She is alert and oriented to person, place, and time.              Assessment and Plan:  Diagnoses and all orders for this visit:    1. Morbid obesity with BMI of 50.0-59.9, adult (Primary)  Assessment & Plan:  Patient is done really well with diet changes and increased exercise, will continue with this.  Patient still contemplating a bariatric surgery, which is an option for patient, but I do think she is doing great with the necessary lifestyle and diet changes that continuing with that may also provide weight loss as well.  Patient's choice, continue to follow-up with bariatric surgery if she would like.      2. Post-menopausal  -     DEXA Bone Density Axial    3. Arthritis  Assessment & Plan:  Getting better from a bilateral knee standpoint, especially her left knee, but her back  is still a problem.  We will prescribe an Rx alternative cream that will come through the mail, discussed this medication use and side effects.  Patient can continue her ibuprofen and gabapentin, did discuss the ibuprofen use of long-term and in relation to her kidney function.  She will get blood work done prior to her next visit, would ultimately like to get patient off of ibuprofen or at least not on as much routinely.  Continue with specialist referral to Ortho, increased exercise would be beneficial and weight loss would also help.    Orders:  -     Ibuprofen 3 %, Gabapentin 10 %, Baclofen 2 %, lidocaine 4 %; Apply 1-2 g topically to the appropriate area as directed 3 (Three) to 4 (Four) times daily.  Dispense: 90 g; Refill: 2    4. Primary osteoarthritis of left knee  -     Ibuprofen 3 %, Gabapentin 10 %, Baclofen 2 %, lidocaine 4 %; Apply 1-2 g topically to the appropriate area as directed 3 (Three) to 4 (Four) times daily.  Dispense: 90 g; Refill: 2    5. DDD (degenerative disc disease), lumbar  -     Ibuprofen 3 %, Gabapentin 10 %, Baclofen 2 %, lidocaine 4 %; Apply 1-2 g topically to the appropriate area as directed 3 (Three) to 4 (Four) times daily.  Dispense: 90 g; Refill: 2    6. Essential hypertension  -     CBC Auto Differential; Future  -     Comprehensive Metabolic Panel; Future  -     TSH Rfx On Abnormal To Free T4; Future  -     Urinalysis With Culture If Indicated -; Future    7. Vitamin D deficiency  -     Vitamin D,25-Hydroxy; Future    8. Screening for lipid disorders  -     Lipid Panel; Future    9. Fatigue, unspecified type  -     Vitamin B12 & Folate; Future    10. Impaired fasting glucose  -     Hemoglobin A1c; Future    Other orders  -     ibuprofen (ADVIL,MOTRIN) 800 MG tablet; Take 1 tablet by mouth Every 12 (Twelve) Hours As Needed for Mild Pain.  Dispense: 60 tablet; Refill: 2          Follow Up:  Return in about 6 months (around 10/25/2024).    Patient was given instructions and  counseling regarding her condition or for health maintenance advice. Please see specific information pulled into the AVS if appropriate.     Total Time Spent: 25 minutes

## 2024-04-25 NOTE — ASSESSMENT & PLAN NOTE
Getting better from a bilateral knee standpoint, especially her left knee, but her back is still a problem.  We will prescribe an Rx alternative cream that will come through the mail, discussed this medication use and side effects.  Patient can continue her ibuprofen and gabapentin, did discuss the ibuprofen use of long-term and in relation to her kidney function.  She will get blood work done prior to her next visit, would ultimately like to get patient off of ibuprofen or at least not on as much routinely.  Continue with specialist referral to Ortho, increased exercise would be beneficial and weight loss would also help.

## 2024-04-30 ENCOUNTER — OFFICE VISIT (OUTPATIENT)
Dept: ORTHOPEDIC SURGERY | Facility: CLINIC | Age: 64
End: 2024-04-30
Payer: MEDICARE

## 2024-04-30 VITALS — WEIGHT: 293 LBS | OXYGEN SATURATION: 96 % | HEIGHT: 64 IN | BODY MASS INDEX: 50.02 KG/M2 | HEART RATE: 79 BPM

## 2024-04-30 DIAGNOSIS — M17.12 PRIMARY OSTEOARTHRITIS OF LEFT KNEE: Primary | ICD-10-CM

## 2024-04-30 PROCEDURE — 1160F RVW MEDS BY RX/DR IN RCRD: CPT

## 2024-04-30 PROCEDURE — 20610 DRAIN/INJ JOINT/BURSA W/O US: CPT

## 2024-04-30 PROCEDURE — 1159F MED LIST DOCD IN RCRD: CPT

## 2024-04-30 RX ORDER — HYALURONATE SODIUM 10 MG/ML
20 SYRINGE (ML) INTRAARTICULAR
Status: COMPLETED | OUTPATIENT
Start: 2024-04-23 | End: 2024-04-23

## 2024-04-30 RX ADMIN — Medication 20 MG: at 13:38

## 2024-04-30 NOTE — PATIENT INSTRUCTIONS
Patient received first Euflexxa injection today in office into the left knee and tolerated the procedure well without complication.  Counseled that these injections can be given every 6 months and 1 day with insurance approval. Pt can also receive steroid injections intermittently between these doses.  Steroid injections can be given every 3 months.    Follow up in 1 week for repeat injection. Call with questions, concerns, or worsening symptoms.

## 2024-04-30 NOTE — PROGRESS NOTES
"Chief Complaint  Follow-up and Pain of the Left Knee    Subjective      Anita Estrada presents to [facility] for follow-up of left knee osteoarthritis that she has been managing conservatively.  She is here today to receive her second Euflexxa injection.  Does not notice significant improvement after first injection.    Objective   No Known Allergies    Vital Signs:   Pulse 79   Ht 162.6 cm (64\")   Wt (!) 145 kg (320 lb)   SpO2 96%   BMI 54.93 kg/m²       Physical Exam    Constitutional: Awake, alert. Well nourished appearance.    Integumentary: Warm, dry, intact. No obvious rashes.    HENT: Atraumatic, normocephalic.   Respiratory: Non labored respirations .   Cardiovascular: Intact peripheral pulses.    Psychiatric: Normal mood and affect. A&O X3    Ortho Exam  Left knee: Range of motion 0 to 120 degrees.  Stable to varus and valgus stress.  Stable to anterior and posterior drawer test.  Neurovascular intact.  Sensation is intact.  Tender to palpation the medial lateral joint line.  No edema noted.       Imaging Results (Most Recent)       None             Large Joint Arthrocentesis: L knee  Date/Time: 4/30/2024 1:38 PM  Consent given by: patient  Site marked: site marked  Timeout: Immediately prior to procedure a time out was called to verify the correct patient, procedure, equipment, support staff and site/side marked as required   Supporting Documentation  Indications: pain   Procedure Details  Location: knee - L knee  Needle gauge: 21g.  Medications administered: 20 mg Sodium Hyaluronate (Viscosup) 20 MG/2ML  Patient tolerance: patient tolerated the procedure well with no immediate complications              Assessment and Plan   Problem List Items Addressed This Visit          Musculoskeletal and Injuries    Primary osteoarthritis of left knee - Primary    Relevant Orders    Large Joint Arthrocentesis: L knee       Follow Up   Return in about 1 week (around " 2024).    Patient is a smoker, please discuss smoking cessation options with your PCP.    Social History     Socioeconomic History    Marital status:    Tobacco Use    Smoking status: Every Day     Current packs/day: 0.00     Average packs/day: 0.5 packs/day for 20.0 years (10.0 ttl pk-yrs)     Types: Cigarettes     Start date:      Last attempt to quit:      Years since quittin.3    Smokeless tobacco: Never    Tobacco comments:     smoking since age 11   Vaping Use    Vaping status: Former    Substances: Nicotine, Flavoring    Devices: Disposable   Substance and Sexual Activity    Alcohol use: Yes     Comment: social    Drug use: Never    Sexual activity: Yes     Partners: Male     Birth control/protection: Hysterectomy       Patient Instructions   Patient received second Euflexxa injection today in office into the left knee and tolerated the procedure well without complication.  Counseled that these injections can be given every 6 months and 1 day with insurance approval. Pt can also receive steroid injections intermittently between these doses.  Steroid injections can be given every 3 months.    Follow up in 1 week for third Euflexxa injection. Call with questions, concerns, or worsening symptoms.   Patient was given instructions and counseling regarding her condition or for health maintenance advice. Please see specific information pulled into the AVS if appropriate.

## 2024-05-07 ENCOUNTER — OFFICE VISIT (OUTPATIENT)
Dept: ORTHOPEDIC SURGERY | Facility: CLINIC | Age: 64
End: 2024-05-07
Payer: MEDICARE

## 2024-05-07 VITALS
BODY MASS INDEX: 50.02 KG/M2 | HEART RATE: 71 BPM | OXYGEN SATURATION: 95 % | DIASTOLIC BLOOD PRESSURE: 82 MMHG | HEIGHT: 64 IN | WEIGHT: 293 LBS | SYSTOLIC BLOOD PRESSURE: 136 MMHG

## 2024-05-07 DIAGNOSIS — M17.12 PRIMARY OSTEOARTHRITIS OF LEFT KNEE: Primary | ICD-10-CM

## 2024-05-07 PROCEDURE — 3075F SYST BP GE 130 - 139MM HG: CPT

## 2024-05-07 PROCEDURE — 1160F RVW MEDS BY RX/DR IN RCRD: CPT

## 2024-05-07 PROCEDURE — 20610 DRAIN/INJ JOINT/BURSA W/O US: CPT

## 2024-05-07 PROCEDURE — 1159F MED LIST DOCD IN RCRD: CPT

## 2024-05-07 PROCEDURE — 3079F DIAST BP 80-89 MM HG: CPT

## 2024-05-07 RX ADMIN — Medication 20 MG: at 13:23

## 2024-05-07 NOTE — PROGRESS NOTES
"Chief Complaint  Follow-up and Pain of the Left Knee    Subjective      Anita Estrada presents to [facility] for follow-up of left knee osteoarthritis she has been managing conservatively with intermittent injections.  She is here to receive her third Euflexxa injection.  Reports that she has noticed improvement after the first 2 injections.    Objective   No Known Allergies    Vital Signs:   /82   Pulse 71   Ht 162.6 cm (64\")   Wt (!) 145 kg (320 lb)   SpO2 95%   BMI 54.93 kg/m²       Physical Exam    Constitutional: Awake, alert. Well nourished appearance.    Integumentary: Warm, dry, intact. No obvious rashes.    HENT: Atraumatic, normocephalic.   Respiratory: Non labored respirations .   Cardiovascular: Intact peripheral pulses.    Psychiatric: Normal mood and affect. A&O X3    Ortho Exam  Left knee: Range of motion 0 to 120 degrees.  Stable to varus and valgus stress.  Stable anterior posterior drawer test.  Neurovascular intact.  Sensation is intact.  Tender to palpation medial lateral joint line.  No edema noted.    Imaging Results (Most Recent)       None             Large Joint: L knee  Date/Time: 5/7/2024 1:23 PM  Consent given by: patient  Site marked: site marked  Timeout: Immediately prior to procedure a time out was called to verify the correct patient, procedure, equipment, support staff and site/side marked as required   Supporting Documentation  Indications: pain   Procedure Details  Location: knee - L knee  Preparation: Patient was prepped and draped in the usual sterile fashion  Needle gauge: 21G.  Medications administered: 20 mg Sodium Hyaluronate (Viscosup) 20 MG/2ML  Patient tolerance: patient tolerated the procedure well with no immediate complications      This injection documentation was Scribed for CHYNA Richard by Lashanda Mena CMA.  05/07/24   13:24 EDT         Assessment and Plan   Problem List Items Addressed This Visit          " Musculoskeletal and Injuries    Primary osteoarthritis of left knee - Primary    Relevant Orders    Large Joint: L knee       Follow Up   Return if symptoms worsen or fail to improve.    Patient is a smoker, please discuss smoking cessation options with your PCP.    Social History     Socioeconomic History    Marital status:    Tobacco Use    Smoking status: Every Day     Current packs/day: 0.00     Average packs/day: 0.5 packs/day for 20.0 years (10.0 ttl pk-yrs)     Types: Cigarettes     Start date:      Last attempt to quit:      Years since quittin.3    Smokeless tobacco: Never    Tobacco comments:     smoking since age 11   Vaping Use    Vaping status: Former    Substances: Nicotine, Flavoring    Devices: Disposable   Substance and Sexual Activity    Alcohol use: Yes     Comment: social    Drug use: Never    Sexual activity: Yes     Partners: Male     Birth control/protection: Hysterectomy       Patient Instructions   Patient received third Euflexxa injection today in office into the left knee and tolerated the procedure well without complication.  Counseled that these injections can be given every 6 months and 1 day with insurance approval. Pt can also receive steroid injections intermittently between these doses.  Steroid injections can be given every 3 months.    Follow up as needed for worsening symptoms or failure to improve. Call with questions, concerns, or worsening symptoms.   Patient was given instructions and counseling regarding her condition or for health maintenance advice. Please see specific information pulled into the AVS if appropriate.

## 2024-05-14 RX ORDER — HYALURONATE SODIUM 10 MG/ML
20 SYRINGE (ML) INTRAARTICULAR
Status: COMPLETED | OUTPATIENT
Start: 2024-04-30 | End: 2024-04-30

## 2024-05-14 NOTE — PATIENT INSTRUCTIONS
Patient received second Euflexxa injection today in office into the left knee and tolerated the procedure well without complication.  Counseled that these injections can be given every 6 months and 1 day with insurance approval. Pt can also receive steroid injections intermittently between these doses.  Steroid injections can be given every 3 months.    Follow up in 1 week for third Euflexxa injection. Call with questions, concerns, or worsening symptoms.

## 2024-05-20 RX ORDER — HYALURONATE SODIUM 10 MG/ML
20 SYRINGE (ML) INTRAARTICULAR
Status: COMPLETED | OUTPATIENT
Start: 2024-05-07 | End: 2024-05-07

## 2024-05-20 NOTE — PATIENT INSTRUCTIONS
Patient received third Euflexxa injection today in office into the left knee and tolerated the procedure well without complication.  Counseled that these injections can be given every 6 months and 1 day with insurance approval. Pt can also receive steroid injections intermittently between these doses.  Steroid injections can be given every 3 months.    Follow up as needed for worsening symptoms or failure to improve. Call with questions, concerns, or worsening symptoms.

## 2024-06-05 ENCOUNTER — OFFICE VISIT (OUTPATIENT)
Dept: PODIATRY | Facility: CLINIC | Age: 64
End: 2024-06-05
Payer: MEDICARE

## 2024-06-05 VITALS
DIASTOLIC BLOOD PRESSURE: 71 MMHG | OXYGEN SATURATION: 94 % | SYSTOLIC BLOOD PRESSURE: 105 MMHG | WEIGHT: 293 LBS | BODY MASS INDEX: 55.1 KG/M2 | HEART RATE: 86 BPM | TEMPERATURE: 98.5 F

## 2024-06-05 DIAGNOSIS — M79.672 FOOT PAIN, BILATERAL: ICD-10-CM

## 2024-06-05 DIAGNOSIS — L60.0 ONYCHOCRYPTOSIS: Primary | ICD-10-CM

## 2024-06-05 DIAGNOSIS — R26.2 DIFFICULTY WALKING: ICD-10-CM

## 2024-06-05 DIAGNOSIS — B35.1 ONYCHOMYCOSIS: ICD-10-CM

## 2024-06-05 DIAGNOSIS — M79.671 FOOT PAIN, BILATERAL: ICD-10-CM

## 2024-06-05 NOTE — PROGRESS NOTES
[facility] - PODIATRY    Today's Date: 06/05/24    Patient Name: Anita Estrada  MRN: [id number]  CSN: [id number]  PCP: Maris Thurman APRN, Last PCP Visit: 4/24/2024  Referring Provider: No ref. provider found    SUBJECTIVE     Chief Complaint   Patient presents with    Left Foot - Follow-up, Nail Problem    Right Foot - Follow-up, Nail Problem     HPI: Anita Estrada, a 63 y.o.female, comes to clinic.    New, Established, New Problem:  established   Location:  Toenails  Duration:   Greater than five years  Onset:  Gradual  Nature:  sore with palpation.  Stable, worsening, improving:   stable  Aggravating factors:  Pain with shoe gear and ambulation.  Previous Treatment:  debridement    Medical changes:  none    Patient denies any fevers, chills, nausea, vomiting, shortness of breath, nor any other constitutional signs nor symptoms.       I have reviewed/confirmed previously documented HPI with no changes.     Past Medical History:   Diagnosis Date    Acid reflux     Arthritis     Asthma     Carpal tunnel syndrome     Claustrophobia     Eczema     Essential hypertension 03/02/2015    Foot pain, bilateral     History of transfusion     1976,1983    Hypertension     Ingrowing nail 08/08/2018    Limb swelling     Lumbar back pain     Migraine headache     Obesity     Plantar fascial fibromatosis of right foot 10/11/2018    Pressure ulcer, stage 1     Sleep apnea with use of continuous positive airway pressure (CPAP)     Tinea unguium      Past Surgical History:   Procedure Laterality Date    BREAST LUMPECTOMY Right 2011    BREAST LUMPECTOMY      CARPAL TUNNEL RELEASE Right 03/06/2015, 9/15/17    DILATATION AND CURETTAGE  1976    ENDOSCOPY N/A 3/19/2024    Procedure: ESOPHAGOGASTRODUODENOSCOPY WITH BIOPSY;  Surgeon: Carmine Garcia Jr., MD;  Location: [facility] ENDOSCOPY;  Service: General;  Laterality: N/A;  PRE: DYSPEPSIA /  POST: GASTRITIS    HYSTERECTOMY  1983    SHOULDER ARTHROSCOPY Left       Family History   Problem Relation Age of Onset    Arthritis Mother         Family history of certain chronic disabling diseases    Osteoporosis Mother     Cancer Father         unspecified    Stroke Other         not specified    Diabetes Other         unspecified type    Malig Hyperthermia Neg Hx      Social History     Socioeconomic History    Marital status:    Tobacco Use    Smoking status: Every Day     Current packs/day: 0.00     Average packs/day: 0.5 packs/day for 20.0 years (10.0 ttl pk-yrs)     Types: Cigarettes     Start date:      Last attempt to quit:      Years since quittin.4    Smokeless tobacco: Never    Tobacco comments:     smoking since age 11   Vaping Use    Vaping status: Former    Substances: Nicotine, Flavoring    Devices: Disposable   Substance and Sexual Activity    Alcohol use: Yes     Comment: social    Drug use: Never    Sexual activity: Yes     Partners: Male     Birth control/protection: Hysterectomy     No Known Allergies  Current Outpatient Medications   Medication Sig Dispense Refill    acetaminophen (TYLENOL) 650 MG 8 hr tablet Take 1 tablet by mouth Every 8 (Eight) Hours As Needed for Mild Pain.      Diclofenac Sodium (VOLTAREN) 1 % gel gel APPLY TOPICALLY FOUR TIMES A DAY AS DIRECTED 100 g 3    fluticasone (Flonase) 50 MCG/ACT nasal spray 1 spray into the nostril(s) as directed by provider Daily. 18.2 mL 10    gabapentin (NEURONTIN) 800 MG tablet Take 1 tablet by mouth 4 (Four) Times a Day. 120 tablet 2    hydroCHLOROthiazide (HYDRODIURIL) 12.5 MG tablet Take 1 tablet by mouth Daily. 90 tablet 3    ibuprofen (ADVIL,MOTRIN) 800 MG tablet Take 1 tablet by mouth Every 12 (Twelve) Hours As Needed for Mild Pain. 60 tablet 2    Ibuprofen 3 %, Gabapentin 10 %, Baclofen 2 %, lidocaine 4 % Apply 1-2 g topically to the appropriate area as directed 3 (Three) to 4 (Four) times daily. 90 g 2    losartan (Cozaar) 25 MG tablet Take 1 tablet by mouth Daily. 90 tablet 3     vitamin D (ERGOCALCIFEROL) 1.25 MG (62201 UT) capsule capsule Take 1 capsule by mouth 1 (One) Time Per Week. 15 capsule 1     No current facility-administered medications for this visit.     Review of Systems   Constitutional: Negative.    Skin:         Painful toenails.   All other systems reviewed and are negative.      OBJECTIVE     Vitals:    24 1519   BP: 105/71   Pulse: 86   Temp: 98.5 °F (36.9 °C)   SpO2: 94%       Patient seen in no apparent distress.      PHYSICAL EXAM:     Foot/Ankle Exam    GENERAL  Appearance:  obese  Orientation:  AAOx3  Affect:  appropriate  Gait:  antalgic  Assistance:  cane use  Right shoe gear: casual shoe  Left shoe gear: casual shoe    VASCULAR     Right Foot Vascularity   Normal vascular exam    Dorsalis pedis:  2+  Posterior tibial:  2+  Skin temperature:  warm  Edema grading:  None  CFT:  < 3 seconds  Pedal hair growth:  Present  Varicosities:  none     Left Foot Vascularity   Normal vascular exam    Dorsalis pedis:  2+  Posterior tibial:  2+  Skin temperature:  warm  Edema gradin+ and non-pitting  CFT:  < 3 seconds  Pedal hair growth:  Present  Varicosities:  none     NEUROLOGIC     Right Foot Neurologic   Normal sensation    Light touch sensation: normal  Vibratory sensation: normal  Hot/Cold sensation: normal  Protective Sensation using [place] Monofilament:   Sites intact: 10  Sites tested: 10     Left Foot Neurologic   Normal sensation    Light touch sensation: normal  Vibratory sensation: normal  Hot/Cold sensation:  normal  Protective Sensation using [place] Monofilament:   Sites intact: 10  Sites tested: 10    MUSCLE STRENGTH     Right Foot Muscle Strength   Foot dorsiflexion:  4  Foot plantar flexion:  4  Foot inversion:  4  Foot eversion:  4     Left Foot Muscle Strength   Foot dorsiflexion:  4  Foot plantar flexion:  4  Foot inversion:  4  Foot eversion:  4    RANGE OF MOTION     Right Foot Range of Motion   Foot and ankle ROM within  normal limits       Left Foot Range of Motion   Foot and ankle ROM within normal limits      DERMATOLOGIC      Right Foot Dermatologic   Skin  Right foot skin is intact.   Nails  1.  Positive for elongated, onychomycosis, abnormal thickness, subungual debris and ingrown toenail.  2.  Positive for elongated, onychomycosis, abnormal thickness, subungual debris and ingrown toenail.  3.  Positive for elongated, onychomycosis, abnormal thickness, subungual debris and ingrown toenail.  4.  Positive for elongated, onychomycosis, abnormal thickness, subungual debris and ingrown toenail.     Left Foot Dermatologic   Skin  Left foot skin is intact.   Nails  1.  Positive for elongated, onychomycosis, abnormal thickness, subungual debris and ingrown toenail.  2.  Positive for elongated, onychomycosis, abnormal thickness, subungual debris and ingrown toenail.  3.  Positive for elongated, onychomycosis, abnormal thickness, subungual debris and ingrown toenail.  4.  Positive for elongated, onychomycosis, abnormally thick, subungual debris and ingrown toenail.  5.  Positive for elongated, onychomycosis, abnormally thick, subungual debris and ingrown toenail.    I have reexamined the patient the results are consistent with the previously documented exam.    ASSESSMENT/PLAN     Diagnoses and all orders for this visit:    1. Onychocryptosis (Primary)    2. Foot pain, bilateral    3. Onychomycosis    4. Difficulty walking      Comprehensive lower extremity examination and evaluation was performed.    Discussed findings and treatment plan including risks, benefits, and treatment options with patient in detail. Patient agreed with treatment plan.    Toenails 1 through 5 on left and toenails 1 through 4 on right were debrided in thickness and length and then smoothed with a Dremel Tool.  Tolerated the procedure well without complications.    An After Visit Summary was printed and given to the patient at discharge, including (if requested)  any available informative/educational handouts regarding diagnosis, treatment, or medications. All questions were answered to patient/family satisfaction. Should symptoms fail to improve or worsen they agree to call or return to clinic or to go to the Emergency Department. Discussed the importance of following up with any needed screening tests/labs/specialist appointments and any requested follow-up recommended by me today. Importance of maintaining follow-up discussed and patient accepts that missed appointments can delay diagnosis and potentially lead to worsening of conditions.    Return in about 9 weeks (around 8/7/2024) for Toenail Care., or sooner if acute issues arise.    I have reviewed the assessment and plan and verified the accuracy of it. No changes to assessment and plan since the information was documented. Ranulfo Huizar DPM 06/05/24     I have dictated this note utilizing Dragon Dictation.  Please note that portions of this note were completed with a voice recognition program.  Part of this note may be an electronic transcription/translation of spoken language to printed text using the Dragon Dictation System.      This document has been electronically signed by Ranulfo Huizar DPM on June 5, 2024 15:57 EDT

## 2024-06-06 ENCOUNTER — OFFICE VISIT (OUTPATIENT)
Dept: ORTHOPEDIC SURGERY | Facility: CLINIC | Age: 64
End: 2024-06-06
Payer: MEDICARE

## 2024-06-06 VITALS
HEART RATE: 68 BPM | WEIGHT: 293 LBS | HEIGHT: 64 IN | DIASTOLIC BLOOD PRESSURE: 74 MMHG | SYSTOLIC BLOOD PRESSURE: 120 MMHG | OXYGEN SATURATION: 94 % | BODY MASS INDEX: 50.02 KG/M2

## 2024-06-06 DIAGNOSIS — M25.552 LEFT HIP PAIN: Primary | ICD-10-CM

## 2024-06-06 DIAGNOSIS — M54.42 LEFT-SIDED LOW BACK PAIN WITH LEFT-SIDED SCIATICA, UNSPECIFIED CHRONICITY: ICD-10-CM

## 2024-06-06 NOTE — PROGRESS NOTES
"Chief Complaint  Initial Evaluation of the Left Hip     Subjective      Anita Estrada presents to [facility] for initial evaluation of the left hip. She ambulates with a cane for support and stability.  She had injections in her knees of the knees 5/7/24.  She is having pain in the low back and down the leg.  She had no injury or falls. She has pain with sleeping. She has had no recent injury or fall.       No Known Allergies     Social History     Socioeconomic History    Marital status:    Tobacco Use    Smoking status: Former     Current packs/day: 0.50     Average packs/day: 0.5 packs/day for 37.4 years (18.7 ttl pk-yrs)     Types: Cigarettes     Start date: 1974     Quit date: 2008    Smokeless tobacco: Never   Vaping Use    Vaping status: Former    Substances: Nicotine, Flavoring    Devices: Disposable   Substance and Sexual Activity    Alcohol use: Yes     Comment: social    Drug use: Never    Sexual activity: Yes     Partners: Male     Birth control/protection: Hysterectomy        I reviewed the patient's chief complaint, history of present illness, review of systems, past medical history, surgical history, family history, social history, medications, and allergy list.     Review of Systems     Constitutional: Denies fevers, chills, weight loss  Cardiovascular: Denies chest pain, shortness of breath  Skin: Denies rashes, acute skin changes  Neurologic: Denies headache, loss of consciousness        Vital Signs:   /74   Pulse 68   Ht 162.6 cm (64.02\")   Wt (!) 146 kg (321 lb)   SpO2 94%   BMI 55.07 kg/m²          Physical Exam  General: Alert. No acute distress    Ortho Exam        LEFT HIP  No skin discoloration, atrophy or swelling. Positive EHL, FHL, GS, and TA. Sensation intact to all 5 nerves of the foot. Negative straight leg raise. Leg lengths appear equal. Ambulates with Antalgic gait Negative Lissa. Negative Rosalind. Good strength to hamstrings, " quadriceps, dorsiflexors, and plantar flexors. Knee Extensor Mechanism  intact        Procedures      Imaging Results (Most Recent)       None             Result Review :       X-Ray Report:  Left hip X-Ray  Indication: Evaluation of the left hip  AP/Lateral view(s)  Findings: Mild degenerative arthritis.  No acute fracture or dislocation.   Prior studies available for comparison: no             Assessment and Plan     Diagnoses and all orders for this visit:    1. Left hip pain (Primary)  -     XR Hip With or Without Pelvis 2 - 3 View Left; Future    2. Left-sided low back pain with left-sided sciatica, unspecified chronicity        Discussed the treatment plan with the patient. I reviewed the X-rays that were obtained today with the patient.     HEP exercises.  She denies steroid injection today.      Call or return if worsening symptoms.    Follow Up     PRN      Patient was given instructions and counseling regarding her condition or for health maintenance advice. Please see specific information pulled into the AVS if appropriate.     Scribed for Chad Todd MD by Tania Sumner MA.  06/06/24   10:22 EDT    I have personally performed the services described in this document as scribed by the above individual and it is both accurate and complete. Chad Todd MD 06/06/24

## 2024-06-13 DIAGNOSIS — F18.10 ABUSE OF SMOKED SUBSTANCE: Primary | ICD-10-CM

## 2024-06-13 DIAGNOSIS — Z01.818 PRE-OP EVALUATION: ICD-10-CM

## 2024-06-28 ENCOUNTER — TRANSCRIBE ORDERS (OUTPATIENT)
Dept: ADMINISTRATIVE | Facility: HOSPITAL | Age: 64
End: 2024-06-28
Payer: MEDICARE

## 2024-06-28 DIAGNOSIS — Z12.31 BREAST CANCER SCREENING BY MAMMOGRAM: Primary | ICD-10-CM

## 2024-07-05 PROCEDURE — 87186 SC STD MICRODIL/AGAR DIL: CPT | Performed by: NURSE PRACTITIONER

## 2024-07-05 PROCEDURE — 87070 CULTURE OTHR SPECIMN AEROBIC: CPT | Performed by: NURSE PRACTITIONER

## 2024-07-05 PROCEDURE — 87077 CULTURE AEROBIC IDENTIFY: CPT | Performed by: NURSE PRACTITIONER

## 2024-07-05 PROCEDURE — 87205 SMEAR GRAM STAIN: CPT | Performed by: NURSE PRACTITIONER

## 2024-07-09 ENCOUNTER — TELEPHONE (OUTPATIENT)
Dept: URGENT CARE | Facility: CLINIC | Age: 64
End: 2024-07-09
Payer: MEDICARE

## 2024-07-09 NOTE — TELEPHONE ENCOUNTER
----- Message from Ritchie Hall sent at 7/9/2024  9:01 AM EDT -----  Spoke with the patient.  Her test results showed type a staph infection that is probably resistant to the Keflex.  She tells me the area did not seem to be better.  According to culture Bactrim should work well.  We therefore change her antibiotic to that.  I told her if is not better by end of course to follow-up with her primary.  She voiced understanding

## 2024-07-22 RX ORDER — IBUPROFEN 800 MG/1
800 TABLET ORAL EVERY 12 HOURS PRN
Qty: 60 TABLET | Refills: 0 | Status: SHIPPED | OUTPATIENT
Start: 2024-07-22

## 2024-07-25 ENCOUNTER — HOSPITAL ENCOUNTER (OUTPATIENT)
Dept: BONE DENSITY | Facility: HOSPITAL | Age: 64
Discharge: HOME | End: 2024-07-25
Payer: MEDICARE

## 2024-07-25 ENCOUNTER — HOSPITAL ENCOUNTER (OUTPATIENT)
Dept: MAMMOGRAPHY | Facility: HOSPITAL | Age: 64
Discharge: HOME | End: 2024-07-25
Payer: MEDICARE

## 2024-07-25 DIAGNOSIS — Z12.31 BREAST CANCER SCREENING BY MAMMOGRAM: ICD-10-CM

## 2024-07-25 PROCEDURE — 77063 BREAST TOMOSYNTHESIS BI: CPT

## 2024-07-25 PROCEDURE — 77067 SCR MAMMO BI INCL CAD: CPT

## 2024-07-25 PROCEDURE — 77080 DXA BONE DENSITY AXIAL: CPT

## 2024-07-29 DIAGNOSIS — Z12.31 ENCOUNTER FOR SCREENING MAMMOGRAM FOR MALIGNANT NEOPLASM OF BREAST: Primary | ICD-10-CM

## 2024-08-21 RX ORDER — IBUPROFEN 800 MG/1
800 TABLET ORAL EVERY 12 HOURS PRN
Qty: 60 TABLET | Refills: 0 | Status: SHIPPED | OUTPATIENT
Start: 2024-08-21

## 2024-08-26 DIAGNOSIS — I10 ESSENTIAL HYPERTENSION: ICD-10-CM

## 2024-08-26 RX ORDER — LOSARTAN POTASSIUM 25 MG/1
25 TABLET ORAL DAILY
Qty: 90 TABLET | Refills: 3 | Status: SHIPPED | OUTPATIENT
Start: 2024-08-26

## 2024-08-26 RX ORDER — HYDROCHLOROTHIAZIDE 12.5 MG/1
12.5 TABLET ORAL DAILY
Qty: 90 TABLET | Refills: 3 | Status: SHIPPED | OUTPATIENT
Start: 2024-08-26

## 2024-08-28 ENCOUNTER — OFFICE VISIT (OUTPATIENT)
Dept: PODIATRY | Facility: CLINIC | Age: 64
End: 2024-08-28
Payer: MEDICARE

## 2024-08-28 VITALS
BODY MASS INDEX: 50.02 KG/M2 | HEART RATE: 94 BPM | HEIGHT: 64 IN | OXYGEN SATURATION: 93 % | TEMPERATURE: 98 F | SYSTOLIC BLOOD PRESSURE: 137 MMHG | DIASTOLIC BLOOD PRESSURE: 85 MMHG | WEIGHT: 293 LBS

## 2024-08-28 DIAGNOSIS — M79.671 FOOT PAIN, BILATERAL: ICD-10-CM

## 2024-08-28 DIAGNOSIS — R26.2 DIFFICULTY WALKING: Primary | ICD-10-CM

## 2024-08-28 DIAGNOSIS — B35.1 ONYCHOMYCOSIS: ICD-10-CM

## 2024-08-28 DIAGNOSIS — L60.0 ONYCHOCRYPTOSIS: ICD-10-CM

## 2024-08-28 DIAGNOSIS — M79.672 FOOT PAIN, BILATERAL: ICD-10-CM

## 2024-08-28 NOTE — PROGRESS NOTES
[facility] - PODIATRY    Today's Date: 08/28/24    Patient Name: Anita Estrada  MRN: [id number]  CSN: [id number]  PCP: Maris Thurman APRN, Last PCP Visit: 4/24/2024  Referring Provider: No ref. provider found    SUBJECTIVE     Chief Complaint   Patient presents with    Left Foot - Follow-up, Nail Problem    Right Foot - Follow-up, Nail Problem     HPI: Anita Estrada, a 63 y.o.female, comes to clinic.    New, Established, New Problem:  established   Location:  Toenails  Duration:   Greater than five years  Onset:  Gradual  Nature:  sore with palpation.  Stable, worsening, improving:   stable  Aggravating factors:  Pain with shoe gear and ambulation.  Previous Treatment:  debridement    Medical changes:  no changes    Patient denies any fevers, chills, nausea, vomiting, shortness of breath, nor any other constitutional signs nor symptoms.       I have reviewed/confirmed previously documented HPI with no changes.     Past Medical History:   Diagnosis Date    Acid reflux     Arthritis     Asthma     Carpal tunnel syndrome     Claustrophobia     Eczema     Essential hypertension 03/02/2015    Foot pain, bilateral     History of transfusion     1976,1983    Hypertension     Ingrowing nail 08/08/2018    Limb swelling     Lumbar back pain     Migraine headache     Obesity     Plantar fascial fibromatosis of right foot 10/11/2018    Pressure ulcer, stage 1     Sleep apnea with use of continuous positive airway pressure (CPAP)     Tinea unguium      Past Surgical History:   Procedure Laterality Date    BREAST LUMPECTOMY Right 2011    BREAST LUMPECTOMY      CARPAL TUNNEL RELEASE Right 03/06/2015, 9/15/17    DILATATION AND CURETTAGE  1976    ENDOSCOPY N/A 3/19/2024    Procedure: ESOPHAGOGASTRODUODENOSCOPY WITH BIOPSY;  Surgeon: Carmine Garcia Jr., MD;  Location: [facility] ENDOSCOPY;  Service: General;  Laterality: N/A;  PRE: DYSPEPSIA /  POST: GASTRITIS    HYSTERECTOMY  1983    SHOULDER ARTHROSCOPY  Left      Family History   Problem Relation Age of Onset    Arthritis Mother         Family history of certain chronic disabling diseases    Osteoporosis Mother     Cancer Father         unspecified    Stroke Other         not specified    Diabetes Other         unspecified type    Malig Hyperthermia Neg Hx      Social History     Socioeconomic History    Marital status:    Tobacco Use    Smoking status: Former     Current packs/day: 0.50     Average packs/day: 0.5 packs/day for 37.7 years (18.8 ttl pk-yrs)     Types: Cigarettes     Start date: 1974     Quit date: 2008    Smokeless tobacco: Never   Vaping Use    Vaping status: Former    Substances: Nicotine, Flavoring    Devices: Disposable   Substance and Sexual Activity    Alcohol use: Yes     Comment: social    Drug use: Never    Sexual activity: Yes     Partners: Male     Birth control/protection: Hysterectomy     No Known Allergies  Current Outpatient Medications   Medication Sig Dispense Refill    acetaminophen (TYLENOL) 650 MG 8 hr tablet Take 1 tablet by mouth Every 8 (Eight) Hours As Needed for Mild Pain.      Diclofenac Sodium (VOLTAREN) 1 % gel gel APPLY TOPICALLY FOUR TIMES A DAY AS DIRECTED 100 g 3    fluticasone (Flonase) 50 MCG/ACT nasal spray 1 spray into the nostril(s) as directed by provider Daily. 18.2 mL 10    gabapentin (NEURONTIN) 800 MG tablet Take 1 tablet by mouth 4 (Four) Times a Day. 120 tablet 2    hydroCHLOROthiazide 12.5 MG tablet Take 1 tablet by mouth Daily. 90 tablet 3    ibuprofen (ADVIL,MOTRIN) 800 MG tablet TAKE 1 TABLET BY MOUTH EVERY 12 HOURS AS NEEDED FOR MILD PAIN 60 tablet 0    Ibuprofen 3 %, Gabapentin 10 %, Baclofen 2 %, lidocaine 4 % Apply 1-2 g topically to the appropriate area as directed 3 (Three) to 4 (Four) times daily. 90 g 2    losartan (Cozaar) 25 MG tablet Take 1 tablet by mouth Daily. 90 tablet 3    vitamin D (ERGOCALCIFEROL) 1.25 MG (01737 UT) capsule capsule Take 1 capsule by mouth 1 (One) Time Per Week.  15 capsule 1     No current facility-administered medications for this visit.     Review of Systems   Constitutional: Negative.    Skin:         Painful toenails.   All other systems reviewed and are negative.      OBJECTIVE     Vitals:    24 1418   BP: 137/85   Pulse: 94   Temp: 98 °F (36.7 °C)   SpO2: 93%       Patient seen in no apparent distress.      PHYSICAL EXAM:     Foot/Ankle Exam    GENERAL  Appearance:  obese  Orientation:  AAOx3  Affect:  appropriate  Gait:  antalgic  Assistance:  cane use  Right shoe gear: casual shoe  Left shoe gear: casual shoe    VASCULAR     Right Foot Vascularity   Normal vascular exam    Dorsalis pedis:  2+  Posterior tibial:  2+  Skin temperature:  warm  Edema grading:  None  CFT:  < 3 seconds  Pedal hair growth:  Present  Varicosities:  none     Left Foot Vascularity   Normal vascular exam    Dorsalis pedis:  2+  Posterior tibial:  2+  Skin temperature:  warm  Edema gradin+ and non-pitting  CFT:  < 3 seconds  Pedal hair growth:  Present  Varicosities:  none     NEUROLOGIC     Right Foot Neurologic   Normal sensation    Light touch sensation: normal  Vibratory sensation: normal  Hot/Cold sensation: normal  Protective Sensation using [place] Monofilament:   Sites intact: 10  Sites tested: 10     Left Foot Neurologic   Normal sensation    Light touch sensation: normal  Vibratory sensation: normal  Hot/Cold sensation:  normal  Protective Sensation using [place] Monofilament:   Sites intact: 10  Sites tested: 10    MUSCLE STRENGTH     Right Foot Muscle Strength   Foot dorsiflexion:  4  Foot plantar flexion:  4  Foot inversion:  4  Foot eversion:  4     Left Foot Muscle Strength   Foot dorsiflexion:  4  Foot plantar flexion:  4  Foot inversion:  4  Foot eversion:  4    RANGE OF MOTION     Right Foot Range of Motion   Foot and ankle ROM within normal limits       Left Foot Range of Motion   Foot and ankle ROM within normal limits      DERMATOLOGIC       Right Foot Dermatologic   Skin  Right foot skin is intact.   Nails  1.  Positive for elongated, onychomycosis, abnormal thickness, subungual debris and ingrown toenail.  2.  Positive for elongated, onychomycosis, abnormal thickness, subungual debris and ingrown toenail.  3.  Positive for elongated, onychomycosis, abnormal thickness, subungual debris and ingrown toenail.  4.  Positive for elongated, onychomycosis, abnormal thickness, subungual debris and ingrown toenail.     Left Foot Dermatologic   Skin  Left foot skin is intact.   Nails  1.  Positive for elongated, onychomycosis, abnormal thickness, subungual debris and ingrown toenail.  2.  Positive for elongated, onychomycosis, abnormal thickness, subungual debris and ingrown toenail.  3.  Positive for elongated, onychomycosis, abnormal thickness, subungual debris and ingrown toenail.  4.  Positive for elongated, onychomycosis, abnormally thick, subungual debris and ingrown toenail.  5.  Positive for elongated, onychomycosis, abnormally thick, subungual debris and ingrown toenail.    I have reexamined the patient the results are consistent with the previously documented exam.    ASSESSMENT/PLAN     Diagnoses and all orders for this visit:    1. Difficulty walking (Primary)    2. Onychomycosis    3. Foot pain, bilateral    4. Onychocryptosis      Comprehensive lower extremity examination and evaluation was performed.    Discussed findings and treatment plan including risks, benefits, and treatment options with patient in detail. Patient agreed with treatment plan.    Toenails 1 through 5 on left and toenails 1 through 4 on right were debrided in thickness and length and then smoothed with a Dremel Tool.  Tolerated the procedure well without complications.    An After Visit Summary was printed and given to the patient at discharge, including (if requested) any available informative/educational handouts regarding diagnosis, treatment, or medications. All questions  were answered to patient/family satisfaction. Should symptoms fail to improve or worsen they agree to call or return to clinic or to go to the Emergency Department. Discussed the importance of following up with any needed screening tests/labs/specialist appointments and any requested follow-up recommended by me today. Importance of maintaining follow-up discussed and patient accepts that missed appointments can delay diagnosis and potentially lead to worsening of conditions.    Return in about 9 weeks (around 10/30/2024) for Toenail Care., or sooner if acute issues arise.    I have reviewed the assessment and plan and verified the accuracy of it. No changes to assessment and plan since the information was documented. Ranulfo Huizar DPM 08/28/24     I have dictated this note utilizing Dragon Dictation.  Please note that portions of this note were completed with a voice recognition program.  Part of this note may be an electronic transcription/translation of spoken language to printed text using the Dragon Dictation System.      This document has been electronically signed by Ranulfo Huizar DPM on August 28, 2024 14:34 EDT

## 2024-09-18 RX ORDER — IBUPROFEN 800 MG/1
800 TABLET, FILM COATED ORAL EVERY 12 HOURS PRN
Qty: 60 TABLET | Refills: 0 | Status: SHIPPED | OUTPATIENT
Start: 2024-09-18

## 2024-09-24 ENCOUNTER — TELEPHONE (OUTPATIENT)
Dept: ORTHOPEDIC SURGERY | Facility: CLINIC | Age: 64
End: 2024-09-24
Payer: MEDICARE

## 2024-10-07 ENCOUNTER — TELEPHONE (OUTPATIENT)
Dept: ORTHOPEDIC SURGERY | Facility: CLINIC | Age: 64
End: 2024-10-07
Payer: MEDICARE

## 2024-10-07 NOTE — TELEPHONE ENCOUNTER
Patient called to check on approval for her euflexxa injections.   Need auth still, no approval in chart  Thanks

## 2024-10-15 RX ORDER — IBUPROFEN 800 MG/1
800 TABLET, FILM COATED ORAL EVERY 12 HOURS PRN
Qty: 60 TABLET | Refills: 0 | OUTPATIENT
Start: 2024-10-15

## 2024-10-29 ENCOUNTER — TELEPHONE (OUTPATIENT)
Dept: ORTHOPEDIC SURGERY | Facility: CLINIC | Age: 64
End: 2024-10-29
Payer: MEDICARE

## 2024-10-29 NOTE — TELEPHONE ENCOUNTER
PATIENT CALLED CHECKING STATUS OF GEL PA. PATIENT ADVISED PER TE ON 10/07/24 PA WAS SUBMITTED AND PENDING INSURANCE APPROVAL. PATIENT REQUESTING A RETURN CALL FOR STATUS UPDATE.

## 2024-11-08 ENCOUNTER — TELEPHONE (OUTPATIENT)
Dept: ORTHOPEDIC SURGERY | Facility: CLINIC | Age: 64
End: 2024-11-08
Payer: MEDICARE

## 2024-11-08 NOTE — TELEPHONE ENCOUNTER
APPT:  LT KNEE EUFLEXXA INJ WITH [facility]    11-12 AT 3:45 PM  11-19 AT 3:45 PM  11-26 AT 3:45 PM    LAST LT KNEE EUFLEXXA INJ #3 = 5-7    [facility] =[facility] EXP: 5-10-25

## 2024-11-08 NOTE — TELEPHONE ENCOUNTER
----- Message from Leslie MEYER sent at 11/7/2024  8:22 AM EST -----  Patient has been approved for left knee Euflexxa for dates:  11/11/24 to 05/10/25.  Okay to schedule when appropriate.  Auth #:  [id number]

## 2024-11-12 ENCOUNTER — OFFICE VISIT (OUTPATIENT)
Dept: ORTHOPEDIC SURGERY | Facility: CLINIC | Age: 64
End: 2024-11-12
Payer: MEDICARE

## 2024-11-12 VITALS
HEART RATE: 76 BPM | HEIGHT: 64 IN | WEIGHT: 293 LBS | SYSTOLIC BLOOD PRESSURE: 160 MMHG | DIASTOLIC BLOOD PRESSURE: 82 MMHG | BODY MASS INDEX: 50.02 KG/M2 | OXYGEN SATURATION: 95 %

## 2024-11-12 DIAGNOSIS — M17.12 PRIMARY OSTEOARTHRITIS OF LEFT KNEE: Primary | ICD-10-CM

## 2024-11-12 RX ADMIN — Medication 20 MG: at 15:50

## 2024-11-12 NOTE — PATIENT INSTRUCTIONS
Patient received first Euflexxa injection today in office into the left knee and tolerated the procedure well without complication.  Counseled that these injections can be given every 6 months and 1 day with insurance approval. Pt can also receive steroid injections intermittently between these doses.  Steroid injections can be given every 3 months.    Discussed the risk, benefits and alternatives of injection.  Discussed potential complications such as increased pain, swelling and tenderness at the injection site.  Possibility of reaction.  Possibility that injection may not improve pain. Risk of infection.  Possibility of worsening pain after injection.  Patient verbalized understanding and gives verbal consent to proceed.     Follow up in 1 week for second Euflexxa injection. Call with questions, concerns, or worsening symptoms.

## 2024-11-12 NOTE — PROGRESS NOTES
"Chief Complaint  Follow-up of the Left Knee    Subjective      Anita Estrada presents to [facility] for follow-up of left knee osteoarthritis she is managing conservatively with intermittent injections.  She is here today to receive her first Euflexxa injection.  She received the series last on 5/7/2024 and reports that it worked very well for her.    Objective   No Known Allergies    Vital Signs:   /82   Pulse 76   Ht 162.6 cm (64.02\")   Wt (!) 142 kg (313 lb)   SpO2 95%   BMI 53.70 kg/m²       Physical Exam    Constitutional: Awake, alert. Well nourished appearance.    Integumentary: Warm, dry, intact. No obvious rashes.    HENT: Atraumatic, normocephalic.   Respiratory: Non labored respirations .   Cardiovascular: Intact peripheral pulses.    Psychiatric: Normal mood and affect. A&O X3    Ortho Exam  Left knee: Range of motion 0 to 120 degrees.  Mild effusion noted.  Stable to varus and valgus stress.  Stable to anterior and posterior drawer test.  Tender to palpation the medial joint line.  Neurovascular intact.  Sensation is intact.    Imaging Results (Most Recent)       None             Large Joint Arthrocentesis: L knee  Date/Time: 11/12/2024 3:50 PM  Consent given by: patient  Site marked: site marked  Timeout: Immediately prior to procedure a time out was called to verify the correct patient, procedure, equipment, support staff and site/side marked as required   Supporting Documentation  Indications: pain   Procedure Details  Location: knee - L knee  Needle gauge: 21 G.  Medications administered: 20 mg Sodium Hyaluronate (Viscosup) 20 MG/2ML  Patient tolerance: patient tolerated the procedure well with no immediate complications         This injection documentation was Scribed for CHYNA Richard by Argelia Reddy.  11/12/24   15:51 EST       Assessment and Plan   Problem List Items Addressed This Visit          Musculoskeletal and Injuries    Primary " osteoarthritis of left knee - Primary    Relevant Orders    Large Joint Arthrocentesis: L knee       Follow Up   Return in about 1 week (around 11/19/2024).    Patient is a non-smoker, did not discuss options for smoking cessation.     Social History     Socioeconomic History    Marital status:    Tobacco Use    Smoking status: Former     Current packs/day: 0.50     Average packs/day: 0.5 packs/day for 37.9 years (18.9 ttl pk-yrs)     Types: Cigarettes     Start date: 1974     Quit date: 2008    Smokeless tobacco: Never   Vaping Use    Vaping status: Former    Substances: Nicotine, Flavoring    Devices: Disposable   Substance and Sexual Activity    Alcohol use: Yes     Comment: social    Drug use: Never    Sexual activity: Yes     Partners: Male     Birth control/protection: Hysterectomy       Patient Instructions   Patient received first Euflexxa injection today in office into the left knee and tolerated the procedure well without complication.  Counseled that these injections can be given every 6 months and 1 day with insurance approval. Pt can also receive steroid injections intermittently between these doses.  Steroid injections can be given every 3 months.    Discussed the risk, benefits and alternatives of injection.  Discussed potential complications such as increased pain, swelling and tenderness at the injection site.  Possibility of reaction.  Possibility that injection may not improve pain. Risk of infection.  Possibility of worsening pain after injection.  Patient verbalized understanding and gives verbal consent to proceed.     Follow up in 1 week for second Euflexxa injection. Call with questions, concerns, or worsening symptoms.    Patient was given instructions and counseling regarding her condition or for health maintenance advice. Please see specific information pulled into the AVS if appropriate.

## 2024-11-13 RX ORDER — HYALURONATE SODIUM 10 MG/ML
20 SYRINGE (ML) INTRAARTICULAR
Status: COMPLETED | OUTPATIENT
Start: 2024-11-12 | End: 2024-11-12

## 2024-11-18 ENCOUNTER — OFFICE VISIT (OUTPATIENT)
Dept: PODIATRY | Facility: CLINIC | Age: 64
End: 2024-11-18
Payer: MEDICARE

## 2024-11-18 VITALS
SYSTOLIC BLOOD PRESSURE: 117 MMHG | BODY MASS INDEX: 50.02 KG/M2 | HEART RATE: 76 BPM | OXYGEN SATURATION: 94 % | WEIGHT: 293 LBS | HEIGHT: 64 IN | DIASTOLIC BLOOD PRESSURE: 81 MMHG

## 2024-11-18 DIAGNOSIS — M79.671 FOOT PAIN, BILATERAL: ICD-10-CM

## 2024-11-18 DIAGNOSIS — R26.2 DIFFICULTY WALKING: Primary | ICD-10-CM

## 2024-11-18 DIAGNOSIS — M79.672 FOOT PAIN, BILATERAL: ICD-10-CM

## 2024-11-18 DIAGNOSIS — L60.0 ONYCHOCRYPTOSIS: ICD-10-CM

## 2024-11-18 DIAGNOSIS — B35.1 ONYCHOMYCOSIS: ICD-10-CM

## 2024-11-18 PROCEDURE — 3074F SYST BP LT 130 MM HG: CPT | Performed by: PODIATRIST

## 2024-11-18 PROCEDURE — 1160F RVW MEDS BY RX/DR IN RCRD: CPT | Performed by: PODIATRIST

## 2024-11-18 PROCEDURE — 1159F MED LIST DOCD IN RCRD: CPT | Performed by: PODIATRIST

## 2024-11-18 PROCEDURE — 3079F DIAST BP 80-89 MM HG: CPT | Performed by: PODIATRIST

## 2024-11-18 PROCEDURE — 11721 DEBRIDE NAIL 6 OR MORE: CPT | Performed by: PODIATRIST

## 2024-11-18 NOTE — PROGRESS NOTES
[facility] - PODIATRY    Today's Date: 11/18/24    Patient Name: Anita Estrada  MRN: [id number]  CSN: [id number]  PCP: Maris Thurman APRN, Last PCP Visit: 4/24/2024  Referring Provider: No ref. provider found    SUBJECTIVE     Chief Complaint   Patient presents with    Left Foot - Follow-up, Nail Problem    Right Foot - Follow-up, Nail Problem     HPI: Anita Estrada, a 63 y.o.female, comes to clinic.    New, Established, New Problem:  established   Location:  Toenails  Duration:   Greater than five years  Onset:  Gradual  Nature:  sore with palpation.  Stable, worsening, improving:   stable  Aggravating factors:  Pain with shoe gear and ambulation.  Previous Treatment:  debridement    Medical changes:  none    Patient denies any fevers, chills, nausea, vomiting, shortness of breath, nor any other constitutional signs nor symptoms.       I have reviewed/confirmed previously documented HPI with no changes.     Past Medical History:   Diagnosis Date    Acid reflux     Arthritis     Asthma     Carpal tunnel syndrome     Claustrophobia     Eczema     Essential hypertension 03/02/2015    Foot pain, bilateral     History of transfusion     1976,1983    Hypertension     Ingrowing nail 08/08/2018    Limb swelling     Lumbar back pain     Migraine headache     Obesity     Plantar fascial fibromatosis of right foot 10/11/2018    Pressure ulcer, stage 1     Sleep apnea with use of continuous positive airway pressure (CPAP)     Tinea unguium      Past Surgical History:   Procedure Laterality Date    BREAST LUMPECTOMY Right 2011    BREAST LUMPECTOMY      CARPAL TUNNEL RELEASE Right 03/06/2015, 9/15/17    DILATATION AND CURETTAGE  1976    ENDOSCOPY N/A 3/19/2024    Procedure: ESOPHAGOGASTRODUODENOSCOPY WITH BIOPSY;  Surgeon: Carmine Garcia Jr., MD;  Location: [facility] END[facility];  Service: General;  Laterality: N/A;  PRE: DYSPEPSIA /  POST: GASTRITIS    HYSTERECTOMY  1983    SHOULDER ARTHROSCOPY Left       Family History   Problem Relation Age of Onset    Arthritis Mother         Family history of certain chronic disabling diseases    Osteoporosis Mother     Cancer Father         unspecified    Stroke Other         not specified    Diabetes Other         unspecified type    Malig Hyperthermia Neg Hx      Social History     Socioeconomic History    Marital status:    Tobacco Use    Smoking status: Former     Current packs/day: 0.50     Average packs/day: 0.5 packs/day for 37.9 years (18.9 ttl pk-yrs)     Types: Cigarettes     Start date: 1974     Quit date: 2008    Smokeless tobacco: Never   Vaping Use    Vaping status: Former    Substances: Nicotine, Flavoring    Devices: Disposable   Substance and Sexual Activity    Alcohol use: Yes     Comment: social    Drug use: Never    Sexual activity: Yes     Partners: Male     Birth control/protection: Hysterectomy     No Known Allergies  Current Outpatient Medications   Medication Sig Dispense Refill    acetaminophen (TYLENOL) 650 MG 8 hr tablet Take 1 tablet by mouth Every 8 (Eight) Hours As Needed for Mild Pain.      Diclofenac Sodium (VOLTAREN) 1 % gel gel APPLY TOPICALLY FOUR TIMES A DAY AS DIRECTED 100 g 3    fluticasone (Flonase) 50 MCG/ACT nasal spray 1 spray into the nostril(s) as directed by provider Daily. 18.2 mL 10    gabapentin (NEURONTIN) 800 MG tablet Take 1 tablet by mouth 4 (Four) Times a Day. 120 tablet 2    hydroCHLOROthiazide 12.5 MG tablet Take 1 tablet by mouth Daily. 90 tablet 3    ibuprofen (ADVIL,MOTRIN) 800 MG tablet TAKE 1 TABLET BY MOUTH EVERY 12 HOURS AS NEEDED FOR MILD PAIN 60 tablet 0    Ibuprofen 3 %, Gabapentin 10 %, Baclofen 2 %, lidocaine 4 % Apply 1-2 g topically to the appropriate area as directed 3 (Three) to 4 (Four) times daily. 90 g 2    losartan (Cozaar) 25 MG tablet Take 1 tablet by mouth Daily. 90 tablet 3    vitamin D (ERGOCALCIFEROL) 1.25 MG (16228 UT) capsule capsule Take 1 capsule by mouth 1 (One) Time Per Week. 15  capsule 1     No current facility-administered medications for this visit.     Review of Systems   Constitutional: Negative.    Skin:         Painful toenails.   All other systems reviewed and are negative.      OBJECTIVE     Vitals:    24 1022   BP: 117/81   Pulse: 76   SpO2: 94%       Patient seen in no apparent distress.      PHYSICAL EXAM:     Foot/Ankle Exam    GENERAL  Appearance:  obese  Orientation:  AAOx3  Affect:  appropriate  Gait:  antalgic  Assistance:  cane use  Right shoe gear: casual shoe  Left shoe gear: casual shoe    VASCULAR     Right Foot Vascularity   Normal vascular exam    Dorsalis pedis:  2+  Posterior tibial:  2+  Skin temperature:  warm  Edema grading:  None  CFT:  < 3 seconds  Pedal hair growth:  Present  Varicosities:  none     Left Foot Vascularity   Normal vascular exam    Dorsalis pedis:  2+  Posterior tibial:  2+  Skin temperature:  warm  Edema gradin+ and non-pitting  CFT:  < 3 seconds  Pedal hair growth:  Present  Varicosities:  none     NEUROLOGIC     Right Foot Neurologic   Normal sensation    Light touch sensation: normal  Vibratory sensation: normal  Hot/Cold sensation: normal  Protective Sensation using Edison-Bryanna Monofilament:   Sites intact: 10  Sites tested: 10     Left Foot Neurologic   Normal sensation    Light touch sensation: normal  Vibratory sensation: normal  Hot/Cold sensation:  normal  Protective Sensation using Edison-Bryanna Monofilament:   Sites intact: 10  Sites tested: 10    MUSCLE STRENGTH     Right Foot Muscle Strength   Foot dorsiflexion:  4  Foot plantar flexion:  4  Foot inversion:  4  Foot eversion:  4     Left Foot Muscle Strength   Foot dorsiflexion:  4  Foot plantar flexion:  4  Foot inversion:  4  Foot eversion:  4    RANGE OF MOTION     Right Foot Range of Motion   Foot and ankle ROM within normal limits       Left Foot Range of Motion   Foot and ankle ROM within normal limits      DERMATOLOGIC      Right Foot Dermatologic    Skin  Right foot skin is intact.   Nails  1.  Positive for elongated, onychomycosis, abnormal thickness, subungual debris and ingrown toenail.  2.  Positive for elongated, onychomycosis, abnormal thickness, subungual debris and ingrown toenail.  3.  Positive for elongated, onychomycosis, abnormal thickness, subungual debris and ingrown toenail.  4.  Positive for elongated, onychomycosis, abnormal thickness, subungual debris and ingrown toenail.     Left Foot Dermatologic   Skin  Left foot skin is intact.   Nails  1.  Positive for elongated, onychomycosis, abnormal thickness, subungual debris and ingrown toenail.  2.  Positive for elongated, onychomycosis, abnormal thickness, subungual debris and ingrown toenail.  3.  Positive for elongated, onychomycosis, abnormal thickness, subungual debris and ingrown toenail.  4.  Positive for elongated, onychomycosis, abnormally thick, subungual debris and ingrown toenail.  5.  Positive for elongated, onychomycosis, abnormally thick, subungual debris and ingrown toenail.    I have reexamined the patient the results are consistent with the previously documented exam.    ASSESSMENT/PLAN     Diagnoses and all orders for this visit:    1. Difficulty walking (Primary)    2. Onychomycosis    3. Foot pain, bilateral    4. Onychocryptosis      Comprehensive lower extremity examination and evaluation was performed.    Discussed findings and treatment plan including risks, benefits, and treatment options with patient in detail. Patient agreed with treatment plan.    Toenails 1 through 5 on left and toenails 1 through 4 on right were debrided in thickness and length and then smoothed with a Dremel Tool.  Tolerated the procedure well without complications.    An After Visit Summary was printed and given to the patient at discharge, including (if requested) any available informative/educational handouts regarding diagnosis, treatment, or medications. All questions were answered to  patient/family satisfaction. Should symptoms fail to improve or worsen they agree to call or return to clinic or to go to the Emergency Department. Discussed the importance of following up with any needed screening tests/labs/specialist appointments and any requested follow-up recommended by me today. Importance of maintaining follow-up discussed and patient accepts that missed appointments can delay diagnosis and potentially lead to worsening of conditions.    Return in about 9 weeks (around 1/20/2025) for Toenail Care., or sooner if acute issues arise.    I have reviewed the assessment and plan and verified the accuracy of it. No changes to assessment and plan since the information was documented. Ranulfo Huizar DPM 11/18/24     I have dictated this note utilizing Dragon Dictation.  Please note that portions of this note were completed with a voice recognition program.  Part of this note may be an electronic transcription/translation of spoken language to printed text using the Dragon Dictation System.      This document has been electronically signed by Ranulfo Huizar DPM on November 18, 2024 10:36 EST

## 2024-11-19 ENCOUNTER — OFFICE VISIT (OUTPATIENT)
Dept: ORTHOPEDIC SURGERY | Facility: CLINIC | Age: 64
End: 2024-11-19
Payer: MEDICARE

## 2024-11-19 VITALS
BODY MASS INDEX: 50.02 KG/M2 | OXYGEN SATURATION: 95 % | HEART RATE: 66 BPM | HEIGHT: 64 IN | SYSTOLIC BLOOD PRESSURE: 145 MMHG | WEIGHT: 293 LBS | DIASTOLIC BLOOD PRESSURE: 76 MMHG

## 2024-11-19 DIAGNOSIS — M17.12 PRIMARY OSTEOARTHRITIS OF LEFT KNEE: Primary | ICD-10-CM

## 2024-11-19 PROCEDURE — 1159F MED LIST DOCD IN RCRD: CPT

## 2024-11-19 PROCEDURE — 3078F DIAST BP <80 MM HG: CPT

## 2024-11-19 PROCEDURE — 1160F RVW MEDS BY RX/DR IN RCRD: CPT

## 2024-11-19 PROCEDURE — 20610 DRAIN/INJ JOINT/BURSA W/O US: CPT

## 2024-11-19 PROCEDURE — 3077F SYST BP >= 140 MM HG: CPT

## 2024-11-19 RX ORDER — HYALURONATE SODIUM 10 MG/ML
20 SYRINGE (ML) INTRAARTICULAR
Status: COMPLETED | OUTPATIENT
Start: 2024-11-19 | End: 2024-11-19

## 2024-11-19 RX ADMIN — Medication 20 MG: at 15:04

## 2024-11-19 NOTE — PROGRESS NOTES
"Chief Complaint  Follow-up of the Left Knee    Subjective      Anita Estrada presents to [facility] for follow-up of left knee osteoarthritis she is managing conservatively with intermittent injections.  She pivoted to receive her second Euflexxa injection.  She received a series in the past on 5/7/2024 and it worked very well for her.  She does notice improvement after the first injection.    Objective   No Known Allergies    Vital Signs:   /76   Pulse 66   Ht 162.6 cm (64.02\")   Wt (!) 140 kg (309 lb)   SpO2 95%   BMI 53.01 kg/m²       Physical Exam    Constitutional: Awake, alert. Well nourished appearance.    Integumentary: Warm, dry, intact. No obvious rashes.    HENT: Atraumatic, normocephalic.   Respiratory: Non labored respirations .   Cardiovascular: Intact peripheral pulses.    Psychiatric: Normal mood and affect. A&O X3    Ortho Exam  Left knee: Range of motion 0 to 115 degrees.  Mild effusion noted.  Stable to varus and valgus stress.  Stable to anterior posterior drawer test.  Tender to palpation the medial joint line.  Neurovasc intact.  Sensation is intact.    Imaging Results (Most Recent)       None             Large Joint: L knee  Date/Time: 11/19/2024 3:04 PM  Consent given by: patient  Site marked: site marked  Timeout: Immediately prior to procedure a time out was called to verify the correct patient, procedure, equipment, support staff and site/side marked as required   Supporting Documentation  Indications: pain   Procedure Details  Location: knee - L knee  Preparation: Patient was prepped and draped in the usual sterile fashion  Needle gauge: 21 G.  Medications administered: 20 mg Sodium Hyaluronate (Viscosup) 20 MG/2ML  Patient tolerance: patient tolerated the procedure well with no immediate complications         This injection documentation was Scribed for CHYNA Richard by Dorie Steward MA.  11/19/24   15:06 EST      Assessment and Plan "   Problem List Items Addressed This Visit          Musculoskeletal and Injuries    Primary osteoarthritis of left knee - Primary    Relevant Orders    Large Joint: L knee       Follow Up   Return in about 1 week (around 11/26/2024).    Patient is a non-smoker, did not discuss options for smoking cessation.     Social History     Socioeconomic History    Marital status:    Tobacco Use    Smoking status: Former     Current packs/day: 0.50     Average packs/day: 0.5 packs/day for 37.9 years (18.9 ttl pk-yrs)     Types: Cigarettes     Start date: 1974     Quit date: 2008    Smokeless tobacco: Never   Vaping Use    Vaping status: Former    Substances: Nicotine, Flavoring    Devices: Disposable   Substance and Sexual Activity    Alcohol use: Yes     Comment: social    Drug use: Never    Sexual activity: Yes     Partners: Male     Birth control/protection: Hysterectomy       Patient Instructions   Patient received second Euflexxa injection today in office into the left knee and tolerated the procedure well without complication.  Counseled that these injections can be given every 6 months and 1 day with insurance approval. Pt can also receive steroid injections intermittently between these doses.  Steroid injections can be given every 3 months.    Discussed the risk, benefits and alternatives of injection.  Discussed potential complications such as increased pain, swelling and tenderness at the injection site.  Possibility of reaction.  Possibility that injection may not improve pain. Risk of infection.  Possibility of worsening pain after injection.  Patient verbalized understanding and gives verbal consent to proceed.     Follow up in 1 week for third Euflexxa injection. Call with questions, concerns, or worsening symptoms.    Patient was given instructions and counseling regarding her condition or for health maintenance advice. Please see specific information pulled into the AVS if appropriate.

## 2024-11-19 NOTE — PATIENT INSTRUCTIONS
Patient received second Euflexxa injection today in office into the left knee and tolerated the procedure well without complication.  Counseled that these injections can be given every 6 months and 1 day with insurance approval. Pt can also receive steroid injections intermittently between these doses.  Steroid injections can be given every 3 months.    Discussed the risk, benefits and alternatives of injection.  Discussed potential complications such as increased pain, swelling and tenderness at the injection site.  Possibility of reaction.  Possibility that injection may not improve pain. Risk of infection.  Possibility of worsening pain after injection.  Patient verbalized understanding and gives verbal consent to proceed.     Follow up in 1 week for third Euflexxa injection. Call with questions, concerns, or worsening symptoms.

## 2024-11-26 ENCOUNTER — OFFICE VISIT (OUTPATIENT)
Dept: ORTHOPEDIC SURGERY | Facility: CLINIC | Age: 64
End: 2024-11-26
Payer: MEDICARE

## 2024-11-26 VITALS
HEART RATE: 88 BPM | WEIGHT: 293 LBS | HEIGHT: 64 IN | OXYGEN SATURATION: 96 % | BODY MASS INDEX: 50.02 KG/M2 | DIASTOLIC BLOOD PRESSURE: 89 MMHG | SYSTOLIC BLOOD PRESSURE: 159 MMHG

## 2024-11-26 DIAGNOSIS — M17.12 PRIMARY OSTEOARTHRITIS OF LEFT KNEE: Primary | ICD-10-CM

## 2024-11-26 RX ORDER — HYALURONATE SODIUM 10 MG/ML
20 SYRINGE (ML) INTRAARTICULAR
Status: COMPLETED | OUTPATIENT
Start: 2024-11-26 | End: 2024-11-26

## 2024-11-26 RX ADMIN — Medication 20 MG: at 14:40

## 2024-11-26 NOTE — PROGRESS NOTES
"Chief Complaint  Follow-up of the Left Knee    Subjective      Anita Estrada presents to [facility] for follow-up of left knee osteoarthritis she is managing conservatively with intermittent injections. She pivoted to receive her third Euflexxa injection. She received a series in the past on 5/7/2024 and it worked very well for her. She does notice improvement after the first 2 injections.     Objective   No Known Allergies    Vital Signs:   /89   Pulse 88   Ht 162.6 cm (64.02\")   Wt (!) 140 kg (309 lb)   SpO2 96%   BMI 53.01 kg/m²       Physical Exam    Constitutional: Awake, alert. Well nourished appearance.    Integumentary: Warm, dry, intact. No obvious rashes.    HENT: Atraumatic, normocephalic.   Respiratory: Non labored respirations .   Cardiovascular: Intact peripheral pulses.    Psychiatric: Normal mood and affect. A&O X3    Ortho Exam  Left knee: Range of motion 0 to 115 degrees. Mild effusion noted. Stable to varus and valgus stress. Stable to anterior posterior drawer test. Tender to palpation the medial joint line. Neurovasc intact. Sensation is intact.     Imaging Results (Most Recent)       None             Large Joint: L knee  Date/Time: 11/26/2024 2:40 PM  Consent given by: patient  Site marked: site marked  Timeout: Immediately prior to procedure a time out was called to verify the correct patient, procedure, equipment, support staff and site/side marked as required   Supporting Documentation  Indications: pain   Procedure Details  Location: knee - L knee  Preparation: Patient was prepped and draped in the usual sterile fashion  Needle gauge: 21 G.  Medications administered: 20 mg Sodium Hyaluronate (Viscosup) 20 MG/2ML  Patient tolerance: patient tolerated the procedure well with no immediate complications       This injection documentation was Scribed for CHYNA Richard by Dorie Steward MA.  11/26/24   14:41 EST        Assessment and Plan "   Problem List Items Addressed This Visit          Musculoskeletal and Injuries    Primary osteoarthritis of left knee - Primary    Relevant Orders    Large Joint: L knee       Follow Up   Return in about 6 weeks (around 1/7/2025).    Patient is a non-smoker, did not discuss options for smoking cessation.     Social History     Socioeconomic History    Marital status:    Tobacco Use    Smoking status: Former     Current packs/day: 0.50     Average packs/day: 0.5 packs/day for 37.9 years (19.0 ttl pk-yrs)     Types: Cigarettes     Start date: 1974     Quit date: 2008    Smokeless tobacco: Never   Vaping Use    Vaping status: Former    Substances: Nicotine, Flavoring    Devices: Disposable   Substance and Sexual Activity    Alcohol use: Yes     Comment: social    Drug use: Never    Sexual activity: Yes     Partners: Male     Birth control/protection: Hysterectomy       Patient Instructions   Patient received third Euflexxa injection today in office into the left knee and tolerated the procedure well without complication.  Counseled that these injections can be given every 6 months and 1 day with insurance approval. Pt can also receive steroid injections intermittently between these doses.  Steroid injections can be given every 3 months.    Discussed the risk, benefits and alternatives of injection.  Discussed potential complications such as increased pain, swelling and tenderness at the injection site.  Possibility of reaction.  Possibility that injection may not improve pain. Risk of infection.  Possibility of worsening pain after injection.  Patient verbalized understanding and gives verbal consent to proceed.     Follow up in 6 weeks to evaluate effectiveness of injection. Call with questions, concerns, or worsening symptoms.    Patient was given instructions and counseling regarding her condition or for health maintenance advice. Please see specific information pulled into the AVS if appropriate.

## 2024-11-26 NOTE — PATIENT INSTRUCTIONS
Patient received third Euflexxa injection today in office into the left knee and tolerated the procedure well without complication.  Counseled that these injections can be given every 6 months and 1 day with insurance approval. Pt can also receive steroid injections intermittently between these doses.  Steroid injections can be given every 3 months.    Discussed the risk, benefits and alternatives of injection.  Discussed potential complications such as increased pain, swelling and tenderness at the injection site.  Possibility of reaction.  Possibility that injection may not improve pain. Risk of infection.  Possibility of worsening pain after injection.  Patient verbalized understanding and gives verbal consent to proceed.     Follow up in 6 weeks to evaluate effectiveness of injection. Call with questions, concerns, or worsening symptoms.

## 2024-12-02 ENCOUNTER — OFFICE VISIT (OUTPATIENT)
Dept: SLEEP MEDICINE | Facility: HOSPITAL | Age: 64
End: 2024-12-02
Payer: MEDICARE

## 2024-12-02 VITALS
HEART RATE: 69 BPM | WEIGHT: 293 LBS | SYSTOLIC BLOOD PRESSURE: 152 MMHG | OXYGEN SATURATION: 100 % | DIASTOLIC BLOOD PRESSURE: 89 MMHG | BODY MASS INDEX: 47.09 KG/M2 | HEIGHT: 66 IN

## 2024-12-02 DIAGNOSIS — G47.33 OSA ON CPAP: Primary | ICD-10-CM

## 2024-12-02 DIAGNOSIS — E66.01 MORBID OBESITY WITH BMI OF 50.0-59.9, ADULT: ICD-10-CM

## 2024-12-02 PROCEDURE — 99213 OFFICE O/P EST LOW 20 MIN: CPT | Performed by: INTERNAL MEDICINE

## 2024-12-02 PROCEDURE — 3079F DIAST BP 80-89 MM HG: CPT | Performed by: INTERNAL MEDICINE

## 2024-12-02 PROCEDURE — 1160F RVW MEDS BY RX/DR IN RCRD: CPT | Performed by: INTERNAL MEDICINE

## 2024-12-02 PROCEDURE — 3077F SYST BP >= 140 MM HG: CPT | Performed by: INTERNAL MEDICINE

## 2024-12-02 PROCEDURE — G0463 HOSPITAL OUTPT CLINIC VISIT: HCPCS

## 2024-12-02 PROCEDURE — 1159F MED LIST DOCD IN RCRD: CPT | Performed by: INTERNAL MEDICINE

## 2024-12-02 NOTE — PROGRESS NOTES
"  [facility]  Sleep medicine  [address] [place] [address]  [place]   [place] [address]  Phone: [phone]  Fax: [phone]      SLEEP CLINIC FOLLOW UP PROGRESS NOTE.    Anita Estrada  [id number]   1960  64 y.o.  female      PCP: Maris Thurman APRN      Date of visit: 12/2/2024    Chief Complaint   Patient presents with    Sleep Apnea    Obesity       HPI:  This is a 64 y.o. years old patient is here for the management of obstructive sleep apnea.  Sleep apnea is moderate in severity with a AHI of 25/hr. Patient is using positive airway pressure therapy with CPAP 9 and the symptoms of sleep apnea have improved significantly on the therapy. Normally patient goes to bed at 11 PM and wakes up at 9 AM .  The patient wakes up 2 time(s) during the night and has no problem going back to sleep.  Feels refreshed after waking up.     Medications and allergies are reviewed by me and documented in the encounter.     SOCIAL (habits pertaining to sleep medicine)  History tobacco use:No   History of alcohol use: 0 per week  Caffeine use: 0     REVIEW OF SYSTEMS:   Pertaining positive symptoms are:  [place] Sleepiness Scale :Total score: 6   Morning headache      PHYSICAL EXAMINATION:  CONSTITUTIONAL:  Vitals:    12/02/24 1300   BP: 152/89   Pulse: 69   SpO2: 100%   Weight: (!) 140 kg (308 lb 1.6 oz)   Height: 167.6 cm (65.98\")    Body mass index is 49.75 kg/m².   NOSE: nasal passages are clear, No deformities noted   RESP SYSTEM: Not in any respiratory distress, no chest deformities noted,   CARDIOVASULAR: No edema noted  NEURO: Oriented x 3, gait normal,  Mood and affect appeared appropriate      Data reviewed:  The Smart card downloaded on 12/2/2024 has been reviewed independently by me for compliance and discussed the data with the patient.   Compliance; 99%  More than 4 hr use, 93%  Average use of the device 6 hours and 5 minutes per night  Residual AHI: 1.9 /hr (goal < 5.0 /hr)  Mask type: Nasal " pillows  Device: DreamStation 2  DME: [employer]      ASSESSMENT AND PLAN:  Obstructive sleep apnea ( G 47.33).  The symptoms of sleep apnea have improved with the device and the treatment.  Patient's compliance with the device is excellent for treatment of sleep apnea.  I have independently reviewed the smart card down load and discussed with the patient the download data and encouarged the patient to continue to use the device.The residual AHI is acceptable. The device is benefiting the patient and the device is medically necessary.  Without proper control of sleep apnea and good compliance there is a increased risk for hypertension, diabetes mellitus and nonrestorative sleep with hypersomnia which can increase risk for motor vehicle accidents.  Untreated sleep apnea is also a risk factor for development of atrial fibrillation, pulmonary hypertension, insulin resistance and stroke. The patient is also instructed to get the supplies from the [employer] company and and change them on a regular basis.  A prescription for supplies has been sent to the [employer] company.  I have also discussed the good sleep hygiene habits and adequate amount of sleep needed for good health.  Obesity  3 with BMI is Body mass index is 49.75 kg/m².. I have discuss the relationship between the weight and sleep apnea. The benefit of weight loss in reducing severity of sleep apnea was discussed. Discussed diet and exercise with the patient to achieve ideal BMI.  Morning headache.  Her blood pressure is slightly high.  She is not taking medications on a consistent basis so I reinforced that she should take the medication on a daily basis  Return in about 1 year (around 12/2/2025) for with smart card down load. . Patient's questions were answered.    12/2/2024  John Maldonado MD  Sleep Medicine.  Medical Director,   [facility], [facility] sleep centers.

## 2025-02-12 ENCOUNTER — OFFICE VISIT (OUTPATIENT)
Dept: PODIATRY | Facility: CLINIC | Age: 65
End: 2025-02-12
Payer: MEDICARE

## 2025-02-12 VITALS
WEIGHT: 293 LBS | HEART RATE: 73 BPM | TEMPERATURE: 97 F | BODY MASS INDEX: 49.41 KG/M2 | OXYGEN SATURATION: 95 % | SYSTOLIC BLOOD PRESSURE: 134 MMHG | DIASTOLIC BLOOD PRESSURE: 84 MMHG

## 2025-02-12 DIAGNOSIS — L60.0 ONYCHOCRYPTOSIS: Primary | ICD-10-CM

## 2025-02-12 DIAGNOSIS — B35.1 ONYCHOMYCOSIS: ICD-10-CM

## 2025-02-12 DIAGNOSIS — M79.672 FOOT PAIN, BILATERAL: ICD-10-CM

## 2025-02-12 DIAGNOSIS — M79.671 FOOT PAIN, BILATERAL: ICD-10-CM

## 2025-02-12 NOTE — PROGRESS NOTES
[facility] - PODIATRY    Today's Date: 02/12/25    Patient Name: Anita Estrada  MRN: [id number]  CSN: [id number]  PCP: Maris Thurman APRN, Last PCP Visit: 4/24/2024  Referring Provider: No ref. provider found    SUBJECTIVE     Chief Complaint   Patient presents with    Left Foot - Follow-up, Nail Problem    Right Foot - Follow-up, Nail Problem     HPI: Anita Estrada, a 64 y.o.female, comes to clinic.    New, Established, New Problem:  established   Location:  Toenails  Duration:   Greater than five years  Onset:  Gradual  Nature:  sore with palpation.  Stable, worsening, improving:   stable  Aggravating factors:  Pain with shoe gear and ambulation.  Previous Treatment:  debridement    Medical changes:  no changes    Patient denies any fevers, chills, nausea, vomiting, shortness of breath, nor any other constitutional signs nor symptoms.       I have reviewed/confirmed previously documented HPI with no changes.     Past Medical History:   Diagnosis Date    Acid reflux     Arthritis     Asthma     Carpal tunnel syndrome     Claustrophobia     Eczema     Essential hypertension 03/02/2015    Foot pain, bilateral     History of transfusion     1976,1983    Hypertension     Ingrowing nail 08/08/2018    Limb swelling     Lumbar back pain     Migraine headache     Obesity     Plantar fascial fibromatosis of right foot 10/11/2018    Pressure ulcer, stage 1     Sleep apnea with use of continuous positive airway pressure (CPAP)     Tinea unguium      Past Surgical History:   Procedure Laterality Date    BREAST LUMPECTOMY Right 2011    BREAST LUMPECTOMY      CARPAL TUNNEL RELEASE Right 03/06/2015, 9/15/17    DILATATION AND CURETTAGE  1976    ENDOSCOPY N/A 3/19/2024    Procedure: ESOPHAGOGASTRODUODENOSCOPY WITH BIOPSY;  Surgeon: Carmine Garcia Jr., MD;  Location: [facility] ENDOSCOPY;  Service: General;  Laterality: N/A;  PRE: DYSPEPSIA /  POST: GASTRITIS    HYSTERECTOMY  1983    SHOULDER ARTHROSCOPY  Left      Family History   Problem Relation Age of Onset    Arthritis Mother         Family history of certain chronic disabling diseases    Osteoporosis Mother     Cancer Father         unspecified    Stroke Other         not specified    Diabetes Other         unspecified type    Malig Hyperthermia Neg Hx      Social History     Socioeconomic History    Marital status:    Tobacco Use    Smoking status: Former     Current packs/day: 0.50     Average packs/day: 0.5 packs/day for 38.1 years (19.1 ttl pk-yrs)     Types: Cigarettes     Start date: 1974     Quit date: 2008    Smokeless tobacco: Never   Vaping Use    Vaping status: Former    Substances: Nicotine, Flavoring    Devices: Disposable   Substance and Sexual Activity    Alcohol use: Yes     Comment: social    Drug use: Never    Sexual activity: Yes     Partners: Male     Birth control/protection: Hysterectomy     No Known Allergies  Current Outpatient Medications   Medication Sig Dispense Refill    acetaminophen (TYLENOL) 650 MG 8 hr tablet Take 1 tablet by mouth Every 8 (Eight) Hours As Needed for Mild Pain.      Diclofenac Sodium (VOLTAREN) 1 % gel gel APPLY TOPICALLY FOUR TIMES A DAY AS DIRECTED 100 g 3    fluticasone (Flonase) 50 MCG/ACT nasal spray 1 spray into the nostril(s) as directed by provider Daily. 18.2 mL 10    hydroCHLOROthiazide 12.5 MG tablet Take 1 tablet by mouth Daily. 90 tablet 3    ibuprofen (ADVIL,MOTRIN) 800 MG tablet TAKE 1 TABLET BY MOUTH EVERY 12 HOURS AS NEEDED FOR MILD PAIN 60 tablet 0    Ibuprofen 3 %, Gabapentin 10 %, Baclofen 2 %, lidocaine 4 % Apply 1-2 g topically to the appropriate area as directed 3 (Three) to 4 (Four) times daily. 90 g 2    losartan (Cozaar) 25 MG tablet Take 1 tablet by mouth Daily. 90 tablet 3    vitamin D (ERGOCALCIFEROL) 1.25 MG (42630 UT) capsule capsule Take 1 capsule by mouth 1 (One) Time Per Week. 15 capsule 1    gabapentin (NEURONTIN) 800 MG tablet Take 1 tablet by mouth 4 (Four) Times a Day.  (Patient not taking: Reported on 2025) 120 tablet 2     No current facility-administered medications for this visit.     Review of Systems   Constitutional: Negative.    Skin:         Painful toenails.   All other systems reviewed and are negative.      OBJECTIVE     Vitals:    25 1043   BP: 134/84   Pulse: 73   Temp: 97 °F (36.1 °C)   SpO2: 95%       Patient seen in no apparent distress.      PHYSICAL EXAM:     Foot/Ankle Exam    GENERAL  Appearance:  obese  Orientation:  AAOx3  Affect:  appropriate  Gait:  unimpaired  Assistance:  independent  Right shoe gear: casual shoe  Left shoe gear: casual shoe    VASCULAR     Right Foot Vascularity   Normal vascular exam    Dorsalis pedis:  2+  Posterior tibial:  2+  Skin temperature:  warm  Edema grading:  None  CFT:  < 3 seconds  Pedal hair growth:  Present  Varicosities:  none     Left Foot Vascularity   Normal vascular exam    Dorsalis pedis:  2+  Posterior tibial:  2+  Skin temperature:  warm  Edema gradin+ and non-pitting  CFT:  < 3 seconds  Pedal hair growth:  Present  Varicosities:  none     NEUROLOGIC     Right Foot Neurologic   Normal sensation    Light touch sensation: normal  Vibratory sensation: normal  Hot/Cold sensation: normal  Protective Sensation using [place] Monofilament:   Sites intact: 10  Sites tested: 10     Left Foot Neurologic   Normal sensation    Light touch sensation: normal  Vibratory sensation: normal  Hot/Cold sensation:  normal  Protective Sensation using [place]-B[place]anna Monofilament:   Sites intact: 10  Sites tested: 10    MUSCLE STRENGTH     Right Foot Muscle Strength   Foot dorsiflexion:  4  Foot plantar flexion:  4  Foot inversion:  4  Foot eversion:  4     Left Foot Muscle Strength   Foot dorsiflexion:  4  Foot plantar flexion:  4  Foot inversion:  4  Foot eversion:  4    RANGE OF MOTION     Right Foot Range of Motion   Foot and ankle ROM within normal limits       Left Foot Range of Motion   Foot and ankle ROM  within normal limits      DERMATOLOGIC      Right Foot Dermatologic   Skin  Right foot skin is intact.   Nails  1.  Positive for elongated, onychomycosis, abnormal thickness, subungual debris and ingrown toenail.  2.  Positive for elongated, onychomycosis, abnormal thickness, subungual debris and ingrown toenail.  3.  Positive for elongated, onychomycosis, abnormal thickness, subungual debris and ingrown toenail.  4.  Positive for elongated, onychomycosis, abnormal thickness, subungual debris and ingrown toenail.     Left Foot Dermatologic   Skin  Left foot skin is intact.   Nails  1.  Positive for elongated, onychomycosis, abnormal thickness, subungual debris and ingrown toenail.  2.  Positive for elongated, onychomycosis, abnormal thickness, subungual debris and ingrown toenail.  3.  Positive for elongated, onychomycosis, abnormal thickness, subungual debris and ingrown toenail.  4.  Positive for elongated, onychomycosis, abnormally thick, subungual debris and ingrown toenail.  5.  Positive for elongated, onychomycosis, abnormally thick, subungual debris and ingrown toenail.    I have reexamined the patient the results are consistent with the previously documented exam.    ASSESSMENT/PLAN     Diagnoses and all orders for this visit:    1. Onychocryptosis (Primary)    2. Foot pain, bilateral    3. Onychomycosis    Comprehensive lower extremity examination and evaluation was performed.    Discussed findings and treatment plan including risks, benefits, and treatment options with patient in detail. Patient agreed with treatment plan.    Toenails 1 through 5 on left and toenails 1 through 4 on right were debrided in thickness and length and then smoothed with a Dremel Tool.  Tolerated the procedure well without complications.    An After Visit Summary was printed and given to the patient at discharge, including (if requested) any available informative/educational handouts regarding diagnosis, treatment, or  medications. All questions were answered to patient/family satisfaction. Should symptoms fail to improve or worsen they agree to call or return to clinic or to go to the Emergency Department. Discussed the importance of following up with any needed screening tests/labs/specialist appointments and any requested follow-up recommended by me today. Importance of maintaining follow-up discussed and patient accepts that missed appointments can delay diagnosis and potentially lead to worsening of conditions.    Return in about 9 weeks (around 4/16/2025) for Toenail Care., or sooner if acute issues arise.    I have reviewed the assessment and plan and verified the accuracy of it. No changes to assessment and plan since the information was documented. Ranulfo Huizar DPM 02/12/25     I have dictated this note utilizing Dragon Dictation.  Please note that portions of this note were completed with a voice recognition program.  Part of this note may be an electronic transcription/translation of spoken language to printed text using the Dragon Dictation System.      This document has been electronically signed by Ranulfo Huizar DPM on February 12, 2025 10:55 EST

## 2025-03-17 ENCOUNTER — OFFICE VISIT (OUTPATIENT)
Dept: FAMILY MEDICINE CLINIC | Facility: CLINIC | Age: 65
End: 2025-03-17
Payer: MEDICARE

## 2025-03-17 ENCOUNTER — LAB (OUTPATIENT)
Dept: LAB | Facility: HOSPITAL | Age: 65
End: 2025-03-17
Payer: MEDICARE

## 2025-03-17 VITALS
HEART RATE: 67 BPM | OXYGEN SATURATION: 96 % | TEMPERATURE: 97.5 F | HEIGHT: 66 IN | WEIGHT: 293 LBS | SYSTOLIC BLOOD PRESSURE: 124 MMHG | BODY MASS INDEX: 47.09 KG/M2 | DIASTOLIC BLOOD PRESSURE: 78 MMHG

## 2025-03-17 DIAGNOSIS — I10 ESSENTIAL HYPERTENSION: ICD-10-CM

## 2025-03-17 DIAGNOSIS — R73.01 IMPAIRED FASTING GLUCOSE: ICD-10-CM

## 2025-03-17 DIAGNOSIS — Z13.220 SCREENING FOR LIPID DISORDERS: ICD-10-CM

## 2025-03-17 DIAGNOSIS — F41.0 ANXIETY ATTACK: ICD-10-CM

## 2025-03-17 DIAGNOSIS — Z79.899 MEDICATION MANAGEMENT: ICD-10-CM

## 2025-03-17 DIAGNOSIS — E55.9 VITAMIN D DEFICIENCY: ICD-10-CM

## 2025-03-17 DIAGNOSIS — Z00.00 ENCOUNTER FOR MEDICARE ANNUAL WELLNESS EXAM: Primary | ICD-10-CM

## 2025-03-17 DIAGNOSIS — R53.83 FATIGUE, UNSPECIFIED TYPE: ICD-10-CM

## 2025-03-17 LAB
25(OH)D3 SERPL-MCNC: 18.8 NG/ML (ref 30–100)
ALBUMIN SERPL-MCNC: 3.9 G/DL (ref 3.5–5.2)
ALBUMIN/GLOB SERPL: 1.1 G/DL
ALP SERPL-CCNC: 70 U/L (ref 39–117)
ALT SERPL W P-5'-P-CCNC: 18 U/L (ref 1–33)
AMPHET+METHAMPHET UR QL: NEGATIVE
AMPHETAMINE INTERNAL CONTROL: NORMAL
AMPHETAMINES UR QL: NEGATIVE
ANION GAP SERPL CALCULATED.3IONS-SCNC: 10 MMOL/L (ref 5–15)
AST SERPL-CCNC: 16 U/L (ref 1–32)
BACTERIA UR QL AUTO: ABNORMAL /HPF
BARBITURATE INTERNAL CONTROL: NORMAL
BARBITURATES UR QL SCN: NEGATIVE
BASOPHILS # BLD AUTO: 0.03 10*3/MM3 (ref 0–0.2)
BASOPHILS NFR BLD AUTO: 0.5 % (ref 0–1.5)
BENZODIAZ UR QL SCN: NEGATIVE
BENZODIAZEPINE INTERNAL CONTROL: NORMAL
BILIRUB SERPL-MCNC: 0.3 MG/DL (ref 0–1.2)
BILIRUB UR QL STRIP: NEGATIVE
BUN SERPL-MCNC: 13 MG/DL (ref 8–23)
BUN/CREAT SERPL: 14.8 (ref 7–25)
BUPRENORPHINE INTERNAL CONTROL: NORMAL
BUPRENORPHINE SERPL-MCNC: NEGATIVE NG/ML
CALCIUM SPEC-SCNC: 8.8 MG/DL (ref 8.6–10.5)
CANNABINOIDS SERPL QL: NEGATIVE
CHLORIDE SERPL-SCNC: 104 MMOL/L (ref 98–107)
CHOLEST SERPL-MCNC: 190 MG/DL (ref 0–200)
CLARITY UR: CLEAR
CO2 SERPL-SCNC: 27 MMOL/L (ref 22–29)
COCAINE INTERNAL CONTROL: NORMAL
COCAINE UR QL: NEGATIVE
COLOR UR: YELLOW
CREAT SERPL-MCNC: 0.88 MG/DL (ref 0.57–1)
DEPRECATED RDW RBC AUTO: 40.6 FL (ref 37–54)
EGFRCR SERPLBLD CKD-EPI 2021: 72.6 ML/MIN/1.73
EOSINOPHIL # BLD AUTO: 0.05 10*3/MM3 (ref 0–0.4)
EOSINOPHIL NFR BLD AUTO: 0.9 % (ref 0.3–6.2)
ERYTHROCYTE [DISTWIDTH] IN BLOOD BY AUTOMATED COUNT: 12.3 % (ref 12.3–15.4)
EXPIRATION DATE: NORMAL
FOLATE SERPL-MCNC: 6.14 NG/ML (ref 4.78–24.2)
GLOBULIN UR ELPH-MCNC: 3.7 GM/DL
GLUCOSE SERPL-MCNC: 86 MG/DL (ref 65–99)
GLUCOSE UR STRIP-MCNC: NEGATIVE MG/DL
HBA1C MFR BLD: 5.7 % (ref 4.8–5.6)
HCT VFR BLD AUTO: 36.2 % (ref 34–46.6)
HDLC SERPL-MCNC: 93 MG/DL (ref 40–60)
HGB BLD-MCNC: 12.1 G/DL (ref 12–15.9)
HGB UR QL STRIP.AUTO: NEGATIVE
HOLD SPECIMEN: NORMAL
HYALINE CASTS UR QL AUTO: ABNORMAL /LPF
IMM GRANULOCYTES # BLD AUTO: 0.01 10*3/MM3 (ref 0–0.05)
IMM GRANULOCYTES NFR BLD AUTO: 0.2 % (ref 0–0.5)
KETONES UR QL STRIP: NEGATIVE
LDLC SERPL CALC-MCNC: 84 MG/DL (ref 0–100)
LDLC/HDLC SERPL: 0.89 {RATIO}
LEUKOCYTE ESTERASE UR QL STRIP.AUTO: ABNORMAL
LYMPHOCYTES # BLD AUTO: 1.91 10*3/MM3 (ref 0.7–3.1)
LYMPHOCYTES NFR BLD AUTO: 33.2 % (ref 19.6–45.3)
Lab: NORMAL
MCH RBC QN AUTO: 30.4 PG (ref 26.6–33)
MCHC RBC AUTO-ENTMCNC: 33.4 G/DL (ref 31.5–35.7)
MCV RBC AUTO: 91 FL (ref 79–97)
MDMA (ECSTASY) INTERNAL CONTROL: NORMAL
MDMA UR QL SCN: NEGATIVE
METHADONE INTERNAL CONTROL: NORMAL
METHADONE UR QL SCN: NEGATIVE
METHAMPHETAMINE INTERNAL CONTROL: NORMAL
MONOCYTES # BLD AUTO: 0.61 10*3/MM3 (ref 0.1–0.9)
MONOCYTES NFR BLD AUTO: 10.6 % (ref 5–12)
MORPHINE INTERNAL CONTROL: NORMAL
MORPHINE/OPIATES SCREEN, URINE: NEGATIVE
NEUTROPHILS NFR BLD AUTO: 3.14 10*3/MM3 (ref 1.7–7)
NEUTROPHILS NFR BLD AUTO: 54.6 % (ref 42.7–76)
NITRITE UR QL STRIP: NEGATIVE
NRBC BLD AUTO-RTO: 0 /100 WBC (ref 0–0.2)
OXYCODONE INTERNAL CONTROL: NORMAL
OXYCODONE UR QL SCN: NEGATIVE
PCP UR QL SCN: NEGATIVE
PH UR STRIP.AUTO: 6 [PH] (ref 5–8)
PHENCYCLIDINE INTERNAL CONTROL: NORMAL
PLATELET # BLD AUTO: 244 10*3/MM3 (ref 140–450)
PMV BLD AUTO: 11.1 FL (ref 6–12)
POTASSIUM SERPL-SCNC: 4.3 MMOL/L (ref 3.5–5.2)
PROT SERPL-MCNC: 7.6 G/DL (ref 6–8.5)
PROT UR QL STRIP: ABNORMAL
RBC # BLD AUTO: 3.98 10*6/MM3 (ref 3.77–5.28)
RBC # UR STRIP: ABNORMAL /HPF
REF LAB TEST METHOD: ABNORMAL
SODIUM SERPL-SCNC: 141 MMOL/L (ref 136–145)
SP GR UR STRIP: 1.02 (ref 1–1.03)
SQUAMOUS #/AREA URNS HPF: ABNORMAL /HPF
THC INTERNAL CONTROL: NORMAL
TRIGL SERPL-MCNC: 73 MG/DL (ref 0–150)
TSH SERPL DL<=0.05 MIU/L-ACNC: 1.86 UIU/ML (ref 0.27–4.2)
UROBILINOGEN UR QL STRIP: ABNORMAL
VIT B12 BLD-MCNC: 422 PG/ML (ref 211–946)
VLDLC SERPL-MCNC: 13 MG/DL (ref 5–40)
WBC # UR STRIP: ABNORMAL /HPF
WBC NRBC COR # BLD AUTO: 5.75 10*3/MM3 (ref 3.4–10.8)

## 2025-03-17 PROCEDURE — 81001 URINALYSIS AUTO W/SCOPE: CPT

## 2025-03-17 PROCEDURE — 82607 VITAMIN B-12: CPT

## 2025-03-17 PROCEDURE — 80061 LIPID PANEL: CPT

## 2025-03-17 PROCEDURE — 85025 COMPLETE CBC W/AUTO DIFF WBC: CPT

## 2025-03-17 PROCEDURE — 36415 COLL VENOUS BLD VENIPUNCTURE: CPT

## 2025-03-17 PROCEDURE — 83036 HEMOGLOBIN GLYCOSYLATED A1C: CPT

## 2025-03-17 PROCEDURE — 80053 COMPREHEN METABOLIC PANEL: CPT

## 2025-03-17 PROCEDURE — 84443 ASSAY THYROID STIM HORMONE: CPT

## 2025-03-17 PROCEDURE — 82306 VITAMIN D 25 HYDROXY: CPT

## 2025-03-17 PROCEDURE — 82746 ASSAY OF FOLIC ACID SERUM: CPT

## 2025-03-17 RX ORDER — LORAZEPAM 1 MG/1
1 TABLET ORAL EVERY 8 HOURS PRN
Qty: 6 TABLET | Refills: 0 | Status: SHIPPED | OUTPATIENT
Start: 2025-03-17

## 2025-03-17 NOTE — PROGRESS NOTES
Subjective   The ABCs of the Annual Wellness Visit  Medicare Wellness Visit      Anita Estrada is a 64 y.o. patient who presents for a Medicare Wellness Visit.    The following portions of the patient's history were reviewed and   updated as appropriate: allergies, current medications, past family history, past medical history, past social history, past surgical history, and problem list.    Compared to one year ago, the patient's physical   health is better.  Compared to one year ago, the patient's mental   health is better.    Recent Hospitalizations:  She was not admitted to the hospital during the last year.     Current Medical Providers:  Patient Care Team:  Maris Thurman APRN as PCP - General (Nurse Practitioner)  Ranulfo Huizar DPM as Consulting Physician (Podiatry)    Outpatient Medications Prior to Visit   Medication Sig Dispense Refill    acetaminophen (TYLENOL) 650 MG 8 hr tablet Take 1 tablet by mouth Every 8 (Eight) Hours As Needed for Mild Pain.      Diclofenac Sodium (VOLTAREN) 1 % gel gel APPLY TOPICALLY FOUR TIMES A DAY AS DIRECTED 100 g 3    fluticasone (Flonase) 50 MCG/ACT nasal spray 1 spray into the nostril(s) as directed by provider Daily. 18.2 mL 10    hydroCHLOROthiazide 12.5 MG tablet Take 1 tablet by mouth Daily. 90 tablet 3    ibuprofen (ADVIL,MOTRIN) 800 MG tablet TAKE 1 TABLET BY MOUTH EVERY 12 HOURS AS NEEDED FOR MILD PAIN 60 tablet 0    Ibuprofen 3 %, Gabapentin 10 %, Baclofen 2 %, lidocaine 4 % Apply 1-2 g topically to the appropriate area as directed 3 (Three) to 4 (Four) times daily. 90 g 2    losartan (Cozaar) 25 MG tablet Take 1 tablet by mouth Daily. 90 tablet 3    vitamin D (ERGOCALCIFEROL) 1.25 MG (73012 UT) capsule capsule Take 1 capsule by mouth 1 (One) Time Per Week. 15 capsule 1    gabapentin (NEURONTIN) 800 MG tablet Take 1 tablet by mouth 4 (Four) Times a Day. (Patient not taking: Reported on 12/2/2024) 120 tablet 2     No facility-administered  "medications prior to visit.     No opioid medication identified on active medication list. I have reviewed chart for other potential  high risk medication/s and harmful drug interactions in the elderly.      Aspirin is not on active medication list.  Aspirin use is not indicated based on review of current medical condition/s. Risk of harm outweighs potential benefits.  .    Patient Active Problem List   Diagnosis    Arthritis    Acid reflux    Carpal tunnel syndrome    DDD (degenerative disc disease), lumbar    Eczema    Essential hypertension    Ingrown nail    Limb swelling    Lumbar back pain    Migraine without aura    Pain of foot    Plantar fascial fibromatosis    Pressure ulcer, stage 1    Primary localized osteoarthrosis of left lower leg    EVAN on CPAP    Tinea unguium    Primary osteoarthritis of left knee    Chronic left shoulder pain    Anxiety attack    Morbid obesity with BMI of 50.0-59.9, adult    Vitamin D deficiency    Edema    Medication management    Pre-op evaluation    Depression with anxiety     Advance Care Planning Advance Directive is not on file.  ACP discussion was held with the patient during this visit. Patient does not have an advance directive, declines further assistance.            Objective   Vitals:    03/17/25 0935   BP: 124/78   BP Location: Left arm   Patient Position: Sitting   Cuff Size: Adult   Pulse: 67   Temp: 97.5 °F (36.4 °C)   SpO2: 96%   Weight: (!) 144 kg (317 lb 3.2 oz)   Height: 167.6 cm (65.98\")   PainSc: 3    PainLoc: Knee       Estimated body mass index is 51.22 kg/m² as calculated from the following:    Height as of this encounter: 167.6 cm (65.98\").    Weight as of this encounter: 144 kg (317 lb 3.2 oz).    Class 3 Severe Obesity (BMI >=40). Obesity-related health conditions include the following: hypertension and dyslipidemias. Obesity is improving with lifestyle modifications. BMI is is above average; BMI management plan is completed. We discussed portion " control and increasing exercise.         Does the patient have evidence of cognitive impairment? No                                                                                                Health  Risk Assessment    Smoking Status:  Social History     Tobacco Use   Smoking Status Former    Current packs/day: 0.50    Average packs/day: 0.5 packs/day for 38.2 years (19.1 ttl pk-yrs)    Types: Cigarettes    Start date: 1974    Quit date: 2008   Smokeless Tobacco Never     Alcohol Consumption:  Social History     Substance and Sexual Activity   Alcohol Use Yes    Comment: social       Fall Risk Screen  STEADI Fall Risk Assessment was completed, and patient is at MODERATE risk for falls. Assessment completed on:3/17/2025    Depression Screening   Little interest or pleasure in doing things? Not at all   Feeling down, depressed, or hopeless? Not at all   PHQ-2 Total Score 0      Health Habits and Functional and Cognitive Screening:      3/17/2025     9:37 AM   Functional & Cognitive Status   Do you have difficulty preparing food and eating? No   Do you have difficulty bathing yourself, getting dressed or grooming yourself? No   Do you have difficulty using the toilet? No   Do you have difficulty moving around from place to place? No   Do you have trouble with steps or getting out of a bed or a chair? No   Current Diet Well Balanced Diet   Dental Exam Up to date   Eye Exam Up to date   Exercise (times per week) 0 times per week   Current Exercises Include No Regular Exercise   Do you need help using the phone?  No   Are you deaf or do you have serious difficulty hearing?  No   Do you need help to go to places out of walking distance? No   Do you need help shopping? No   Do you need help preparing meals?  No   Do you need help with housework?  No   Do you need help with laundry? No   Do you need help taking your medications? No   Do you need help managing money? No   Do you ever drive or ride in a car without wearing  a seat belt? No   Have you felt unusual stress, anger or loneliness in the last month? No   Who do you live with? Alone   If you need help, do you have trouble finding someone available to you? No   Have you been bothered in the last four weeks by sexual problems? No   Do you have difficulty concentrating, remembering or making decisions? No           Age-appropriate Screening Schedule:  Refer to the list below for future screening recommendations based on patient's age, sex and/or medical conditions. Orders for these recommended tests are listed in the plan section. The patient has been provided with a written plan.    Health Maintenance List  Health Maintenance   Topic Date Due    LIPID PANEL  09/13/2024    ZOSTER VACCINE (2 of 2) 03/17/2025 (Originally 11/25/2022)    COVID-19 Vaccine (4 - 2024-25 season) 03/19/2025 (Originally 9/1/2024)    INFLUENZA VACCINE  03/31/2025 (Originally 7/1/2024)    Pneumococcal Vaccine 50+ (1 of 1 - PCV) 03/17/2026 (Originally 11/29/2008)    TDAP/TD VACCINES (1 - Tdap) 03/17/2026 (Originally 11/29/1977)    ANNUAL WELLNESS VISIT  03/17/2026    BMI FOLLOWUP  03/17/2026    MAMMOGRAM  07/25/2026    DXA SCAN  07/25/2026    COLORECTAL CANCER SCREENING  01/10/2030    HEPATITIS C SCREENING  Completed    HEMOGLOBIN A1C  Discontinued    URINE MICROALBUMIN-CREATININE RATIO (uACR)  Discontinued                                                                                                                                                CMS Preventative Services Quick Reference  Risk Factors Identified During Encounter  Immunizations Discussed/Encouraged: Tdap and Shingrix    The above risks/problems have been discussed with the patient.  Pertinent information has been shared with the patient in the After Visit Summary.  An After Visit Summary and PPPS were made available to the patient.    Follow Up:   Next Medicare Wellness visit to be scheduled in 1 year.          Additional E&M Note during same  "encounter follows:  Patient has multiple medical problems which are significant and separately identifiable that require additional work above and beyond the Medicare Wellness Visit.      Chief Complaint  Medicare wellness exam    Anita Estrada is a 64 y.o. female who presents to [facility]     Pt c/o anxiety during a long car ride, gets scared to death riding in a car, patient has an upcoming long car ride with family to [place] states this car ride is going on last longer than as they do plan to make stops  States she has previously taken xanax when she has this type of anxiety    Objective   Vital Signs:   Vitals:    03/17/25 0935   BP: 124/78   BP Location: Left arm   Patient Position: Sitting   Cuff Size: Adult   Pulse: 67   Temp: 97.5 °F (36.4 °C)   SpO2: 96%   Weight: (!) 144 kg (317 lb 3.2 oz)   Height: 167.6 cm (65.98\")   PainSc: 3    PainLoc: Knee       Wt Readings from Last 3 Encounters:   03/17/25 (!) 144 kg (317 lb 3.2 oz)   02/12/25 (!) 139 kg (306 lb)   12/02/24 (!) 140 kg (308 lb 1.6 oz)     BP Readings from Last 3 Encounters:   03/17/25 124/78   02/12/25 134/84   12/02/24 152/89       Physical Exam  HENT:      Right Ear: Tympanic membrane normal.      Left Ear: Tympanic membrane normal.      Nose: Nose normal.      Mouth/Throat:      Mouth: Mucous membranes are moist.   Eyes:      Conjunctiva/sclera: Conjunctivae normal.   Neck:      Thyroid: No thyroid tenderness.      Vascular: No carotid bruit.   Cardiovascular:      Rate and Rhythm: Normal rate and regular rhythm.      Heart sounds: Normal heart sounds. No murmur heard.  Pulmonary:      Effort: Pulmonary effort is normal.      Breath sounds: Normal breath sounds.   Abdominal:      General: Bowel sounds are normal.      Palpations: Abdomen is soft.   Musculoskeletal:      Right lower leg: No edema.      Left lower leg: No edema.   Skin:     General: Skin is warm and dry.   Neurological:      Mental " Status: She is alert and oriented to person, place, and time.   Psychiatric:         Mood and Affect: Mood normal.         Behavior: Behavior normal.         The following data was reviewed by CHYNA Paulino on 03/17/2025        Assessment & Plan   Diagnoses and all orders for this visit:    1. Encounter for Medicare annual wellness exam (Primary)    2. Anxiety attack    3. Medication management        Encounter for Medicare annual wellness immunizations screening reviewed  Anxiety attack will provide a prescription for Ativan as needed use for upcoming car trip [place] reviewed urine drug screen obtained in office         FOLLOW UP  Return in about 3 months (around 6/17/2025).  Patient was given instructions and counseling regarding her condition or for health maintenance advice. Please see specific information pulled into the AVS if appropriate.     CHYNA Paulino  03/17/25  09:52 EDT

## 2025-03-19 ENCOUNTER — TELEPHONE (OUTPATIENT)
Dept: FAMILY MEDICINE CLINIC | Facility: CLINIC | Age: 65
End: 2025-03-19
Payer: MEDICARE

## 2025-03-19 NOTE — TELEPHONE ENCOUNTER
Caller: Anita Estrada    Relationship: Self    Best call back number: [phone]     Caller requesting test results: YES    What test was performed: LABS    When was the test performed: 03.17.2025    Where was the test performed: [facility]    Additional notes: PATIENT HAS SEEN HER RESULTS ON MYCHART BUT IS CONCERNED ABOUT HER A1C AND VITAMIN D. PATIENT WANTING TO DISCUSS RESULTS WITH PHYSICIAN.

## 2025-03-25 ENCOUNTER — OFFICE VISIT (OUTPATIENT)
Dept: ORTHOPEDIC SURGERY | Facility: CLINIC | Age: 65
End: 2025-03-25
Payer: MEDICARE

## 2025-03-25 VITALS — OXYGEN SATURATION: 92 % | HEART RATE: 79 BPM | DIASTOLIC BLOOD PRESSURE: 95 MMHG | SYSTOLIC BLOOD PRESSURE: 150 MMHG

## 2025-03-25 DIAGNOSIS — M17.12 PRIMARY OSTEOARTHRITIS OF LEFT KNEE: Primary | ICD-10-CM

## 2025-03-25 PROCEDURE — 3079F DIAST BP 80-89 MM HG: CPT | Performed by: ORTHOPAEDIC SURGERY

## 2025-03-25 PROCEDURE — 20610 DRAIN/INJ JOINT/BURSA W/O US: CPT | Performed by: ORTHOPAEDIC SURGERY

## 2025-03-25 PROCEDURE — 3077F SYST BP >= 140 MM HG: CPT | Performed by: ORTHOPAEDIC SURGERY

## 2025-03-25 RX ORDER — LIDOCAINE HYDROCHLORIDE 10 MG/ML
5 INJECTION, SOLUTION INFILTRATION; PERINEURAL
Status: COMPLETED | OUTPATIENT
Start: 2025-03-25 | End: 2025-03-25

## 2025-03-25 RX ORDER — ERGOCALCIFEROL 1.25 MG/1
50000 CAPSULE, LIQUID FILLED ORAL WEEKLY
Qty: 15 CAPSULE | Refills: 1 | Status: SHIPPED | OUTPATIENT
Start: 2025-03-25

## 2025-03-25 RX ORDER — FLUTICASONE PROPIONATE 50 MCG
1 SPRAY, SUSPENSION (ML) NASAL DAILY
Qty: 18 G | Refills: 0 | Status: SHIPPED | OUTPATIENT
Start: 2025-03-25

## 2025-03-25 RX ADMIN — LIDOCAINE HYDROCHLORIDE 5 ML: 10 INJECTION, SOLUTION INFILTRATION; PERINEURAL at 14:59

## 2025-03-25 NOTE — PROGRESS NOTES
Chief Complaint  Follow-up of the Left Knee     Subjective      Anita Estrada presents to [facility] for follow up of the left knee. She has had pain for years.  She had an injection in the past.  She has pain with prolonged standing, ambulation and stairs.  She had a gel injection on 11/26/24.  She has mild swelling.  She has had no recent injury or falls.      No Known Allergies     Social History     Socioeconomic History    Marital status:    Tobacco Use    Smoking status: Former     Current packs/day: 0.50     Average packs/day: 0.5 packs/day for 38.2 years (19.1 ttl pk-yrs)     Types: Cigarettes     Start date: 1974     Quit date: 2008    Smokeless tobacco: Never   Vaping Use    Vaping status: Former    Substances: Nicotine, Flavoring    Devices: Disposable   Substance and Sexual Activity    Alcohol use: Yes     Comment: social    Drug use: Never    Sexual activity: Yes     Partners: Male     Birth control/protection: Hysterectomy        I reviewed the patient's chief complaint, history of present illness, review of systems, past medical history, surgical history, family history, social history, medications, and allergy list.     Review of Systems     Constitutional: Denies fevers, chills, weight loss  Cardiovascular: Denies chest pain, shortness of breath  Skin: Denies rashes, acute skin changes  Neurologic: Denies headache, loss of consciousness        Vital Signs:   /95   Pulse 79   SpO2 92%          Physical Exam  General: Alert. No acute distress    Ortho Exam        LEFT KNEE Flexion 110. Extension -3. Stable to varus/valgus stress. Stable to anterior/posterior drawer. Neurovascularly intact. Negative Sindy. Negative Lachman. Positive EHL, FHL, HS and TA. Sensation intact to light touch all 5 nerves of the foot. Ambulates with Antalgic gait. Patella is well tracking. Calf supple, non-tender. Positive tenderness to the medial joint line. Positive  tenderness to the lateral joint line. Positive Crepitus. Good strength to hamstrings, quadriceps, dorsiflexors, and plantar flexors.  Knee Extensor Mechanism intact Mild swelling.         Large Joint Arthrocentesis  Date/Time: 3/25/2025 2:59 PM  Consent given by: patient  Site marked: site marked  Timeout: Immediately prior to procedure a time out was called to verify the correct patient, procedure, equipment, support staff and site/side marked as required   Supporting Documentation  Indications: pain   Procedure Details  Location: -   Location: left knee.Needle gauge: 21 G.  Medications administered: 5 mL lidocaine 1 %; 32 mg Triamcinolone Acetonide 32 MG  Patient tolerance: patient tolerated the procedure well with no immediate complications    This injection documentation was Scribed for Chad Todd MD by Mehreen Mendez MA.  03/25/25   15:00 EDT      Imaging Results (Most Recent)       None             Result Review :               Assessment and Plan     Diagnoses and all orders for this visit:    1. Primary osteoarthritis of left knee (Primary)        Discussed the treatment plan with the patient.     Discussed the risks and benefits of conservative measures. The patient expressed understanding and wished to proceed with a left knee Zilretta injection.  She tolerated the injection well.     Will obtain X-Rays of the left knee at next visit.    Call or return if worsening symptoms.    Follow Up     PRN      Patient was given instructions and counseling regarding her condition or for health maintenance advice. Please see specific information pulled into the AVS if appropriate.     Scribed for Chad Todd MD by Tania Sumner MA.  03/25/25   14:38 EDT    I have personally performed the services described in this document as scribed by the above individual and it is both accurate and complete. Chad Todd MD 03/25/25

## 2025-04-10 ENCOUNTER — OFFICE VISIT (OUTPATIENT)
Dept: ORTHOPEDIC SURGERY | Facility: CLINIC | Age: 65
End: 2025-04-10
Payer: MEDICARE

## 2025-04-10 VITALS
DIASTOLIC BLOOD PRESSURE: 78 MMHG | BODY MASS INDEX: 48.82 KG/M2 | WEIGHT: 293 LBS | HEIGHT: 65 IN | SYSTOLIC BLOOD PRESSURE: 117 MMHG | HEART RATE: 74 BPM | OXYGEN SATURATION: 95 %

## 2025-04-10 DIAGNOSIS — M17.11 OSTEOARTHRITIS OF RIGHT KNEE, UNSPECIFIED OSTEOARTHRITIS TYPE: ICD-10-CM

## 2025-04-10 DIAGNOSIS — M25.561 RIGHT KNEE PAIN, UNSPECIFIED CHRONICITY: Primary | ICD-10-CM

## 2025-04-10 RX ORDER — LIDOCAINE HYDROCHLORIDE 10 MG/ML
5 INJECTION, SOLUTION INFILTRATION; PERINEURAL
Status: COMPLETED | OUTPATIENT
Start: 2025-04-10 | End: 2025-04-10

## 2025-04-10 RX ADMIN — LIDOCAINE HYDROCHLORIDE 5 ML: 10 INJECTION, SOLUTION INFILTRATION; PERINEURAL at 15:07

## 2025-04-10 NOTE — PROGRESS NOTES
"Chief Complaint  Initial Evaluation of the Right Knee     Subjective      Anita Estrada presents to [facility] for initial evaluation of the right knee.  She ambulates with a cane for support and stability.  She started having pain in the right knee and had a fall and notes instability.  She had a left knee Zilretta injection on 3/25/25.  She notes pain with prolonged standing, ambulation and stairs.      No Known Allergies     Social History     Socioeconomic History    Marital status:    Tobacco Use    Smoking status: Former     Current packs/day: 0.50     Average packs/day: 0.5 packs/day for 38.3 years (19.1 ttl pk-yrs)     Types: Cigarettes     Start date: 1974     Quit date: 2008    Smokeless tobacco: Never   Vaping Use    Vaping status: Former    Substances: Nicotine, Flavoring    Devices: Disposable   Substance and Sexual Activity    Alcohol use: Yes     Comment: social    Drug use: Never    Sexual activity: Yes     Partners: Male     Birth control/protection: Hysterectomy        I reviewed the patient's chief complaint, history of present illness, review of systems, past medical history, surgical history, family history, social history, medications, and allergy list.     Review of Systems     Constitutional: Denies fevers, chills, weight loss  Cardiovascular: Denies chest pain, shortness of breath  Skin: Denies rashes, acute skin changes  Neurologic: Denies headache, loss of consciousness        Vital Signs:   /78   Pulse 74   Ht 165.1 cm (65\")   Wt (!) 144 kg (317 lb)   SpO2 95%   BMI 52.75 kg/m²          Physical Exam  General: Alert. No acute distress    Ortho Exam        RIGHT KNEE Flexion 110. Extension -5. Stable to varus/valgus stress. Stable to anterior/posterior drawer. Neurovascularly intact. Negative Sindy. Negative Lachman. Positive EHL, FHL, HS and TA. Sensation intact to light touch all 5 nerves of the foot. Ambulates with Antalgic gait. " Patella is well tracking. Calf supple, non-tender. Positive tenderness to the medial joint line. Positive tenderness to the lateral joint line. Positive Crepitus. Good strength to hamstrings, quadriceps, dorsiflexors, and plantar flexors.  Knee Extensor Mechanism intact Mild swelling.         Large Joint Arthrocentesis: R knee  Date/Time: 4/10/2025 3:07 PM  Consent given by: patient  Site marked: site marked  Timeout: Immediately prior to procedure a time out was called to verify the correct patient, procedure, equipment, support staff and site/side marked as required   Supporting Documentation  Indications: pain   Procedure Details  Location: knee - R knee  Preparation: Patient was prepped and draped in the usual sterile fashion  Needle gauge: 21 G.  Medications administered: 5 mL lidocaine 1 %; 32 mg Triamcinolone Acetonide 32 MG  Patient tolerance: patient tolerated the procedure well with no immediate complications      This injection documentation was Scribed for Chad Todd MD by Leslie Slater.  04/10/25   15:07 EDT      Imaging Results (Most Recent)       Procedure Component Value Units Date/Time    XR Knee 3 View Right [[id number]] Resulted: 04/10/25 1421     Updated: 04/10/25 1424             Result Review :     X-Ray Report:  Right knee X-Ray  Indication: Evaluation of the right knee  AP/Lateral and [place] view(s)  Findings: Moderately advanced degenerative changes on the medial aspect of the knee.  Osteophyte formation noted.    Prior studies available for comparison: no             Assessment and Plan     Diagnoses and all orders for this visit:    1. Right knee pain, unspecified chronicity (Primary)  -     XR Knee 3 View Right    2. Osteoarthritis of right knee, unspecified osteoarthritis type  -     Large Joint Arthrocentesis: R knee        Discussed the treatment plan with the patient. I reviewed the X-rays that were obtained today with the patient.     Discussed the risks and benefits of  conservative measures. The patient expressed understanding and wished to proceed with a right knee Zilretta injection.  She tolerated the injections well.     Discussed with the patient that due to the steroid injection given today in the office they may see an increase in blood sugar for a few days. Advised patient to monitor sugar after receiving the injection.     Discussed possibility of a reaction from the injection.  Discussed the possibility that the injection may not completely improve or remove the pain.  Discussed the risk of infection.    She can call back to submit for Visco supplementation of one or both knees if these injections aren't helpful.       Call or return if worsening symptoms.    Follow Up     PRN      Patient was given instructions and counseling regarding her condition or for health maintenance advice. Please see specific information pulled into the AVS if appropriate.     Scribed for Chad Todd MD by Tania Sumner MA.  04/10/25   14:28 EDT    I have personally performed the services described in this document as scribed by the above individual and it is both accurate and complete. Chad Todd MD 04/10/25

## 2025-04-21 RX ORDER — FLUTICASONE PROPIONATE 50 MCG
SPRAY, SUSPENSION (ML) NASAL
Qty: 16 G | Refills: 0 | Status: SHIPPED | OUTPATIENT
Start: 2025-04-21

## 2025-04-21 NOTE — TELEPHONE ENCOUNTER
Upcoming Appts  With Family Medicine ([facility])  07/07/2025 at 8:45 AM  Last Office Visit - This Dept  3/17/2025 Jyoti Huertas APRN

## 2025-04-29 ENCOUNTER — TRANSCRIBE ORDERS (OUTPATIENT)
Dept: ADMINISTRATIVE | Facility: HOSPITAL | Age: 65
End: 2025-04-29
Payer: MEDICARE

## 2025-04-29 DIAGNOSIS — Z12.31 SCREENING MAMMOGRAM, ENCOUNTER FOR: Primary | ICD-10-CM

## 2025-05-05 ENCOUNTER — OFFICE VISIT (OUTPATIENT)
Dept: PODIATRY | Facility: CLINIC | Age: 65
End: 2025-05-05
Payer: MEDICARE

## 2025-05-05 VITALS
DIASTOLIC BLOOD PRESSURE: 65 MMHG | HEART RATE: 70 BPM | HEIGHT: 65 IN | SYSTOLIC BLOOD PRESSURE: 139 MMHG | OXYGEN SATURATION: 98 % | WEIGHT: 293 LBS | BODY MASS INDEX: 48.82 KG/M2

## 2025-05-05 DIAGNOSIS — M79.671 FOOT PAIN, BILATERAL: ICD-10-CM

## 2025-05-05 DIAGNOSIS — L60.0 ONYCHOCRYPTOSIS: Primary | ICD-10-CM

## 2025-05-05 DIAGNOSIS — R26.2 DIFFICULTY WALKING: ICD-10-CM

## 2025-05-05 DIAGNOSIS — B35.1 ONYCHOMYCOSIS: ICD-10-CM

## 2025-05-05 DIAGNOSIS — M79.672 FOOT PAIN, BILATERAL: ICD-10-CM

## 2025-05-05 PROCEDURE — 11721 DEBRIDE NAIL 6 OR MORE: CPT | Performed by: PODIATRIST

## 2025-05-05 PROCEDURE — 1160F RVW MEDS BY RX/DR IN RCRD: CPT | Performed by: PODIATRIST

## 2025-05-05 PROCEDURE — 1159F MED LIST DOCD IN RCRD: CPT | Performed by: PODIATRIST

## 2025-05-05 PROCEDURE — 3075F SYST BP GE 130 - 139MM HG: CPT | Performed by: PODIATRIST

## 2025-05-05 PROCEDURE — 3078F DIAST BP <80 MM HG: CPT | Performed by: PODIATRIST

## 2025-05-05 NOTE — PROGRESS NOTES
[facility] - PODIATRY    Today's Date: 05/05/25    Patient Name: Anita Estrada  MRN: [id number]  CSN: [id number]  PCP: Maris Thurman APRN, Last PCP Visit: 17 March 2025  Referring Provider: No ref. provider found    SUBJECTIVE     Chief Complaint   Patient presents with    Left Foot - Follow-up, Nail Problem    Right Foot - Follow-up, Nail Problem     HPI: Anita Estrada, a 64 y.o.female, comes to clinic.    New, Established, New Problem:  established   Location:  Toenails  Duration:   Greater than five years  Onset:  Gradual  Nature:  sore with palpation.  Stable, worsening, improving:   stable  Aggravating factors:  Pain with shoe gear and ambulation.  Previous Treatment:  debridement    Medical changes: None    Patient denies any fevers, chills, nausea, vomiting, shortness of breath, nor any other constitutional signs nor symptoms.       I have reviewed/confirmed previously documented HPI with no changes.     Past Medical History:   Diagnosis Date    Acid reflux     Arthritis     Asthma     Carpal tunnel syndrome     Claustrophobia     Eczema     Essential hypertension 03/02/2015    Foot pain, bilateral     History of transfusion     1976,1983    Hypertension     Ingrowing nail 08/08/2018    Limb swelling     Lumbar back pain     Migraine headache     Obesity     Plantar fascial fibromatosis of right foot 10/11/2018    Pressure ulcer, stage 1     Sleep apnea with use of continuous positive airway pressure (CPAP)     Tinea unguium      Past Surgical History:   Procedure Laterality Date    BREAST BIOPSY      BREAST LUMPECTOMY Right 2011    BREAST LUMPECTOMY      CARPAL TUNNEL RELEASE Right 03/06/2015, 9/15/17    DILATATION AND CURETTAGE  1976    ENDOSCOPY N/A 03/19/2024    Procedure: ESOPHAGOGASTRODUODENOSCOPY WITH BIOPSY;  Surgeon: Carmine Garcia Jr., MD;  Location: [facility] ENDOSCOPY;  Service: General;  Laterality: N/A;  PRE: DYSPEPSIA /  POST: GASTRITIS    HYSTERECTOMY  1983     SHOULDER ARTHROSCOPY Left      Family History   Problem Relation Age of Onset    Arthritis Mother         Family history of certain chronic disabling diseases    Osteoporosis Mother     Cancer Father         unspecified    Stroke Other         not specified    Diabetes Other         unspecified type    Malig Hyperthermia Neg Hx      Social History     Socioeconomic History    Marital status:    Tobacco Use    Smoking status: Former     Current packs/day: 0.50     Average packs/day: 0.5 packs/day for 38.3 years (19.2 ttl pk-yrs)     Types: Cigarettes     Start date: 1974     Quit date: 2008    Smokeless tobacco: Never   Vaping Use    Vaping status: Former    Substances: Nicotine, Flavoring    Devices: Disposable   Substance and Sexual Activity    Alcohol use: Yes     Comment: social    Drug use: Never    Sexual activity: Yes     Partners: Male     Birth control/protection: Hysterectomy     No Known Allergies  Current Outpatient Medications   Medication Sig Dispense Refill    acetaminophen (TYLENOL) 650 MG 8 hr tablet Take 1 tablet by mouth Every 8 (Eight) Hours As Needed for Mild Pain.      Diclofenac Sodium (VOLTAREN) 1 % gel gel APPLY TOPICALLY FOUR TIMES A DAY AS DIRECTED 100 g 3    fluticasone (FLONASE) 50 MCG/ACT nasal spray INSTILL 1 SPRAY IN EACH NOSTRIL EVERY DAY AS DIRECTED 16 g 0    gabapentin (NEURONTIN) 800 MG tablet Take 1 tablet by mouth 4 (Four) Times a Day. 120 tablet 2    hydroCHLOROthiazide 12.5 MG tablet Take 1 tablet by mouth Daily. 90 tablet 3    ibuprofen (ADVIL,MOTRIN) 800 MG tablet TAKE 1 TABLET BY MOUTH EVERY 12 HOURS AS NEEDED FOR MILD PAIN 60 tablet 0    LORazepam (Ativan) 1 MG tablet Take 1 tablet by mouth Every 8 (Eight) Hours As Needed for Anxiety. 6 tablet 0    losartan (Cozaar) 25 MG tablet Take 1 tablet by mouth Daily. 90 tablet 3    vitamin D (ERGOCALCIFEROL) 1.25 MG (30098 UT) capsule capsule Take 1 capsule by mouth 1 (One) Time Per Week. 15 capsule 1     No current  facility-administered medications for this visit.     Review of Systems   Constitutional: Negative.    Skin:         Painful toenails.   All other systems reviewed and are negative.      OBJECTIVE     Vitals:    25 0936   BP: 139/65   Pulse: 70   SpO2: 98%       Patient seen in no apparent distress.      PHYSICAL EXAM:     Foot/Ankle Exam    GENERAL  Appearance:  obese  Orientation:  AAOx3  Affect:  appropriate  Gait:  unimpaired  Assistance:  independent  Right shoe gear: casual shoe  Left shoe gear: casual shoe    VASCULAR     Right Foot Vascularity   Normal vascular exam    Dorsalis pedis:  2+  Posterior tibial:  2+  Skin temperature:  warm  Edema grading:  None  CFT:  < 3 seconds  Pedal hair growth:  Present  Varicosities:  none     Left Foot Vascularity   Normal vascular exam    Dorsalis pedis:  2+  Posterior tibial:  2+  Skin temperature:  warm  Edema gradin+ and non-pitting  CFT:  < 3 seconds  Pedal hair growth:  Present  Varicosities:  none     NEUROLOGIC     Right Foot Neurologic   Normal sensation    Light touch sensation: normal  Vibratory sensation: normal  Hot/Cold sensation: normal  Protective Sensation using [place] Monofilament:   Sites intact: 10  Sites tested: 10     Left Foot Neurologic   Normal sensation    Light touch sensation: normal  Vibratory sensation: normal  Hot/Cold sensation:  normal  Protective Sensation using [place] Monofilament:   Sites intact: 10  Sites tested: 10    MUSCLE STRENGTH     Right Foot Muscle Strength   Foot dorsiflexion:  4  Foot plantar flexion:  4  Foot inversion:  4  Foot eversion:  4     Left Foot Muscle Strength   Foot dorsiflexion:  4  Foot plantar flexion:  4  Foot inversion:  4  Foot eversion:  4    RANGE OF MOTION     Right Foot Range of Motion   Foot and ankle ROM within normal limits       Left Foot Range of Motion   Foot and ankle ROM within normal limits      DERMATOLOGIC      Right Foot Dermatologic   Skin  Right foot skin is  intact.   Nails  1.  Positive for elongated, onychomycosis, abnormal thickness, subungual debris and ingrown toenail.  2.  Positive for elongated, onychomycosis, abnormal thickness, subungual debris and ingrown toenail.  3.  Positive for elongated, onychomycosis, abnormal thickness, subungual debris and ingrown toenail.  4.  Positive for elongated, onychomycosis, abnormal thickness, subungual debris and ingrown toenail.     Left Foot Dermatologic   Skin  Left foot skin is intact.   Nails  1.  Positive for elongated, onychomycosis, abnormal thickness, subungual debris and ingrown toenail.  2.  Positive for elongated, onychomycosis, abnormal thickness, subungual debris and ingrown toenail.  3.  Positive for elongated, onychomycosis, abnormal thickness, subungual debris and ingrown toenail.  4.  Positive for elongated, onychomycosis, abnormally thick, subungual debris and ingrown toenail.  5.  Positive for elongated, onychomycosis, abnormally thick, subungual debris and ingrown toenail.    I have reexamined the patient the results are consistent with the previously documented exam.    ASSESSMENT/PLAN     Diagnoses and all orders for this visit:    1. Onychocryptosis (Primary)    2. Foot pain, bilateral    3. Onychomycosis    4. Difficulty walking    Comprehensive lower extremity examination and evaluation was performed.    Discussed findings and treatment plan including risks, benefits, and treatment options with patient in detail. Patient agreed with treatment plan.    Toenails 1 through 5 on left and toenails 1 through 4 on right were debrided in thickness and length and then smoothed with a Dremel Tool.  Tolerated the procedure well without complications.    An After Visit Summary was printed and given to the patient at discharge, including (if requested) any available informative/educational handouts regarding diagnosis, treatment, or medications. All questions were answered to patient/family satisfaction. Should  symptoms fail to improve or worsen they agree to call or return to clinic or to go to the Emergency Department. Discussed the importance of following up with any needed screening tests/labs/specialist appointments and any requested follow-up recommended by me today. Importance of maintaining follow-up discussed and patient accepts that missed appointments can delay diagnosis and potentially lead to worsening of conditions.    Return in about 9 weeks (around 7/7/2025) for Toenail Care., or sooner if acute issues arise.    I have reviewed the assessment and plan and verified the accuracy of it. No changes to assessment and plan since the information was documented. Ranulfo Huizar DPM 05/05/25     I have dictated this note utilizing Dragon Dictation.  Please note that portions of this note were completed with a voice recognition program.  Part of this note may be an electronic transcription/translation of spoken language to printed text using the Dragon Dictation System.      This document has been electronically signed by Ranulfo Huizar DPM on May 5, 2025 09:57 EDT

## 2025-05-21 ENCOUNTER — PREP FOR SURGERY (OUTPATIENT)
Dept: OTHER | Facility: HOSPITAL | Age: 65
End: 2025-05-21
Payer: MEDICARE

## 2025-05-21 ENCOUNTER — OFFICE VISIT (OUTPATIENT)
Dept: SURGERY | Facility: CLINIC | Age: 65
End: 2025-05-21
Payer: MEDICARE

## 2025-05-21 VITALS
WEIGHT: 293 LBS | DIASTOLIC BLOOD PRESSURE: 94 MMHG | HEART RATE: 79 BPM | HEIGHT: 65 IN | SYSTOLIC BLOOD PRESSURE: 138 MMHG | BODY MASS INDEX: 48.82 KG/M2 | OXYGEN SATURATION: 99 %

## 2025-05-21 DIAGNOSIS — Z72.0 TOBACCO USE: ICD-10-CM

## 2025-05-21 DIAGNOSIS — Z12.11 ENCOUNTER FOR SCREENING FOR MALIGNANT NEOPLASM OF COLON: Primary | ICD-10-CM

## 2025-05-21 DIAGNOSIS — Z86.0100 HISTORY OF COLONIC POLYPS: ICD-10-CM

## 2025-05-21 DIAGNOSIS — Z12.11 SCREENING FOR MALIGNANT NEOPLASM OF COLON: Primary | ICD-10-CM

## 2025-05-21 RX ORDER — SODIUM CHLORIDE 0.9 % (FLUSH) 0.9 %
3 SYRINGE (ML) INJECTION EVERY 12 HOURS SCHEDULED
OUTPATIENT
Start: 2025-05-21

## 2025-05-21 RX ORDER — POLYETHYLENE GLYCOL 3350 17 G/17G
POWDER, FOR SOLUTION ORAL
Qty: 238 PACKET | Refills: 0 | Status: SHIPPED | OUTPATIENT
Start: 2025-05-21

## 2025-05-21 RX ORDER — SODIUM CHLORIDE 0.9 % (FLUSH) 0.9 %
10 SYRINGE (ML) INJECTION AS NEEDED
OUTPATIENT
Start: 2025-05-21

## 2025-05-21 RX ORDER — SODIUM CHLORIDE 9 MG/ML
40 INJECTION, SOLUTION INTRAVENOUS AS NEEDED
OUTPATIENT
Start: 2025-05-21

## 2025-05-21 NOTE — PROGRESS NOTES
Chief Complaint: Colonoscopy    Subjective      Colonoscopy consultation       History of Present Illness  Anita Estrada is a 64 y.o. female presents to [facility] for colonoscopy consultation.    Patient presents today without complaints for screening colonoscopy.  She denies any abdominal pain, change in bowel habit, or rectal bleeding.  Denies any family history of colorectal cancer.  Admits to history of colonic polyps.    Admits to EVAN.  Does use a CPAP.    Denies any cardiac issues.  Denies taking any GLP-1 receptors.    1/20: Colonoscopy (Nanda): Hepatic flexure- tubular adenoma.     Objective     Past Medical History:   Diagnosis Date    Acid reflux     Arthritis     Asthma     Carpal tunnel syndrome     Claustrophobia     Eczema     Essential hypertension 03/02/2015    Foot pain, bilateral     History of transfusion     1976,1983    Hypertension     Ingrowing nail 08/08/2018    Limb swelling     Lumbar back pain     Migraine headache     Obesity     Plantar fascial fibromatosis of right foot 10/11/2018    Pressure ulcer, stage 1     Sleep apnea with use of continuous positive airway pressure (CPAP)     Tinea unguium        Past Surgical History:   Procedure Laterality Date    BREAST BIOPSY      BREAST LUMPECTOMY Right 2011    BREAST LUMPECTOMY      CARPAL TUNNEL RELEASE Right 03/06/2015, 9/15/17    DILATATION AND CURETTAGE  1976    ENDOSCOPY N/A 03/19/2024    Procedure: ESOPHAGOGASTRODUODENOSCOPY WITH BIOPSY;  Surgeon: Carmine Garcia Jr., MD;  Location: [facility];  Service: General;  Laterality: N/A;  PRE: DYSPEPSIA /  POST: GASTRITIS    HYSTERECTOMY  1983    SHOULDER ARTHROSCOPY Left        Outpatient Medications Marked as Taking for the 5/21/25 encounter (Office Visit) with Allen April, APRN   Medication Sig Dispense Refill    acetaminophen (TYLENOL) 650 MG 8 hr tablet Take 1 tablet by mouth Every 8 (Eight) Hours As Needed for Mild Pain.      Diclofenac  Sodium (VOLTAREN) 1 % gel gel APPLY TOPICALLY FOUR TIMES A DAY AS DIRECTED 100 g 3    fluticasone (FLONASE) 50 MCG/ACT nasal spray INSTILL 1 SPRAY IN EACH NOSTRIL EVERY DAY AS DIRECTED 16 g 0    gabapentin (NEURONTIN) 800 MG tablet Take 1 tablet by mouth 4 (Four) Times a Day. 120 tablet 2    hydroCHLOROthiazide 12.5 MG tablet Take 1 tablet by mouth Daily. 90 tablet 3    ibuprofen (ADVIL,MOTRIN) 800 MG tablet TAKE 1 TABLET BY MOUTH EVERY 12 HOURS AS NEEDED FOR MILD PAIN 60 tablet 0    LORazepam (Ativan) 1 MG tablet Take 1 tablet by mouth Every 8 (Eight) Hours As Needed for Anxiety. 6 tablet 0    losartan (Cozaar) 25 MG tablet Take 1 tablet by mouth Daily. 90 tablet 3    vitamin D (ERGOCALCIFEROL) 1.25 MG (81937 UT) capsule capsule Take 1 capsule by mouth 1 (One) Time Per Week. 15 capsule 1       No Known Allergies     Family History   Problem Relation Age of Onset    Arthritis Mother         Family history of certain chronic disabling diseases    Osteoporosis Mother     Cancer Father         unspecified    Stroke Other         not specified    Diabetes Other         unspecified type    Malig Hyperthermia Neg Hx        Social History     Socioeconomic History    Marital status:    Tobacco Use    Smoking status: Some Days     Current packs/day: 0.50     Average packs/day: 0.5 packs/day for 38.4 years (19.2 ttl pk-yrs)     Types: Cigarettes     Start date: 1974     Last attempt to quit: 2008    Smokeless tobacco: Never   Vaping Use    Vaping status: Former    Substances: Nicotine, Flavoring    Devices: Disposable   Substance and Sexual Activity    Alcohol use: Yes     Comment: social    Drug use: Never    Sexual activity: Yes     Partners: Male     Birth control/protection: Hysterectomy       Review of Systems   Constitutional:  Negative for chills and fever.   Gastrointestinal:  Negative for abdominal distention, abdominal pain, anal bleeding, blood in stool, constipation, diarrhea and rectal pain.        Vital  "Signs:   /94 (BP Location: Left arm, Patient Position: Sitting, Cuff Size: Adult)   Pulse 79   Ht 165.1 cm (65\")   Wt (!) 139 kg (307 lb)   SpO2 99%   BMI 51.09 kg/m²      Physical Exam  Vitals and nursing note reviewed.   Constitutional:       General: She is not in acute distress.     Appearance: She is obese. She is not ill-appearing.   HENT:      Head: Normocephalic and atraumatic.   Cardiovascular:      Rate and Rhythm: Normal rate.   Pulmonary:      Effort: Pulmonary effort is normal.      Breath sounds: No stridor.   Abdominal:      Palpations: Abdomen is soft.      Tenderness: There is no guarding.   Musculoskeletal:         General: No deformity. Normal range of motion.   Skin:     General: Skin is warm and dry.      Coloration: Skin is not jaundiced.   Neurological:      General: No focal deficit present.      Mental Status: She is alert and oriented to person, place, and time.   Psychiatric:         Mood and Affect: Mood normal.         Thought Content: Thought content normal.          Result Review :          []  Laboratory  []  Radiology  []  Pathology  []  Microbiology  []  EKG/Telemetry   []  Cardiology/Vascular   []  Old records  I spent 15 minutes caring for Anita on this date of service. This time includes time spent by me in the following activities: reviewing tests, obtaining and/or reviewing a separately obtained history, performing a medically appropriate examination and/or evaluation, ordering medications, tests, or procedures, and documenting information in the medical record        Assessment and Plan    Diagnoses and all orders for this visit:    1. Encounter for screening for malignant neoplasm of colon (Primary)    2. History of colonic polyps    Other orders  -     polyethylene glycol (MIRALAX) 17 g packet; Take as directed.  Instructions given in office.  Dispense: 238 g bottle  Dispense: 238 packet; Refill: 0        Follow Up   Return for Schedule colonoscopy with Dr." Nanda on 11/6/2025 at [facility].    Hospital arrival time: 12:00.    Possible risks/complications, benefits, and alternatives to surgical or invasive procedures have been explained to patient and/or legal guardian.    Patient has been evaluated and can tolerate anesthesia and/or sedation. Risks, benefits, and alternatives to anesthesia and sedation have been explained to the patient and/or legal guardian. Patient verbalizes understanding and is willing to proceed with the above plan.     Patient was given instructions and counseling regarding her condition or for health maintenance advice. Please see specific information pulled into the AVS if appropriate.     As always, it has been a pleasure to participate in your patient's care. Please call with questions or concerns.

## 2025-06-09 RX ORDER — POLYETHYLENE GLYCOL 3350 17 G/17G
POWDER, FOR SOLUTION ORAL SEE ADMIN INSTRUCTIONS
Qty: 238 G | Refills: 0 | Status: SHIPPED | OUTPATIENT
Start: 2025-06-09

## 2025-06-12 ENCOUNTER — TELEPHONE (OUTPATIENT)
Dept: ORTHOPEDIC SURGERY | Facility: CLINIC | Age: 65
End: 2025-06-12
Payer: MEDICARE

## 2025-06-12 NOTE — TELEPHONE ENCOUNTER
Provider: BROOK    Caller: Anita Estrada    Relationship to Patient: Self    Pharmacy:     Phone Number: [phone]    Reason for Call: PT CALLED AND STATED THAT SHE WANTS A GEL INJ IN THE LEFT KNEE - AST GEL 11/2024

## 2025-06-12 NOTE — TELEPHONE ENCOUNTER
Will work on getting injection approved. Will call the patient after insurance approves injection.

## 2025-06-18 ENCOUNTER — APPOINTMENT (OUTPATIENT)
Dept: GENERAL RADIOLOGY | Facility: HOSPITAL | Age: 65
End: 2025-06-18
Payer: MEDICARE

## 2025-06-18 ENCOUNTER — APPOINTMENT (OUTPATIENT)
Dept: CT IMAGING | Facility: HOSPITAL | Age: 65
End: 2025-06-18
Payer: MEDICARE

## 2025-06-18 ENCOUNTER — HOSPITAL ENCOUNTER (EMERGENCY)
Facility: HOSPITAL | Age: 65
Discharge: HOME | End: 2025-06-18
Attending: EMERGENCY MEDICINE | Admitting: EMERGENCY MEDICINE
Payer: MEDICARE

## 2025-06-18 VITALS
DIASTOLIC BLOOD PRESSURE: 87 MMHG | SYSTOLIC BLOOD PRESSURE: 106 MMHG | HEIGHT: 64 IN | WEIGHT: 293 LBS | HEART RATE: 79 BPM | OXYGEN SATURATION: 99 % | TEMPERATURE: 98.5 F | BODY MASS INDEX: 50.02 KG/M2 | RESPIRATION RATE: 18 BRPM

## 2025-06-18 DIAGNOSIS — M25.511 NONTRAUMATIC SHOULDER PAIN, RIGHT: ICD-10-CM

## 2025-06-18 DIAGNOSIS — M54.2 NECK PAIN, ACUTE: Primary | ICD-10-CM

## 2025-06-18 DIAGNOSIS — M54.2 MUSCULOSKELETAL NECK PAIN: ICD-10-CM

## 2025-06-18 LAB
ALBUMIN SERPL-MCNC: 3.8 G/DL (ref 3.5–5.2)
ALBUMIN/GLOB SERPL: 1 G/DL
ALP SERPL-CCNC: 66 U/L (ref 39–117)
ALT SERPL W P-5'-P-CCNC: 15 U/L (ref 1–33)
ANION GAP SERPL CALCULATED.3IONS-SCNC: 9.5 MMOL/L (ref 5–15)
AST SERPL-CCNC: 15 U/L (ref 1–32)
BASOPHILS # BLD AUTO: 0.04 10*3/MM3 (ref 0–0.2)
BASOPHILS NFR BLD AUTO: 0.6 % (ref 0–1.5)
BILIRUB SERPL-MCNC: 0.2 MG/DL (ref 0–1.2)
BUN SERPL-MCNC: 11.3 MG/DL (ref 8–23)
BUN/CREAT SERPL: 11 (ref 7–25)
CALCIUM SPEC-SCNC: 8.8 MG/DL (ref 8.6–10.5)
CHLORIDE SERPL-SCNC: 103 MMOL/L (ref 98–107)
CO2 SERPL-SCNC: 26.5 MMOL/L (ref 22–29)
CREAT SERPL-MCNC: 1.03 MG/DL (ref 0.57–1)
DEPRECATED RDW RBC AUTO: 43.9 FL (ref 37–54)
EGFRCR SERPLBLD CKD-EPI 2021: 60.1 ML/MIN/1.73
EOSINOPHIL # BLD AUTO: 0.07 10*3/MM3 (ref 0–0.4)
EOSINOPHIL NFR BLD AUTO: 1 % (ref 0.3–6.2)
ERYTHROCYTE [DISTWIDTH] IN BLOOD BY AUTOMATED COUNT: 12.7 % (ref 12.3–15.4)
GLOBULIN UR ELPH-MCNC: 3.9 GM/DL
GLUCOSE SERPL-MCNC: 99 MG/DL (ref 65–99)
HCT VFR BLD AUTO: 41 % (ref 34–46.6)
HGB BLD-MCNC: 13.4 G/DL (ref 12–15.9)
HOLD SPECIMEN: NORMAL
HOLD SPECIMEN: NORMAL
IMM GRANULOCYTES # BLD AUTO: 0.02 10*3/MM3 (ref 0–0.05)
IMM GRANULOCYTES NFR BLD AUTO: 0.3 % (ref 0–0.5)
LYMPHOCYTES # BLD AUTO: 2.25 10*3/MM3 (ref 0.7–3.1)
LYMPHOCYTES NFR BLD AUTO: 32.2 % (ref 19.6–45.3)
MCH RBC QN AUTO: 30.7 PG (ref 26.6–33)
MCHC RBC AUTO-ENTMCNC: 32.7 G/DL (ref 31.5–35.7)
MCV RBC AUTO: 94 FL (ref 79–97)
MONOCYTES # BLD AUTO: 0.57 10*3/MM3 (ref 0.1–0.9)
MONOCYTES NFR BLD AUTO: 8.2 % (ref 5–12)
NEUTROPHILS NFR BLD AUTO: 4.04 10*3/MM3 (ref 1.7–7)
NEUTROPHILS NFR BLD AUTO: 57.7 % (ref 42.7–76)
NRBC BLD AUTO-RTO: 0 /100 WBC (ref 0–0.2)
PLATELET # BLD AUTO: 253 10*3/MM3 (ref 140–450)
PMV BLD AUTO: 10.2 FL (ref 6–12)
POTASSIUM SERPL-SCNC: 4 MMOL/L (ref 3.5–5.2)
PROT SERPL-MCNC: 7.7 G/DL (ref 6–8.5)
QT INTERVAL: 394 MS
QTC INTERVAL: 445 MS
RBC # BLD AUTO: 4.36 10*6/MM3 (ref 3.77–5.28)
SODIUM SERPL-SCNC: 139 MMOL/L (ref 136–145)
WBC NRBC COR # BLD AUTO: 6.99 10*3/MM3 (ref 3.4–10.8)
WHOLE BLOOD HOLD COAG: NORMAL

## 2025-06-18 PROCEDURE — 25510000001 IOPAMIDOL PER 1 ML: Performed by: EMERGENCY MEDICINE

## 2025-06-18 PROCEDURE — 85025 COMPLETE CBC W/AUTO DIFF WBC: CPT | Performed by: EMERGENCY MEDICINE

## 2025-06-18 PROCEDURE — 72050 X-RAY EXAM NECK SPINE 4/5VWS: CPT

## 2025-06-18 PROCEDURE — 70496 CT ANGIOGRAPHY HEAD: CPT

## 2025-06-18 PROCEDURE — 25010000002 ORPHENADRINE CITRATE PER 60 MG

## 2025-06-18 PROCEDURE — 80053 COMPREHEN METABOLIC PANEL: CPT | Performed by: EMERGENCY MEDICINE

## 2025-06-18 PROCEDURE — 99285 EMERGENCY DEPT VISIT HI MDM: CPT

## 2025-06-18 PROCEDURE — 70498 CT ANGIOGRAPHY NECK: CPT

## 2025-06-18 PROCEDURE — 71045 X-RAY EXAM CHEST 1 VIEW: CPT

## 2025-06-18 PROCEDURE — 93005 ELECTROCARDIOGRAM TRACING: CPT | Performed by: EMERGENCY MEDICINE

## 2025-06-18 PROCEDURE — 96374 THER/PROPH/DIAG INJ IV PUSH: CPT

## 2025-06-18 PROCEDURE — 63710000001 PREDNISONE PER 5 MG

## 2025-06-18 PROCEDURE — 93005 ELECTROCARDIOGRAM TRACING: CPT

## 2025-06-18 RX ORDER — CELECOXIB 100 MG/1
100 CAPSULE ORAL ONCE
Status: COMPLETED | OUTPATIENT
Start: 2025-06-18 | End: 2025-06-18

## 2025-06-18 RX ORDER — IOPAMIDOL 755 MG/ML
100 INJECTION, SOLUTION INTRAVASCULAR
Status: COMPLETED | OUTPATIENT
Start: 2025-06-18 | End: 2025-06-18

## 2025-06-18 RX ORDER — PREDNISONE 20 MG/1
TABLET ORAL
Qty: 18 TABLET | Refills: 0 | Status: SHIPPED | OUTPATIENT
Start: 2025-06-18 | End: 2025-06-27

## 2025-06-18 RX ORDER — ORPHENADRINE CITRATE 30 MG/ML
60 INJECTION INTRAMUSCULAR; INTRAVENOUS ONCE
Status: COMPLETED | OUTPATIENT
Start: 2025-06-18 | End: 2025-06-18

## 2025-06-18 RX ORDER — SODIUM CHLORIDE 0.9 % (FLUSH) 0.9 %
10 SYRINGE (ML) INJECTION AS NEEDED
Status: DISCONTINUED | OUTPATIENT
Start: 2025-06-18 | End: 2025-06-18

## 2025-06-18 RX ORDER — CELECOXIB 100 MG/1
100 CAPSULE ORAL 2 TIMES DAILY
Qty: 14 CAPSULE | Refills: 0 | Status: SHIPPED | OUTPATIENT
Start: 2025-06-18 | End: 2025-06-25

## 2025-06-18 RX ORDER — ASPIRIN 81 MG/1
324 TABLET, CHEWABLE ORAL ONCE
Status: DISCONTINUED | OUTPATIENT
Start: 2025-06-18 | End: 2025-06-18

## 2025-06-18 RX ORDER — PREDNISONE 10 MG/1
40 TABLET ORAL ONCE
Status: COMPLETED | OUTPATIENT
Start: 2025-06-18 | End: 2025-06-18

## 2025-06-18 RX ORDER — ORPHENADRINE CITRATE 100 MG/1
100 TABLET ORAL 2 TIMES DAILY
Qty: 21 TABLET | Refills: 0 | Status: SHIPPED | OUTPATIENT
Start: 2025-06-18

## 2025-06-18 RX ADMIN — PREDNISONE 40 MG: 10 TABLET ORAL at 21:25

## 2025-06-18 RX ADMIN — CELECOXIB 100 MG: 100 CAPSULE ORAL at 21:25

## 2025-06-18 RX ADMIN — ORPHENADRINE CITRATE 60 MG: 60 INJECTION INTRAMUSCULAR; INTRAVENOUS at 18:24

## 2025-06-18 RX ADMIN — IOPAMIDOL 91 ML: 755 INJECTION, SOLUTION INTRAVENOUS at 19:27

## 2025-06-18 NOTE — ED PROVIDER NOTES
"SHARED VISIT ATTESTATION:    This visit was performed by myself and an APC.  I personally approved the management plan/medical decision making and take responsibility for the patient management.      SHARED VISIT NOTE:    Patient is 64 y.o. year old female that presents to the ED for evaluation of neck pain.     Physical Exam    ED Course:    /87   Pulse 79   Temp 98.5 °F (36.9 °C) (Oral)   Resp 18   Ht 162.6 cm (64\")   Wt (!) 138 kg (304 lb 3.8 oz)   SpO2 99%   BMI 52.22 kg/m²       The following orders were placed and all results were independently analyzed by me:  Orders Placed This Encounter   Procedures    XR Chest 1 View    XR Spine Cervical Complete 4 or 5 View    CT Angiogram Neck    CT Angiogram Head    Comprehensive Metabolic Panel    CBC Auto Differential    Undress & Gown    Continuous Pulse Oximetry    ECG 12 Lead ED Triage Standing Order; Chest Pain    CBC & Differential    Extra Tubes    Gold Top - SST    Gray Top    Light Blue Top       Medications Given in the Emergency Department:  Medications   orphenadrine (NORFLEX) injection 60 mg (60 mg Intravenous Given 6/18/25 1824)   iopamidol (ISOVUE-370) 76 % injection 100 mL (91 mL Intravenous Given 6/18/25 1927)   celecoxib (CeleBREX) capsule 100 mg (100 mg Oral Given 6/18/25 2125)   predniSONE (DELTASONE) tablet 40 mg (40 mg Oral Given 6/18/25 2125)        ED Course:    ED Course as of 06/19/25 1738   Thu Jun 19, 2025   0104 Comprehensive Metabolic Panel(!)  The patient´s CMP that was reviewed and interpretted by me shows no abnormalities of critical concern. Of note, the patient´s sodium and potassium are acceptable. The patient´s liver enzymes are unremarkable. The patient´s renal function (creatinine) is preserved. The patient has a normal anion gap. [AS]   0104 CBC & Differential  The patient´s CBC that was reviewed and interpreted by me shows no abnormalities of critical concern. Of note, there is no anemia requiring a blood " transfusion and the platelet count is acceptable. [AS]   0104 XR Spine Cervical Complete 4 or 5 View  No acute fracture or malalignment [AS]   0104 XR Chest 1 View  No acute infiltrates [AS]      ED Course User Index  [AS] Seaver, Alyce B, APRN       Labs:    Lab Results (last 24 hours)       ** No results found for the last 24 hours. **             Imaging:    CT Angiogram Neck  Result Date: 6/18/2025  CT ANGIOGRAM HEAD, CT ANGIOGRAM NECK Date of Exam: 6/18/2025 7:15 PM EDT Indication: new onset headache and neck pain. Comparison: None available. Technique: CTA of the head and neck was performed after the uneventful intravenous administration of iodinated contrast. Reconstructed coronal and sagittal images were also obtained. In addition, a 3-D volume rendered image was created for interpretation. Automated exposure control and iterative reconstruction methods were used. Findings: HEAD CTA: Anterior circulation: No proximal large vessel occlusion or major stenosis. Posterior circulation: No proximal large vessel occlusion or major stenosis. Major dural venous sinuses: No evidence of dural venous sinus thrombosis within the limitation of angiographic contrast imaging. NECK CTA: Conventional, three-vessel, aortic arch. Normal contrast enhancement in the common carotid arteries from their origins to the bifurcation. No significant stenosis of the bilateral ICAs at their origins by NASCET criteria. Normal contrast enhancement of the internal carotid arteries from their origins to the proximal intradural portions.. Normal enhancement in the vertebral arteries from their origins to their proximal intradural portions. The right vertebral artery is dominant. Patent subclavian arteries. Angiographic contrast timing precludes accurate assessement of jugular vein patency. SOFT TISSUE NECK: No exophytic lesion seen within the aerodigestive tract. No pathologic appearing lymph nodes by imaging criteria. The visualized glands  appear unremarkable. No acute or aggressive appearing osseous or soft tissue process.Degenerative changes of the imaged spine. Expiratory phase imaging with scattered atelectasis in the imaged lungs.     Impression: No proximal large vessel occlusion or severe stenosis of the major arteries of the head and neck. Electronically Signed: Alexander Garcias MD  6/18/2025 9:01 PM EDT  Workstation ID: [id number]    CT Angiogram Head  Result Date: 6/18/2025  CT ANGIOGRAM HEAD, CT ANGIOGRAM NECK Date of Exam: 6/18/2025 7:15 PM EDT Indication: new onset headache and neck pain. Comparison: None available. Technique: CTA of the head and neck was performed after the uneventful intravenous administration of iodinated contrast. Reconstructed coronal and sagittal images were also obtained. In addition, a 3-D volume rendered image was created for interpretation. Automated exposure control and iterative reconstruction methods were used. Findings: HEAD CTA: Anterior circulation: No proximal large vessel occlusion or major stenosis. Posterior circulation: No proximal large vessel occlusion or major stenosis. Major dural venous sinuses: No evidence of dural venous sinus thrombosis within the limitation of angiographic contrast imaging. NECK CTA: Conventional, three-vessel, aortic arch. Normal contrast enhancement in the common carotid arteries from their origins to the bifurcation. No significant stenosis of the bilateral ICAs at their origins by NASCET criteria. Normal contrast enhancement of the internal carotid arteries from their origins to the proximal intradural portions.. Normal enhancement in the vertebral arteries from their origins to their proximal intradural portions. The right vertebral artery is dominant. Patent subclavian arteries. Angiographic contrast timing precludes accurate assessement of jugular vein patency. SOFT TISSUE NECK: No exophytic lesion seen within the aerodigestive tract. No pathologic appearing lymph nodes  by imaging criteria. The visualized glands appear unremarkable. No acute or aggressive appearing osseous or soft tissue process.Degenerative changes of the imaged spine. Expiratory phase imaging with scattered atelectasis in the imaged lungs.     Impression: No proximal large vessel occlusion or severe stenosis of the major arteries of the head and neck. Electronically Signed: Alexander Garcias MD  6/18/2025 9:01 PM EDT  Workstation ID: [id number]    XR Spine Cervical Complete 4 or 5 View  Result Date: 6/18/2025  XR SPINE CERVICAL COMPLETE 4 OR 5 VW Date of Exam: 6/18/2025 6:07 PM EDT Indication: new onset pain Comparison: None available. Findings: There are 7 cervical type vertebral bodies. Straightening of the normal cervical lordosis, which may be positional.. No evidence of fracture or compression deformity. Multilevel degenerative changes including disc height loss, osteophytes, mild facet arthropathy, and uncovertebral hypertrophy. The prevertebral soft tissues appear within normal limits. Included lung apices are clear.     Impression: No radiographic evidence of acute fracture or compression deformity. Electronically Signed: Alexander Garcias MD  6/18/2025 6:32 PM EDT  Workstation ID: [id number]      MDM:    Procedures    Labs were collected in the emergency department and all labs were reviewed and interpreted by me.  X-ray were performed in the emergency department and all X-ray impressions were independently interpreted by me.                     Juan F Baker MD  17:38 EDT  06/19/25         Juan F Baker MD  06/19/25 1738

## 2025-06-18 NOTE — ED PROVIDER NOTES
Time: 5:19 PM EDT  Date of encounter:  6/18/2025  Independent Historian/Clinical History and Information was obtained by:   Patient    History is limited by: N/A    Chief Complaint: Neck and shoulder pain      History of Present Illness:  Patient is a 64 y.o. year old female who presents to the emergency department for evaluation of neck and shoulder pain.  Patient states that has been on and off for the past week.  Patient states that the pain is radiating from the back of her neck up into the back of her head.  Patient states she does have pain in her right arm but denies any numbness or tingling.  Patient states she does have blurry vision and just had her eyes checked about 2 months ago.  Patient has been nauseous but thinks this may be due to the Tylenol.  Patient denies any recent injuries.      Patient Care Team  Primary Care Provider: Maris Thurman APRN    Past Medical History:     No Known Allergies  Past Medical History:   Diagnosis Date    Acid reflux     Arthritis     Asthma     Carpal tunnel syndrome     Claustrophobia     Eczema     Essential hypertension 03/02/2015    Foot pain, bilateral     History of transfusion     1976,1983    Hypertension     Ingrowing nail 08/08/2018    Limb swelling     Lumbar back pain     Migraine headache     Obesity     Plantar fascial fibromatosis of right foot 10/11/2018    Pressure ulcer, stage 1     Sleep apnea with use of continuous positive airway pressure (CPAP)     Tinea unguium      Past Surgical History:   Procedure Laterality Date    BREAST BIOPSY      BREAST LUMPECTOMY Right 2011    BREAST LUMPECTOMY      CARPAL TUNNEL RELEASE Right 03/06/2015, 9/15/17    DILATATION AND CURETTAGE  1976    ENDOSCOPY N/A 03/19/2024    Procedure: ESOPHAGOGASTRODUODENOSCOPY WITH BIOPSY;  Surgeon: Carmine Garcia Jr., MD;  Location: [facility] ENDOSCOPY;  Service: General;  Laterality: N/A;  PRE: DYSPEPSIA /  POST: GASTRITIS    HYSTERECTOMY  1983    SHOULDER ARTHROSCOPY Left       Family History   Problem Relation Age of Onset    Arthritis Mother         Family history of certain chronic disabling diseases    Osteoporosis Mother     Cancer Father         unspecified    Stroke Other         not specified    Diabetes Other         unspecified type    Malig Hyperthermia Neg Hx        Home Medications:  Prior to Admission medications    Medication Sig Start Date End Date Taking? Authorizing Provider   acetaminophen (TYLENOL) 650 MG 8 hr tablet Take 1 tablet by mouth Every 8 (Eight) Hours As Needed for Mild Pain.    Provider, MD Bravo   Diclofenac Sodium (VOLTAREN) 1 % gel gel APPLY TOPICALLY FOUR TIMES A DAY AS DIRECTED 6/22/23   Chad Todd MD   fluticasone (FLONASE) 50 MCG/ACT nasal spray INSTILL 1 SPRAY IN EACH NOSTRIL EVERY DAY AS DIRECTED 4/21/25   Jackeline Galvez APRN   gabapentin (NEURONTIN) 800 MG tablet Take 1 tablet by mouth 4 (Four) Times a Day. 1/10/24   Maris Thurman APRN   hydroCHLOROthiazide 12.5 MG tablet Take 1 tablet by mouth Daily. 8/26/24   Marsi Thurman APRN   ibuprofen (ADVIL,MOTRIN) 800 MG tablet TAKE 1 TABLET BY MOUTH EVERY 12 HOURS AS NEEDED FOR MILD PAIN 9/18/24   Maris Thurman APRN   LORazepam (Ativan) 1 MG tablet Take 1 tablet by mouth Every 8 (Eight) Hours As Needed for Anxiety. 3/17/25   Jyoti Huertas APRN   losartan (Cozaar) 25 MG tablet Take 1 tablet by mouth Daily. 8/26/24   Maris Thurman APRN   polyethylene glycol (MIRALAX) 17 GM/SCOOP powder USE AS DIRECTED 6/9/25   Trniidad Villa APRN   vitamin D (ERGOCALCIFEROL) 1.25 MG (86237 UT) capsule capsule Take 1 capsule by mouth 1 (One) Time Per Week. 3/25/25   Maris Thurman APRN        Social History:   Social History     Tobacco Use    Smoking status: Some Days     Current packs/day: 0.50     Average packs/day: 0.5 packs/day for 38.5 years (19.2 ttl pk-yrs)     Types: Cigarettes     Start date: 1974     Last attempt to quit: 2008    Smokeless tobacco: Never  "  Vaping Use    Vaping status: Former    Substances: Nicotine, Flavoring    Devices: Disposable   Substance Use Topics    Alcohol use: Yes     Comment: social    Drug use: Never         Review of Systems:  Review of Systems   Eyes:  Positive for visual disturbance.   Gastrointestinal:  Positive for nausea.   Musculoskeletal:  Positive for arthralgias, myalgias and neck pain.   Neurological:  Positive for headaches. Negative for seizures, weakness and numbness.   Psychiatric/Behavioral:  Negative for confusion.         Physical Exam:  /87   Pulse 79   Temp 98.5 °F (36.9 °C) (Oral)   Resp 18   Ht 162.6 cm (64\")   Wt (!) 138 kg (304 lb 3.8 oz)   SpO2 99%   BMI 52.22 kg/m²     Physical Exam  HENT:      Head: Normocephalic.      Mouth/Throat:      Mouth: Mucous membranes are moist.   Eyes:      Pupils: Pupils are equal, round, and reactive to light.   Pulmonary:      Effort: Pulmonary effort is normal.   Abdominal:      General: There is no distension.   Musculoskeletal:      Cervical back: Neck supple. Pain with movement and muscular tenderness present. Decreased range of motion.   Skin:     General: Skin is warm and dry.   Neurological:      General: No focal deficit present.      Mental Status: She is alert and oriented to person, place, and time.      Sensory: Sensation is intact.      Motor: Motor function is intact.   Psychiatric:         Mood and Affect: Mood normal.         Behavior: Behavior normal.                    Medical Decision Making:      Comorbidities that affect care:    Hypertension, Obesity    External Notes reviewed:    Previous Clinic Note: PCP for well check      The following orders were placed and all results were independently analyzed by me:  Orders Placed This Encounter   Procedures    XR Chest 1 View    XR Spine Cervical Complete 4 or 5 View    CT Angiogram Neck    CT Angiogram Head    Comprehensive Metabolic Panel    CBC Auto Differential    Undress & Gown    Continuous Pulse " Oximetry    ECG 12 Lead ED Triage Standing Order; Chest Pain    CBC & Differential    Extra Tubes    Gold Top - SST    Gray Top    Light Blue Top       Medications Given in the Emergency Department:  Medications   orphenadrine (NORFLEX) injection 60 mg (60 mg Intravenous Given 6/18/25 1824)   iopamidol (ISOVUE-370) 76 % injection 100 mL (91 mL Intravenous Given 6/18/25 1927)   celecoxib (CeleBREX) capsule 100 mg (100 mg Oral Given 6/18/25 2125)   predniSONE (DELTASONE) tablet 40 mg (40 mg Oral Given 6/18/25 2125)        ED Course:    ED Course as of 06/19/25 0105   Thu Jun 19, 2025   0104 Comprehensive Metabolic Panel(!)  The patient´s CMP that was reviewed and interpretted by me shows no abnormalities of critical concern. Of note, the patient´s sodium and potassium are acceptable. The patient´s liver enzymes are unremarkable. The patient´s renal function (creatinine) is preserved. The patient has a normal anion gap. [AS]   0104 CBC & Differential  The patient´s CBC that was reviewed and interpreted by me shows no abnormalities of critical concern. Of note, there is no anemia requiring a blood transfusion and the platelet count is acceptable. [AS]   0104 XR Spine Cervical Complete 4 or 5 View  No acute fracture or malalignment [AS]   0104 XR Chest 1 View  No acute infiltrates [AS]      ED Course User Index  [AS] Seaver, Alyce B, CHYNA       Labs:    Lab Results (last 24 hours)       Procedure Component Value Units Date/Time    CBC & Differential [[id number]]  (Normal) Collected: 06/18/25 1730    Specimen: Blood Updated: 06/18/25 1739    Narrative:      The following orders were created for panel order CBC & Differential.  Procedure                               Abnormality         Status                     ---------                               -----------         ------                     CBC Auto Differential[[id number]]        Normal              Final result                 Please view results for these tests on  the individual orders.    Comprehensive Metabolic Panel [[id number]]  (Abnormal) Collected: 06/18/25 1730    Specimen: Blood Updated: 06/18/25 1804     Glucose 99 mg/dL      BUN 11.3 mg/dL      Creatinine 1.03 mg/dL      Sodium 139 mmol/L      Potassium 4.0 mmol/L      Comment: Slight hemolysis detected by analyzer. Result may be falsely elevated.        Chloride 103 mmol/L      CO2 26.5 mmol/L      Calcium 8.8 mg/dL      Total Protein 7.7 g/dL      Albumin 3.8 g/dL      ALT (SGPT) 15 U/L      AST (SGOT) 15 U/L      Alkaline Phosphatase 66 U/L      Total Bilirubin 0.2 mg/dL      Globulin 3.9 gm/dL      A/G Ratio 1.0 g/dL      BUN/Creatinine Ratio 11.0     Anion Gap 9.5 mmol/L      eGFR 60.1 mL/min/1.73     Narrative:      GFR Categories in Chronic Kidney Disease (CKD)              GFR Category          GFR (mL/min/1.73)    Interpretation  G1                    90 or greater        Normal or high (1)  G2                    60-89                Mild decrease (1)  G3a                   45-59                Mild to moderate decrease  G3b                   30-44                Moderate to severe decrease  G4                    15-29                Severe decrease  G5                    14 or less           Kidney failure    (1)In the absence of evidence of kidney disease, neither GFR category G1 or G2 fulfill the criteria for CKD.    eGFR calculation 2021 CKD-EPI creatinine equation, which does not include race as a factor    CBC Auto Differential [[id number]]  (Normal) Collected: 06/18/25 1730    Specimen: Blood Updated: 06/18/25 1739     WBC 6.99 10*3/mm3      RBC 4.36 10*6/mm3      Hemoglobin 13.4 g/dL      Hematocrit 41.0 %      MCV 94.0 fL      MCH 30.7 pg      MCHC 32.7 g/dL      RDW 12.7 %      RDW-SD 43.9 fl      MPV 10.2 fL      Platelets 253 10*3/mm3      Neutrophil % 57.7 %      Lymphocyte % 32.2 %      Monocyte % 8.2 %      Eosinophil % 1.0 %      Basophil % 0.6 %      Immature Grans % 0.3 %      Neutrophils,  Absolute 4.04 10*3/mm3      Lymphocytes, Absolute 2.25 10*3/mm3      Monocytes, Absolute 0.57 10*3/mm3      Eosinophils, Absolute 0.07 10*3/mm3      Basophils, Absolute 0.04 10*3/mm3      Immature Grans, Absolute 0.02 10*3/mm3      nRBC 0.0 /100 WBC              Imaging:    CT Angiogram Neck  Result Date: 6/18/2025  CT ANGIOGRAM HEAD, CT ANGIOGRAM NECK Date of Exam: 6/18/2025 7:15 PM EDT Indication: new onset headache and neck pain. Comparison: None available. Technique: CTA of the head and neck was performed after the uneventful intravenous administration of iodinated contrast. Reconstructed coronal and sagittal images were also obtained. In addition, a 3-D volume rendered image was created for interpretation. Automated exposure control and iterative reconstruction methods were used. Findings: HEAD CTA: Anterior circulation: No proximal large vessel occlusion or major stenosis. Posterior circulation: No proximal large vessel occlusion or major stenosis. Major dural venous sinuses: No evidence of dural venous sinus thrombosis within the limitation of angiographic contrast imaging. NECK CTA: Conventional, three-vessel, aortic arch. Normal contrast enhancement in the common carotid arteries from their origins to the bifurcation. No significant stenosis of the bilateral ICAs at their origins by NASCET criteria. Normal contrast enhancement of the internal carotid arteries from their origins to the proximal intradural portions.. Normal enhancement in the vertebral arteries from their origins to their proximal intradural portions. The right vertebral artery is dominant. Patent subclavian arteries. Angiographic contrast timing precludes accurate assessement of jugular vein patency. SOFT TISSUE NECK: No exophytic lesion seen within the aerodigestive tract. No pathologic appearing lymph nodes by imaging criteria. The visualized glands appear unremarkable. No acute or aggressive appearing osseous or soft tissue  process.Degenerative changes of the imaged spine. Expiratory phase imaging with scattered atelectasis in the imaged lungs.     Impression: No proximal large vessel occlusion or severe stenosis of the major arteries of the head and neck. Electronically Signed: Alexander Garcias MD  6/18/2025 9:01 PM EDT  Workstation ID: [id number]    CT Angiogram Head  Result Date: 6/18/2025  CT ANGIOGRAM HEAD, CT ANGIOGRAM NECK Date of Exam: 6/18/2025 7:15 PM EDT Indication: new onset headache and neck pain. Comparison: None available. Technique: CTA of the head and neck was performed after the uneventful intravenous administration of iodinated contrast. Reconstructed coronal and sagittal images were also obtained. In addition, a 3-D volume rendered image was created for interpretation. Automated exposure control and iterative reconstruction methods were used. Findings: HEAD CTA: Anterior circulation: No proximal large vessel occlusion or major stenosis. Posterior circulation: No proximal large vessel occlusion or major stenosis. Major dural venous sinuses: No evidence of dural venous sinus thrombosis within the limitation of angiographic contrast imaging. NECK CTA: Conventional, three-vessel, aortic arch. Normal contrast enhancement in the common carotid arteries from their origins to the bifurcation. No significant stenosis of the bilateral ICAs at their origins by NASCET criteria. Normal contrast enhancement of the internal carotid arteries from their origins to the proximal intradural portions.. Normal enhancement in the vertebral arteries from their origins to their proximal intradural portions. The right vertebral artery is dominant. Patent subclavian arteries. Angiographic contrast timing precludes accurate assessement of jugular vein patency. SOFT TISSUE NECK: No exophytic lesion seen within the aerodigestive tract. No pathologic appearing lymph nodes by imaging criteria. The visualized glands appear unremarkable. No acute or  aggressive appearing osseous or soft tissue process.Degenerative changes of the imaged spine. Expiratory phase imaging with scattered atelectasis in the imaged lungs.     Impression: No proximal large vessel occlusion or severe stenosis of the major arteries of the head and neck. Electronically Signed: Alexander Garcias MD  6/18/2025 9:01 PM EDT  Workstation ID: [id number]    XR Spine Cervical Complete 4 or 5 View  Result Date: 6/18/2025  XR SPINE CERVICAL COMPLETE 4 OR 5 VW Date of Exam: 6/18/2025 6:07 PM EDT Indication: new onset pain Comparison: None available. Findings: There are 7 cervical type vertebral bodies. Straightening of the normal cervical lordosis, which may be positional.. No evidence of fracture or compression deformity. Multilevel degenerative changes including disc height loss, osteophytes, mild facet arthropathy, and uncovertebral hypertrophy. The prevertebral soft tissues appear within normal limits. Included lung apices are clear.     Impression: No radiographic evidence of acute fracture or compression deformity. Electronically Signed: Alexander Garcias MD  6/18/2025 6:32 PM EDT  Workstation ID: [id number]    XR Chest 1 View  Result Date: 6/18/2025  XR CHEST 1 VW Date of Exam: 6/18/2025 5:08 PM EDT Indication: Chest Pain Triage Protocol Comparison: Chest AP dated 11/7/2023 Findings: Prominent bronchovascular markings are noted in the medial right lung base. The left lung appears grossly clear. The cardiac and mediastinal silhouettes appear normal. No effusion is seen.     Impression: Prominent bronchovascular markings in the medial right lung base could be secondary to bronchitis or interstitial pneumonia. Correlate clinically. Consider follow-up chest PA and lateral in 6 weeks to confirm resolution. Electronically Signed: Venkata Ralph MD  6/18/2025 5:37 PM EDT  Workstation ID: [id number]        Differential Diagnosis and Discussion:    Neck Pain: The patient presents with neck pain. My differential  diagnosis includes but is not limited to acute spinal epidural abscess, acute spinal epidural bleed, meningitis, musculoskeletal neck pain, spinal fracture, and osteoarthritis.     PROCEDURES:    Labs were collected in the emergency department and all labs were reviewed and interpreted by me.  X-ray were performed in the emergency department and all X-ray impressions were independently interpreted by me.  An EKG was performed and the EKG was interpreted by me.  An EKG was performed and the EKG was interpreted by supervising attending.  CT scan was performed in the emergency department and the CT scan radiology impression was interpreted by me.    ECG 12 Lead ED Triage Standing Order; Chest Pain   Preliminary Result   HEART RATE=77  bpm   RR Gokqebpa=099  ms   OH Cbrsjcwq=724  ms   P Horizontal Axis=1  deg   P Front Axis=65  deg   QRSD Interval=91  ms   QT Lrubalst=341  ms   OYaY=694  ms   QRS Axis=79  deg   T Wave Axis=45  deg   - BORDERLINE ECG -   Sinus rhythm   Probable left atrial enlargement   Low voltage, precordial leads   Date and Time of Study:2025-06-18 17:07:32          Procedures    MDM     Amount and/or Complexity of Data Reviewed  Clinical lab tests: reviewed  Tests in the radiology section of CPT®: reviewed  Tests in the medicine section of CPT®: reviewed             Patient Care Considerations:    NARCOTICS: I considered prescribing opiate pain medication as an outpatient, however pain controlled with OTC meds      Consultants/Shared Management Plan:    SHARED VISIT: I have discussed the case with my supervising physician, Dr. Baker who states he agrees with plan of care. The substantive portion of the medical decision was made by the attesting physician who made or approve the management plan and will take responsibility for the patient.  Clinical findings were discussed and ultimate disposition was made in consult with supervising physician.    Social Determinants of Health:    Patient is  independent, reliable, and has access to care.       Disposition and Care Coordination:    Discharged: The patient is suitable and stable for discharge with no need for consideration of admission.    I have explained the patient´s condition, diagnoses and treatment plan based on the information available to me at this time. I have answered questions and addressed any concerns. The patient has a good  understanding of the patient´s diagnosis, condition, and treatment plan as can be expected at this point. The vital signs have been stable. The patient´s condition is stable and appropriate for discharge from the emergency department.      The patient will pursue further outpatient evaluation with the primary care physician or other designated or consulting physician as outlined in the discharge instructions. They are agreeable to this plan of care and follow-up instructions have been explained in detail. The patient has received these instructions in written format and has expressed an understanding of the discharge instructions. The patient is aware that any significant change in condition or worsening of symptoms should prompt an immediate return to this or the closest emergency department or call to 911.  I have explained discharge medications and the need for follow up with the patient/caretakers. This was also printed in the discharge instructions. Patient was discharged with the following medications and follow up:      Medication List        New Prescriptions      celecoxib 100 MG capsule  Commonly known as: CeleBREX  Take 1 capsule by mouth 2 (Two) Times a Day for 7 days.     orphenadrine 100 MG 12 hr tablet  Commonly known as: NORFLEX  Take 1 tablet by mouth 2 (Two) Times a Day.     predniSONE 20 MG tablet  Commonly known as: DELTASONE  Take 1 tablet by mouth 3 (Three) Times a Day With Meals for 3 days, THEN 1 tablet 2 (Two) Times a Day for 3 days, THEN 1 tablet Daily for 3 days.  Start taking on: June 18,  2025               Where to Get Your Medications        These medications were sent to [facility]/pharmacy #[id number] - [place], [place] - [address] - [phone] [facility] [phone]   [address] [place] [address]      Hours: 24-hours Phone: [phone]   celecoxib 100 MG capsule  orphenadrine 100 MG 12 hr tablet  predniSONE 20 MG tablet      Maris Thurman, APRN  [facility] [address] [id number] [phone]             Final diagnoses:   Neck pain, acute   Musculoskeletal neck pain   Nontraumatic shoulder pain, right        ED Disposition       ED Disposition   Discharge    Condition   Stable    Comment   --               This medical record created using voice recognition software.             Seaver, Alyce B, APRN  06/19/25 0105

## 2025-06-19 NOTE — DISCHARGE INSTRUCTIONS
Follow up with your primary care provider. Return to ER if symptoms worsen or fail to improve.  Take Celebrex 2 times daily for the next 7 days, do not take ibuprofen or Advil while also taking Celebrex however you may take Tylenol but do not exceed 4 g in 24 hours.  Take steroid, prednisone, as prescribed.  Take Norflex 2 times daily as needed for muscle spasms.

## 2025-06-20 RX ORDER — POLYETHYLENE GLYCOL 3350 17 G/17G
POWDER, FOR SOLUTION ORAL SEE ADMIN INSTRUCTIONS
Qty: 238 G | Refills: 0 | Status: SHIPPED | OUTPATIENT
Start: 2025-06-20

## 2025-06-23 ENCOUNTER — TELEPHONE (OUTPATIENT)
Dept: UROLOGY | Age: 65
End: 2025-06-23
Payer: MEDICARE

## 2025-06-23 NOTE — TELEPHONE ENCOUNTER
PATIENT CALLED AND WANTS TO KNOW HOW MANY PRESCRIPTIONS OF BOWEL PREP SHE IS DO  AND DO FOR HER COLONOSCOPY IN NOVEMBER.  SHE SAID SHE HAS ALREADY PICKED UP THE MIRALAX TWICE, AND THERE IS ANOTHER PRESCRIPTION READY FOR HER TO GET.    SHE DOESN'T KNOW IF SHE STILL HAS THE BOWEL PREP INSTRUCTIONS.  CAN WE PUT THEM ON MY CHART?    #[phone]

## 2025-06-25 ENCOUNTER — TELEPHONE (OUTPATIENT)
Dept: ORTHOPEDIC SURGERY | Facility: CLINIC | Age: 65
End: 2025-06-25
Payer: MEDICARE

## 2025-06-25 NOTE — TELEPHONE ENCOUNTER
APPT MADE: 6-26 AT 9:15 AM LT KNEE SYNVISC ONE INJ WITH DR DOE    LAST LT KNEE ZILRETTA INJ: 3-25    [facility]=NO PA REQ

## 2025-06-25 NOTE — TELEPHONE ENCOUNTER
----- Message from Leslie MEYER sent at 6/19/2025  3:21 PM EDT -----  No PA Required for Left Knee Synvisc One.  Okay to schedule when appropriate.

## 2025-06-26 ENCOUNTER — OFFICE VISIT (OUTPATIENT)
Dept: ORTHOPEDIC SURGERY | Facility: CLINIC | Age: 65
End: 2025-06-26
Payer: MEDICARE

## 2025-06-26 VITALS
DIASTOLIC BLOOD PRESSURE: 79 MMHG | WEIGHT: 293 LBS | HEART RATE: 72 BPM | HEIGHT: 64 IN | SYSTOLIC BLOOD PRESSURE: 131 MMHG | BODY MASS INDEX: 50.02 KG/M2 | OXYGEN SATURATION: 94 %

## 2025-06-26 DIAGNOSIS — M25.562 LEFT KNEE PAIN, UNSPECIFIED CHRONICITY: Primary | ICD-10-CM

## 2025-06-26 DIAGNOSIS — M17.12 PRIMARY OSTEOARTHRITIS OF LEFT KNEE: ICD-10-CM

## 2025-06-26 NOTE — PROGRESS NOTES
"Chief Complaint  Follow-up of the Left Knee     Subjective      Anita Estrada presents to [facility] for a follow-up of left knee. Patient has a history of left knee osteoarthritis. She has been treating this conservatively with periodic injections that do provide her with some relief period she has most pain with increased activity especially ambulating up and down stairs. She has been working on losing weight.    No Known Allergies     Social History     Socioeconomic History    Marital status:    Tobacco Use    Smoking status: Some Days     Current packs/day: 0.50     Average packs/day: 0.5 packs/day for 38.5 years (19.2 ttl pk-yrs)     Types: Cigarettes     Start date: 1974     Last attempt to quit: 2008    Smokeless tobacco: Never   Vaping Use    Vaping status: Former    Substances: Nicotine, Flavoring    Devices: Disposable   Substance and Sexual Activity    Alcohol use: Yes     Comment: social    Drug use: Never    Sexual activity: Yes     Partners: Male     Birth control/protection: Hysterectomy        I reviewed the patient's chief complaint, history of present illness, review of systems, past medical history, surgical history, family history, social history, medications, and allergy list.     Review of Systems     Constitutional: Denies fevers, chills, weight loss  Cardiovascular: Denies chest pain, shortness of breath  Skin: Denies rashes, acute skin changes  Neurologic: Denies headache, loss of consciousness        Vital Signs:   /79   Pulse 72   Ht 162.6 cm (64.02\")   Wt (!) 138 kg (304 lb)   SpO2 94%   BMI 52.16 kg/m²          Physical Exam  General: Alert. No acute distress    Ortho Exam        Left knee: Good strength to hamstrings, quadriceps, dorsiflexors and plantar flexors. Tender joint lines. -3 extension. Flexion 110. Antalgic gait. Patella well tracking. Stable to varus/valgus stress. Stable anterior and posterior drawer. Sensation grossly " intact. Neurovascular intact.  Mild swelling.       Large Joint: L knee  Date/Time: 6/26/2025 9:21 AM  Consent given by: patient  Site marked: site marked  Timeout: Immediately prior to procedure a time out was called to verify the correct patient, procedure, equipment, support staff and site/side marked as required   Supporting Documentation  Indications: pain   Procedure Details  Location: knee - L knee  Preparation: Patient was prepped and draped in the usual sterile fashion  Needle gauge: 21g.  Medications administered: 48 mg hylan 48 MG/6ML  Patient tolerance: patient tolerated the procedure well with no immediate complications      This injection documentation was Scribed for Chad Todd MD by Meagan Fajardo MA.  06/26/25   09:21 EDT      Imaging Results (Most Recent)       Procedure Component Value Units Date/Time    XR Knee 3 View Left - In process [[id number]] Resulted: 06/26/25 0916     Updated: 06/26/25 0918    This result has not been signed. Information might be incomplete.               Result Review :     X-Ray Report:  Study: X-rays ordered, taken in the office, and reviewed today  Indication: left knee pain  View: AP/Lateral and Standing view(s)  Findings: Advanced osteoarthritis of the left knee. No acute fractures or dislocations.  Prior studies available for comparison: no                Assessment and Plan     Diagnoses and all orders for this visit:    1. Left knee pain, unspecified chronicity (Primary)  -     XR Knee 3 View Left  -     Large Joint: L knee    2. Primary osteoarthritis of left knee        Discussed risks and benefits of left knee Synvisc injection, patient states their understanding and wishes to proceed. Patient tolerated this injection well.     Call or return if worsening symptoms.    Follow Up     PRN      Patient was given instructions and counseling regarding her condition or for health maintenance advice. Please see specific information pulled into the AVS if  appropriate.     Scribed for Chad Todd MD by Marily Ricci   06/26/25   09:21 EDT    I have personally performed the services described in this document as scribed by the above individual and it is both accurate and complete. Chad Todd MD 06/26/25

## 2025-06-30 LAB
QT INTERVAL: 394 MS
QTC INTERVAL: 445 MS

## 2025-07-28 ENCOUNTER — HOSPITAL ENCOUNTER (OUTPATIENT)
Dept: MAMMOGRAPHY | Facility: HOSPITAL | Age: 65
Discharge: HOME | End: 2025-07-28
Payer: MEDICARE

## 2025-07-28 DIAGNOSIS — Z12.31 SCREENING MAMMOGRAM, ENCOUNTER FOR: ICD-10-CM

## 2025-07-28 PROCEDURE — 77063 BREAST TOMOSYNTHESIS BI: CPT

## 2025-07-28 PROCEDURE — 77067 SCR MAMMO BI INCL CAD: CPT

## 2025-07-31 DIAGNOSIS — Z12.31 ENCOUNTER FOR SCREENING MAMMOGRAM FOR MALIGNANT NEOPLASM OF BREAST: Primary | ICD-10-CM

## 2025-08-06 ENCOUNTER — OFFICE VISIT (OUTPATIENT)
Dept: FAMILY MEDICINE CLINIC | Facility: CLINIC | Age: 65
End: 2025-08-06
Payer: MEDICARE

## 2025-08-06 ENCOUNTER — HOSPITAL ENCOUNTER (OUTPATIENT)
Dept: GENERAL RADIOLOGY | Facility: HOSPITAL | Age: 65
Discharge: HOME | End: 2025-08-06
Payer: MEDICARE

## 2025-08-06 VITALS
HEART RATE: 80 BPM | HEIGHT: 64 IN | SYSTOLIC BLOOD PRESSURE: 138 MMHG | OXYGEN SATURATION: 99 % | BODY MASS INDEX: 50.02 KG/M2 | WEIGHT: 293 LBS | DIASTOLIC BLOOD PRESSURE: 88 MMHG | TEMPERATURE: 98.2 F

## 2025-08-06 DIAGNOSIS — N76.0 ACUTE VAGINITIS: ICD-10-CM

## 2025-08-06 DIAGNOSIS — R10.30 LOWER ABDOMINAL PAIN: ICD-10-CM

## 2025-08-06 DIAGNOSIS — L20.84 INTRINSIC ECZEMA: ICD-10-CM

## 2025-08-06 DIAGNOSIS — M25.551 RIGHT HIP PAIN: ICD-10-CM

## 2025-08-06 DIAGNOSIS — M25.551 RIGHT HIP PAIN: Primary | ICD-10-CM

## 2025-08-06 LAB
BILIRUB BLD-MCNC: NEGATIVE MG/DL
CLARITY, POC: CLEAR
COLOR UR: YELLOW
EXPIRATION DATE: ABNORMAL
GLUCOSE UR STRIP-MCNC: NEGATIVE MG/DL
KETONES UR QL: NEGATIVE
LEUKOCYTE EST, POC: NEGATIVE
Lab: ABNORMAL
NITRITE UR-MCNC: NEGATIVE MG/ML
PH UR: 6 [PH] (ref 5–8)
PROT UR STRIP-MCNC: NEGATIVE MG/DL
RBC # UR STRIP: ABNORMAL /UL
SP GR UR: 1.01 (ref 1–1.03)
UROBILINOGEN UR QL: NORMAL

## 2025-08-06 PROCEDURE — 73502 X-RAY EXAM HIP UNI 2-3 VIEWS: CPT

## 2025-08-13 ENCOUNTER — OFFICE VISIT (OUTPATIENT)
Dept: PODIATRY | Facility: CLINIC | Age: 65
End: 2025-08-13
Payer: MEDICARE

## 2025-08-13 VITALS
WEIGHT: 293 LBS | BODY MASS INDEX: 52.84 KG/M2 | SYSTOLIC BLOOD PRESSURE: 142 MMHG | HEART RATE: 77 BPM | TEMPERATURE: 98 F | OXYGEN SATURATION: 95 % | DIASTOLIC BLOOD PRESSURE: 83 MMHG

## 2025-08-13 DIAGNOSIS — M79.671 FOOT PAIN, BILATERAL: ICD-10-CM

## 2025-08-13 DIAGNOSIS — M79.672 FOOT PAIN, BILATERAL: ICD-10-CM

## 2025-08-13 DIAGNOSIS — B35.1 ONYCHOMYCOSIS: ICD-10-CM

## 2025-08-13 DIAGNOSIS — L60.0 ONYCHOCRYPTOSIS: Primary | ICD-10-CM

## 2025-08-13 DIAGNOSIS — B35.3 TINEA PEDIS OF BOTH FEET: ICD-10-CM

## 2025-08-13 RX ORDER — CLOTRIMAZOLE AND BETAMETHASONE DIPROPIONATE 10; .64 MG/G; MG/G
1 CREAM TOPICAL 2 TIMES DAILY
Qty: 45 G | Refills: 5 | Status: SHIPPED | OUTPATIENT
Start: 2025-08-13

## 2025-08-16 ENCOUNTER — APPOINTMENT (OUTPATIENT)
Facility: HOSPITAL | Age: 65
End: 2025-08-16
Payer: MEDICARE

## 2025-08-16 ENCOUNTER — HOSPITAL ENCOUNTER (EMERGENCY)
Facility: HOSPITAL | Age: 65
Discharge: HOME | End: 2025-08-16
Attending: EMERGENCY MEDICINE
Payer: MEDICARE

## 2025-08-16 VITALS
HEART RATE: 71 BPM | TEMPERATURE: 98.6 F | BODY MASS INDEX: 50.02 KG/M2 | RESPIRATION RATE: 16 BRPM | WEIGHT: 293 LBS | SYSTOLIC BLOOD PRESSURE: 139 MMHG | DIASTOLIC BLOOD PRESSURE: 63 MMHG | OXYGEN SATURATION: 97 % | HEIGHT: 64 IN

## 2025-08-16 DIAGNOSIS — M79.604 LEG PAIN, POSTERIOR, RIGHT: Primary | ICD-10-CM

## 2025-08-16 LAB
BH CV LOWER VASCULAR LEFT COMMON FEMORAL AUGMENT: NORMAL
BH CV LOWER VASCULAR LEFT COMMON FEMORAL COMPETENT: NORMAL
BH CV LOWER VASCULAR LEFT COMMON FEMORAL COMPRESS: NORMAL
BH CV LOWER VASCULAR LEFT COMMON FEMORAL PHASIC: NORMAL
BH CV LOWER VASCULAR LEFT COMMON FEMORAL SPONT: NORMAL
BH CV LOWER VASCULAR RIGHT COMMON FEMORAL AUGMENT: NORMAL
BH CV LOWER VASCULAR RIGHT COMMON FEMORAL COMPETENT: NORMAL
BH CV LOWER VASCULAR RIGHT COMMON FEMORAL COMPRESS: NORMAL
BH CV LOWER VASCULAR RIGHT COMMON FEMORAL PHASIC: NORMAL
BH CV LOWER VASCULAR RIGHT COMMON FEMORAL SPONT: NORMAL
BH CV LOWER VASCULAR RIGHT DISTAL FEMORAL COMPRESS: NORMAL
BH CV LOWER VASCULAR RIGHT GASTRONEMIUS COMPRESS: NORMAL
BH CV LOWER VASCULAR RIGHT GREATER SAPH AK COMPRESS: NORMAL
BH CV LOWER VASCULAR RIGHT GREATER SAPH BK COMPRESS: NORMAL
BH CV LOWER VASCULAR RIGHT LESSER SAPH COMPRESS: NORMAL
BH CV LOWER VASCULAR RIGHT MID FEMORAL AUGMENT: NORMAL
BH CV LOWER VASCULAR RIGHT MID FEMORAL COMPETENT: NORMAL
BH CV LOWER VASCULAR RIGHT MID FEMORAL COMPRESS: NORMAL
BH CV LOWER VASCULAR RIGHT MID FEMORAL PHASIC: NORMAL
BH CV LOWER VASCULAR RIGHT MID FEMORAL SPONT: NORMAL
BH CV LOWER VASCULAR RIGHT PERONEAL COMPRESS: NORMAL
BH CV LOWER VASCULAR RIGHT POPLITEAL AUGMENT: NORMAL
BH CV LOWER VASCULAR RIGHT POPLITEAL COMPETENT: NORMAL
BH CV LOWER VASCULAR RIGHT POPLITEAL COMPRESS: NORMAL
BH CV LOWER VASCULAR RIGHT POPLITEAL PHASIC: NORMAL
BH CV LOWER VASCULAR RIGHT POPLITEAL SPONT: NORMAL
BH CV LOWER VASCULAR RIGHT POSTERIOR TIBIAL COMPRESS: NORMAL
BH CV LOWER VASCULAR RIGHT PROXIMAL FEMORAL COMPRESS: NORMAL
BH CV LOWER VASCULAR RIGHT SAPHENOFEMORAL JUNCTION COMPRESS: NORMAL
BH CV VAS PRELIMINARY FINDINGS SCRIPTING: 1

## 2025-08-16 PROCEDURE — 96372 THER/PROPH/DIAG INJ SC/IM: CPT

## 2025-08-16 PROCEDURE — 93971 EXTREMITY STUDY: CPT | Performed by: SURGERY

## 2025-08-16 PROCEDURE — 25010000002 KETOROLAC TROMETHAMINE PER 15 MG: Performed by: NURSE PRACTITIONER

## 2025-08-16 PROCEDURE — 99284 EMERGENCY DEPT VISIT MOD MDM: CPT

## 2025-08-16 PROCEDURE — 93971 EXTREMITY STUDY: CPT

## 2025-08-16 RX ORDER — KETOROLAC TROMETHAMINE 30 MG/ML
60 INJECTION, SOLUTION INTRAMUSCULAR; INTRAVENOUS ONCE
Status: COMPLETED | OUTPATIENT
Start: 2025-08-16 | End: 2025-08-16

## 2025-08-16 RX ORDER — KETOROLAC TROMETHAMINE 10 MG/1
10 TABLET, FILM COATED ORAL EVERY 6 HOURS PRN
Qty: 20 TABLET | Refills: 0 | Status: SHIPPED | OUTPATIENT
Start: 2025-08-16 | End: 2025-08-21

## 2025-08-16 RX ADMIN — KETOROLAC TROMETHAMINE 60 MG: 30 INJECTION, SOLUTION INTRAMUSCULAR at 18:55

## 2025-08-18 DIAGNOSIS — N76.0 ACUTE VAGINITIS: Primary | ICD-10-CM

## (undated) DEVICE — KT ORCA ORCAPOD DISP STRL

## (undated) DEVICE — LN SMPL CO2 SHTRM SD STREAM W/M LUER

## (undated) DEVICE — SENSR O2 OXIMAX FNGR A/ 18IN NONSTR

## (undated) DEVICE — TUBING, SUCTION, 1/4" X 10', STRAIGHT: Brand: MEDLINE

## (undated) DEVICE — BLCK/BITE BLOX WO/DENTL/RIM W/STRAP 54F

## (undated) DEVICE — FRCP BX RADJAW4 NDL 2.8 240CM LG OG BX40

## (undated) DEVICE — BLCK/BITE BLOX W/DENTL/RIM W/STRAP 54F

## (undated) DEVICE — MSK PROC CURAPLEX O2 2/ADAPT 7FT

## (undated) DEVICE — ADAPT CLN BIOGUARD AIR/H2O DISP